# Patient Record
Sex: MALE | Race: BLACK OR AFRICAN AMERICAN | NOT HISPANIC OR LATINO | Employment: UNEMPLOYED | URBAN - METROPOLITAN AREA
[De-identification: names, ages, dates, MRNs, and addresses within clinical notes are randomized per-mention and may not be internally consistent; named-entity substitution may affect disease eponyms.]

---

## 2017-02-21 ENCOUNTER — TRANSFERRED RECORDS (OUTPATIENT)
Dept: HEALTH INFORMATION MANAGEMENT | Facility: CLINIC | Age: 52
End: 2017-02-21

## 2017-03-21 ENCOUNTER — TELEPHONE (OUTPATIENT)
Dept: GASTROENTEROLOGY | Facility: CLINIC | Age: 52
End: 2017-03-21

## 2017-03-21 NOTE — TELEPHONE ENCOUNTER
Sulfasalazine  Last Written Prescription Date:  8/2/16  Last Fill Quantity: 360,   # refills: 0  Last Office Visit : 6/29/15  (procedure 11/6/15)  Future Office visit:  No appts scheduled    Routing refill request to provider for review/approval because:  Routed to RN

## 2017-03-27 ENCOUNTER — TELEPHONE (OUTPATIENT)
Dept: GASTROENTEROLOGY | Facility: CLINIC | Age: 52
End: 2017-03-27

## 2017-03-27 NOTE — TELEPHONE ENCOUNTER
Sulfasalazine  Last Written Prescription Date: 8/2/16  Last Fill Quantity: 360, # refills: 0  Last Office Visit : 6/29/15 (procedure 11/6/15)  Future Office visit: No appts scheduled     Routing refill request to provider for review/approval because:  Routed to RN pt last appt 6/2015 with no further appts.

## 2017-03-31 ENCOUNTER — CARE COORDINATION (OUTPATIENT)
Dept: GASTROENTEROLOGY | Facility: CLINIC | Age: 52
End: 2017-03-31

## 2017-03-31 NOTE — PROGRESS NOTES
"Contacted patient to schedule clinic follow up as medication refill is needed.    Scheduled with Kory next Friday. Patient reports worsening abdominal pain and requests to \"bump up\" his medication dose. Denies blood in stool.    He was also adamant that I follow up with Dr. Grigsby regarding his Colonoscopy results. He has concerns re: location of polyps removed during procedure as a recently published study  indicates a higher likely marrero of developing colon CA.    Will route to MD    "

## 2017-05-02 ENCOUNTER — TRANSFERRED RECORDS (OUTPATIENT)
Dept: HEALTH INFORMATION MANAGEMENT | Facility: CLINIC | Age: 52
End: 2017-05-02

## 2017-06-29 ENCOUNTER — TELEPHONE (OUTPATIENT)
Dept: INTERNAL MEDICINE | Facility: CLINIC | Age: 52
End: 2017-06-29

## 2017-06-29 NOTE — TELEPHONE ENCOUNTER
----- Message from Mary Erickson sent at 6/29/2017  8:48 AM CDT -----  Regarding: Needs Urgent Appointment  Contact: [phone]  Parrish states that he has not had his colitis medication since March; and the Pain clinic he visits in [place] closed.  He has not had his percocet since June 1st.  Parrish is in a lot of pain and states that he is coming down to the clinic tomorrow regardless of having a scheduled appointment time.  Please give Parrish a call to discuss scheduling options for Friday: [phone]    June 29, 2017 11:37 AM  Spoke with patient. His daughter was having surgery, so he did not have much time to talk, but did again mention that he has been without his medication for colitis since March. Has problems with severe abdominal cramping, with abdominal pain being rated 7-9 of 10. He also has chronic pain issues. His pain clinic in [place] closed, and the clinic that they referred him to isn't accepting his insurance at this time. He has now been without his pain medication since the beginning of June. He wants to be seen at [facility] tomorrow anytime after 10:00 AM. He is aware that Dr. Dial is leaving the [facility], and is willing to see any provider. Appointment scheduled for patient to see Maria Isabel Perez NP at 3:00 tomorrow.   Sunshine Paz RN, Care Coordinator

## 2017-06-30 ENCOUNTER — OFFICE VISIT (OUTPATIENT)
Dept: INTERNAL MEDICINE | Facility: CLINIC | Age: 52
End: 2017-06-30

## 2017-06-30 VITALS — SYSTOLIC BLOOD PRESSURE: 151 MMHG | HEART RATE: 70 BPM | DIASTOLIC BLOOD PRESSURE: 101 MMHG | RESPIRATION RATE: 20 BRPM

## 2017-06-30 DIAGNOSIS — M25.562 CHRONIC PAIN OF LEFT KNEE: ICD-10-CM

## 2017-06-30 DIAGNOSIS — M54.5 CHRONIC LOW BACK PAIN, UNSPECIFIED BACK PAIN LATERALITY, WITH SCIATICA PRESENCE UNSPECIFIED: Primary | ICD-10-CM

## 2017-06-30 DIAGNOSIS — G89.29 CHRONIC NECK PAIN: ICD-10-CM

## 2017-06-30 DIAGNOSIS — G89.29 CHRONIC PAIN OF LEFT KNEE: ICD-10-CM

## 2017-06-30 DIAGNOSIS — M54.2 CHRONIC NECK PAIN: ICD-10-CM

## 2017-06-30 DIAGNOSIS — K51.00 ULCERATIVE PANCOLITIS WITHOUT COMPLICATION (H): ICD-10-CM

## 2017-06-30 DIAGNOSIS — F32.1 MAJOR DEPRESSIVE DISORDER, SINGLE EPISODE, MODERATE (H): ICD-10-CM

## 2017-06-30 DIAGNOSIS — G89.29 CHRONIC LOW BACK PAIN, UNSPECIFIED BACK PAIN LATERALITY, WITH SCIATICA PRESENCE UNSPECIFIED: Primary | ICD-10-CM

## 2017-06-30 DIAGNOSIS — I10 ESSENTIAL HYPERTENSION: ICD-10-CM

## 2017-06-30 LAB
ALBUMIN SERPL-MCNC: 4.2 G/DL (ref 3.4–5)
ALP SERPL-CCNC: 67 U/L (ref 40–150)
ALT SERPL W P-5'-P-CCNC: 40 U/L (ref 0–70)
ANION GAP SERPL CALCULATED.3IONS-SCNC: 6 MMOL/L (ref 3–14)
AST SERPL W P-5'-P-CCNC: 24 U/L (ref 0–45)
BILIRUB SERPL-MCNC: 0.7 MG/DL (ref 0.2–1.3)
BUN SERPL-MCNC: 13 MG/DL (ref 7–30)
CALCIUM SERPL-MCNC: 9 MG/DL (ref 8.5–10.1)
CHLORIDE SERPL-SCNC: 103 MMOL/L (ref 94–109)
CO2 SERPL-SCNC: 30 MMOL/L (ref 20–32)
CREAT SERPL-MCNC: 0.96 MG/DL (ref 0.66–1.25)
ERYTHROCYTE [DISTWIDTH] IN BLOOD BY AUTOMATED COUNT: 11.9 % (ref 10–15)
GFR SERPL CREATININE-BSD FRML MDRD: 82 ML/MIN/1.7M2
GLUCOSE SERPL-MCNC: 118 MG/DL (ref 70–99)
HCT VFR BLD AUTO: 42.5 % (ref 40–53)
HGB BLD-MCNC: 14.7 G/DL (ref 13.3–17.7)
MCH RBC QN AUTO: 30.3 PG (ref 26.5–33)
MCHC RBC AUTO-ENTMCNC: 34.6 G/DL (ref 31.5–36.5)
MCV RBC AUTO: 88 FL (ref 78–100)
PLATELET # BLD AUTO: 232 10E9/L (ref 150–450)
POTASSIUM SERPL-SCNC: 3.6 MMOL/L (ref 3.4–5.3)
PROT SERPL-MCNC: 7.4 G/DL (ref 6.8–8.8)
RBC # BLD AUTO: 4.85 10E12/L (ref 4.4–5.9)
SODIUM SERPL-SCNC: 138 MMOL/L (ref 133–144)
WBC # BLD AUTO: 5.3 10E9/L (ref 4–11)

## 2017-06-30 RX ORDER — ESCITALOPRAM OXALATE 20 MG/1
20 TABLET ORAL DAILY
Qty: 90 TABLET | Refills: 1 | Status: ON HOLD | OUTPATIENT
Start: 2017-06-30 | End: 2019-03-11

## 2017-06-30 RX ORDER — BACLOFEN 10 MG/1
10 TABLET ORAL 2 TIMES DAILY
Qty: 90 TABLET | Refills: 1 | Status: SHIPPED | OUTPATIENT
Start: 2017-06-30

## 2017-06-30 RX ORDER — SULFASALAZINE 500 MG/1
1000 TABLET ORAL 2 TIMES DAILY
Qty: 180 TABLET | Refills: 0 | Status: SHIPPED | OUTPATIENT
Start: 2017-06-30 | End: 2021-02-01 | Stop reason: ALTCHOICE

## 2017-06-30 RX ORDER — GABAPENTIN 300 MG/1
300 CAPSULE ORAL AT BEDTIME
Qty: 30 CAPSULE | Refills: 1 | Status: SHIPPED | OUTPATIENT
Start: 2017-06-30

## 2017-06-30 ASSESSMENT — ANXIETY QUESTIONNAIRES
1. FEELING NERVOUS, ANXIOUS, OR ON EDGE: MORE THAN HALF THE DAYS
IF YOU CHECKED OFF ANY PROBLEMS ON THIS QUESTIONNAIRE, HOW DIFFICULT HAVE THESE PROBLEMS MADE IT FOR YOU TO DO YOUR WORK, TAKE CARE OF THINGS AT HOME, OR GET ALONG WITH OTHER PEOPLE: VERY DIFFICULT
5. BEING SO RESTLESS THAT IT IS HARD TO SIT STILL: NOT AT ALL
6. BECOMING EASILY ANNOYED OR IRRITABLE: MORE THAN HALF THE DAYS
3. WORRYING TOO MUCH ABOUT DIFFERENT THINGS: SEVERAL DAYS
7. FEELING AFRAID AS IF SOMETHING AWFUL MIGHT HAPPEN: SEVERAL DAYS
GAD7 TOTAL SCORE: 10
2. NOT BEING ABLE TO STOP OR CONTROL WORRYING: MORE THAN HALF THE DAYS

## 2017-06-30 ASSESSMENT — PAIN SCALES - GENERAL: PAINLEVEL: EXTREME PAIN (8)

## 2017-06-30 ASSESSMENT — PATIENT HEALTH QUESTIONNAIRE - PHQ9: 5. POOR APPETITE OR OVEREATING: MORE THAN HALF THE DAYS
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
## 2017-06-30 NOTE — PATIENT INSTRUCTIONS
[facility] Phone Number [phone]  [facility] Medication Refill Request Information:  * Please contact your pharmacy regarding ANY request for medication refills.  ** [facility] Prescription Fax = [phone]  * Please allow 3 business days for routine medication refills.  * Please allow 5 business days for controlled substance medication refills.     [facility] Test Result notification information:  *You will be notified with in 7-10 days of your appointment day regarding the results of your test.  If you are on MyChart you will be notified as soon as the provider has reviewed the results and signed off on them.

## 2017-07-01 ASSESSMENT — ANXIETY QUESTIONNAIRES: GAD7 TOTAL SCORE: 10

## 2017-07-01 ASSESSMENT — PATIENT HEALTH QUESTIONNAIRE - PHQ9: SUM OF ALL RESPONSES TO PHQ QUESTIONS 1-9: 11

## 2017-07-14 ENCOUNTER — OFFICE VISIT (OUTPATIENT)
Dept: INTERNAL MEDICINE | Facility: CLINIC | Age: 52
End: 2017-07-14

## 2017-07-14 VITALS
SYSTOLIC BLOOD PRESSURE: 130 MMHG | DIASTOLIC BLOOD PRESSURE: 78 MMHG | BODY MASS INDEX: 31.49 KG/M2 | HEART RATE: 70 BPM | WEIGHT: 209 LBS

## 2017-07-14 DIAGNOSIS — F11.90 CHRONIC NARCOTIC USE: ICD-10-CM

## 2017-07-14 DIAGNOSIS — M54.5 CHRONIC LOW BACK PAIN, UNSPECIFIED BACK PAIN LATERALITY, WITH SCIATICA PRESENCE UNSPECIFIED: Primary | ICD-10-CM

## 2017-07-14 DIAGNOSIS — M54.2 CHRONIC NECK PAIN: ICD-10-CM

## 2017-07-14 DIAGNOSIS — G89.29 CHRONIC LOW BACK PAIN, UNSPECIFIED BACK PAIN LATERALITY, WITH SCIATICA PRESENCE UNSPECIFIED: Primary | ICD-10-CM

## 2017-07-14 DIAGNOSIS — G89.29 CHRONIC NECK PAIN: ICD-10-CM

## 2017-07-14 DIAGNOSIS — Z02.89 PAIN MANAGEMENT CONTRACT SIGNED: ICD-10-CM

## 2017-07-14 PROCEDURE — 40000358 ZZHCL STATISTIC DRUG SCREEN MULTIPLE (METRO): Performed by: INTERNAL MEDICINE

## 2017-07-14 PROCEDURE — 80307 DRUG TEST PRSMV CHEM ANLYZR: CPT | Performed by: INTERNAL MEDICINE

## 2017-07-14 RX ORDER — OXYCODONE AND ACETAMINOPHEN 5; 325 MG/1; MG/1
1 TABLET ORAL EVERY 6 HOURS PRN
Qty: 120 TABLET | Refills: 0 | Status: SHIPPED | OUTPATIENT
Start: 2017-07-14 | End: 2017-08-14

## 2017-07-14 ASSESSMENT — PAIN SCALES - GENERAL: PAINLEVEL: EXTREME PAIN (9)
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
## 2017-07-14 NOTE — LETTER
[facility] ([facility]) Contract: Opioid Prescription  I understand that my provider, ____________________________, is prescribing an opioid medication to assist me in managing my chronic pain and assist me in improving my daily function and activity level. If my activity level or general function changes, the medication may be changed or discontinued. I understand that my provider is not required to prescribe this medication. The risks, side effects, and benefits of taking this medication have been explained to me and I agree to the following conditions related to this treatment. Failure to adhere to these conditions will result in discontinuation of this medication and possible termination of care from the [facility].     I will take my medication as prescribed. I will not change the amount or frequency of the medication without provider approval.    I will only receive these medications from _____________________________________________ (or my provider s designee as determined by my provider).    I am being prescribed opioid medications to treat the following condition(s):________________________________________________.    If I am hospitalized or seen in an emergency department or urgent care for a condition that results in a prescription for another controlled substance (such as anti-anxiety, additional pain medication, sleep aid, or stimulants), I will inform the clinic within one business day of the additional medication.    I will notify the clinic within one business day if I seek any care elsewhere related to this medication.    I will participate in all additional treatments that my provider recommends, such as physical therapy or consultations with a pain or mental health specialist.     I will notify my provider of any history of addiction or current chemical dependence.    I will not use illegal drugs (includes marijuana).     I will comply with random drug screening to confirm  proper use of my medication.    I will comply with state law. I understand that I may not be able to operate a motor vehicle while taking these medications. I will not participate in any activities that will put myself or others at physical risk while taking any medications.    I will not give, sell, or trade my medications to anyone else.      I will not take someone else s medications.    I will not forge or change my prescriptions in any way.    I understand I will have one clinic appointment every __________months (or more frequently) as determined by my provider.    I understand it is my responsibility to schedule future appointments with my provider at the end of each visit.    I will not request early refills. I understand that lost and/or stolen prescriptions/medications will not be replaced.    I will request refills of these medications in the following manner:    o I am responsible to keep track of my medications and plan ahead to arrange for refills. It is my responsibility to ensure that I do not run out of medication.  o I will call [phone] and request a refill of my opioid medication(s).  All other medication refill request should be requested through your pharmacy.  o I will give the clinic one full week notice prior to needing a refill.  o I will not walk in or call into the clinic and request this medication to be refilled same day (I understand I must give a full week notice of the need for refill).  o I understand that these prescriptions will be dispensed monthly with a maximum of one-month supply with no refills.  o Prescriptions will only be available to be picked up during regular office hours (7:30am-5:00pm Monday-Friday). Refills will not be provided at night, on weekends, or during holidays.  I will treat my provider and clinic staff with respect. I will not yell, name-call, shout, or use offensive or vulgar language. I will not threaten or act in an intimidating manner on the  telephone or while in the clinic toward my provider or clinic staff.  I agree if I break this agreement, my physician reserves the right to stop prescribing the controlled substance for me - my medications will be discontinued in a medically safe fashion, and I will not be allowed to get pain medications from any other provider in this clinic and/or it may result in a termination of care from this clinic.       I understand this contract is in effect for all current and future opioid prescriptions received through the [facility] Care [facility].          _________________________________________________________________________                _________________  Signature of Patient or Personal Representative (state relationship)                                        Date    _________________________________________________________________________                 ________________                                              Signature of Provider (also please print name)                                                                               Date

## 2017-07-14 NOTE — MR AVS SNAPSHOT
After Visit Summary   7/14/2017    Parrish Muir    MRN: [id number]           Patient Information     Date Of Birth          1965        Visit Information        Provider Department      7/14/2017 2:30 PM Barbara Nye MD [facility]        Today's Diagnoses     Chronic low back pain, unspecified back pain laterality, with sciatica presence unspecified    -  1    Chronic neck pain        Chronic narcotic use        Pain management contract signed          Care Instructions    Primary Care Center Medication Refill Request Information:  * Please contact your pharmacy regarding ANY request for medication refills.  ** [facility] Prescription Fax = [phone]  * Please allow 3 business days for routine medication refills.  * Please allow 5 business days for controlled substance medication refills.     Primary Care Center Test Result notification information:  *You will be notified within 7-10 days of your appointment day regarding the results of your test.  If you are on MyChart you will be notified as soon as the provider has reviewed the results and signed off on them.            Follow-ups after your visit        Follow-up notes from your care team     Return in about 2 months (around 9/14/2017).      Your next 10 appointments already scheduled     Aug 23, 2017  8:00 AM CDT   (Arrive by 7:45 AM)   Return Visit with Kory Reese PA-C   [facility] ([facility])    [address] [place] [id number]   [phone]            Sep 13, 2017 11:25 AM CDT   (Arrive by 11:10 AM)   Return Visit with Barbara Nye MD   [facility] ([facility])    [address] [place] [id number]   [phone]            Oct 31, 2017 12:30 PM CDT   (Arrive by 12:15 PM)   New Patient Visit with Damien Fay MD   [facility] ([facility])    [address]  [address] [facility] [id number] [phone]              Who to contact     Please call your clinic at [phone] to:    Ask questions about your health    Make or cancel appointments    Discuss your medicines    Learn about your test results    Speak to your doctor   If you have compliments or concerns about an experience at your clinic, or if you wish to file a complaint, please contact [facility] Patient Relations at [phone] or email us at [email or web address]         Additional Information About Your Visit        [employer] Information     [employer] gives you secure access to your electronic health record. If you see a primary care provider, you can also send messages to your care team and make appointments. If you have questions, please call your primary care clinic.  If you do not have a primary care provider, please call [phone] and they will assist you.      [employer] is an electronic gateway that provides easy, online access to your medical records. With [employer], you can request a clinic appointment, read your test results, renew a prescription or communicate with your care team.     To access your existing account, please contact your [facility] or call [phone] for assistance.        Care EveryWhere ID     This is your Care EveryWhere ID. This could be used by other organizations to access your [place] medical records  [id number]        Your Vitals Were     Pulse BMI (Body Mass Index)                70 31.49 kg/m2           Blood Pressure from Last 3 Encounters:   07/14/17 130/78   06/30/17 (!) 151/101   01/29/16 (!) 135/94    Weight from Last 3 Encounters:   07/14/17 94.8 kg (209 lb)   01/29/16 96.1 kg (211 lb 12.8 oz)   11/06/15 95 kg (209 lb 7 oz)                 Today's Medication Changes          These changes are accurate as of: 7/14/17  11:59 PM.  If you have any questions, ask your nurse or doctor.               These medicines have changed or have updated prescriptions.        Dose/Directions    * oxyCODONE-acetaminophen 5-325 MG per tablet   Commonly known as:  PERCOCET   This may have changed:  You were already taking a medication with the same name, and this prescription was added. Make sure you understand how and when to take each.   Used for:  Chronic low back pain, unspecified back pain laterality, with sciatica presence unspecified, Chronic neck pain, Chronic narcotic use   Changed by:  Barbara Nye MD        Dose:  1 tablet   Take 1 tablet by mouth every 6 hours as needed for moderate to severe pain   Quantity:  120 tablet   Refills:  0       * oxyCODONE-acetaminophen 5-325 MG per tablet   Commonly known as:  PERCOCET   This may have changed:  Another medication with the same name was added. Make sure you understand how and when to take each.   Changed by:  Ivan Wallace MD        Dose:  1 tablet   Take 1 tablet by mouth every 4 hours as needed.   Refills:  0       * Notice:  This list has 2 medication(s) that are the same as other medications prescribed for you. Read the directions carefully, and ask your doctor or other care provider to review them with you.         Where to get your medicines      Some of these will need a paper prescription and others can be bought over the counter.  Ask your nurse if you have questions.     Bring a paper prescription for each of these medications     oxyCODONE-acetaminophen 5-325 MG per tablet                Primary Care Provider Office Phone # Fax #    Farida Dial [phone]       PRIMARY CARE CENTER [address] [place] [address]        Equal Access to Services     [facility]ET[facility] : Bennett ruggiero Soradha, wawallace luqdarius, qanancita kategan bentley . [facility] [phone].    ATENCIÓN: Mahesh abernathy  "español, tiene a boyd disposición servicios gratuitos de asistencia lingüística. Herbert lay [phone].    We comply with applicable federal civil rights laws and [place] laws. We do not discriminate on the basis of race, color, national origin, age, disability sex, sexual orientation or gender identity.            Thank you!     Thank you for choosing [facility]  for your care. Our goal is always to provide you with excellent care. Hearing back from our patients is one way we can continue to improve our services. Please take a few minutes to complete the written survey that you may receive in the mail after your visit with us. Thank you!             Your Updated Medication List - Protect others around you: Learn how to safely use, store and throw away your medicines at www.disposemymeds.org.          This list is accurate as of: 7/14/17 11:59 PM.  Always use your most recent med list.                   Brand Name Dispense Instructions for use Diagnosis    baclofen 10 MG tablet    LIORESAL    90 tablet    Take 1 tablet (10 mg) by mouth 2 times daily    Chronic low back pain, unspecified back pain laterality, with sciatica presence unspecified       escitalopram 20 MG tablet    LEXAPRO    90 tablet    Take 1 tablet (20 mg) by mouth daily    Major depressive disorder, single episode, moderate (H)       FOLIC ACID PO      Take 400 mcg by mouth daily        gabapentin 300 MG capsule    NEURONTIN    30 capsule    Take 1 capsule (300 mg) by mouth At Bedtime    Chronic low back pain, unspecified back pain laterality, with sciatica presence unspecified       magnesium citrate solution     296 mL    Take 296 mLs by mouth See Admin Instructions Refer to \"Getting Ready for a Colonoscopy\" patient handout.    Inflammatory bowel disease (ulcerative colitis) (H)       * oxyCODONE-acetaminophen 5-325 MG per tablet    PERCOCET    120 tablet    Take 1 tablet by mouth every 6 hours as needed for moderate to severe " "pain    Chronic low back pain, unspecified back pain laterality, with sciatica presence unspecified, Chronic neck pain, Chronic narcotic use       * oxyCODONE-acetaminophen 5-325 MG per tablet    PERCOCET     Take 1 tablet by mouth every 4 hours as needed.        sulfaSALAzine 500 MG tablet    AZULFIDINE    180 tablet    Take 2 tablets (1,000 mg) by mouth 2 times daily    Ulcerative pancolitis without complication (H)       TYLENOL PO      Take 1 tablet by mouth every 4 hours as needed for mild pain or fever '\" I take 2500 to 3500 mg of tylenol daily.\"        * Notice:  This list has 2 medication(s) that are the same as other medications prescribed for you. Read the directions carefully, and ask your doctor or other care provider to review them with you.      "

## 2017-07-14 NOTE — NURSING NOTE
Chief Complaint   Patient presents with     Pain     Pt states he is here for chronic pain.      Kiara Peralta LPN July 14, 2017 2:11 PM

## 2017-07-14 NOTE — PATIENT INSTRUCTIONS
Primary Care Center Medication Refill Request Information:  * Please contact your pharmacy regarding ANY request for medication refills.  ** [facility] Prescription Fax = [phone]  * Please allow 3 business days for routine medication refills.  * Please allow 5 business days for controlled substance medication refills.     Primary Care Center Test Result notification information:  *You will be notified within 7-10 days of your appointment day regarding the results of your test.  If you are on MyChart you will be notified as soon as the provider has reviewed the results and signed off on them.

## 2017-07-17 LAB
ACETAMINOPHEN QUAL: NEGATIVE
AMANTADINE: NEGATIVE
AMITRIPTYLINE QUAL: NEGATIVE
AMOXAPINE: NEGATIVE
AMPHETAMINES QUAL: NEGATIVE
ATROPINE: NEGATIVE
BENZODIAZ UR QL: ABNORMAL
CAFFEINE QUAL: POSITIVE
CANNABINOIDS UR QL SCN: ABNORMAL
CARBAMAZEPINE QUAL: NEGATIVE
CHLORPHENIRAMINE: NEGATIVE
CHLORPROMAZINE: NEGATIVE
CITALOPRAM QUAL: POSITIVE
CLOMIPRAMINE QUAL: NEGATIVE
COCAINE QUAL: NEGATIVE
COCAINE UR QL: ABNORMAL
CODEINE QUAL: NEGATIVE
DESIPRAMINE QUAL: NEGATIVE
DEXTROMETHORPHAN: NEGATIVE
DIPHENHYDRAMINE: NEGATIVE
DOXEPIN/METABOLITE: NEGATIVE
DOXYLAMINE: NEGATIVE
EPHEDRINE OR PSEUDO: NEGATIVE
FENTANYL QUAL: NEGATIVE
FLUOXETINE AND METAB: NEGATIVE
HYDROCODONE QUAL: NEGATIVE
HYDROMORPHONE QUAL: NEGATIVE
IBUPROFEN QUAL: NEGATIVE
IMIPRAMINE QUAL: NEGATIVE
LAMOTRIGINE QUAL: NEGATIVE
LOXAPINE: NEGATIVE
MAPROTYLINE: NEGATIVE
MDMA QUAL: NEGATIVE
MEPERIDINE QUAL: NEGATIVE
METHAMPHETAMINE: NEGATIVE
METHODONE QUAL: NEGATIVE
MORPHINE QUAL: NEGATIVE
NICOTINE: NEGATIVE
NORTRIPTYLINE QUAL: NEGATIVE
OLANZAPINE QUAL: NEGATIVE
OPIATES UR QL SCN: ABNORMAL
OXYCODONE QUAL: NEGATIVE
PENTAZOCINE: NEGATIVE
PHENCYCLIDINE QUAL: NEGATIVE
PHENMETRAZINE: NEGATIVE
PHENTERMINE: NEGATIVE
PHENYLBUTAZONE: NEGATIVE
PHENYLPROPANOLAMINE: NEGATIVE
PROPOXPHENE QUAL: NEGATIVE
PROPRANOLOL QUAL: NEGATIVE
PYRILAMINE: NEGATIVE
SALICYLATE QUAL: NEGATIVE
THEOBROMINE: POSITIVE
TOPIRAMATE QUAL: NEGATIVE
TRIMIPRAMINE QUAL: NEGATIVE
VENLAFAXINE QUAL: ABNORMAL

## 2017-08-14 DIAGNOSIS — M54.2 CHRONIC NECK PAIN: ICD-10-CM

## 2017-08-14 DIAGNOSIS — G89.29 CHRONIC LOW BACK PAIN, UNSPECIFIED BACK PAIN LATERALITY, WITH SCIATICA PRESENCE UNSPECIFIED: ICD-10-CM

## 2017-08-14 DIAGNOSIS — G89.29 CHRONIC NECK PAIN: ICD-10-CM

## 2017-08-14 DIAGNOSIS — F11.90 CHRONIC NARCOTIC USE: ICD-10-CM

## 2017-08-14 DIAGNOSIS — M54.5 CHRONIC LOW BACK PAIN, UNSPECIFIED BACK PAIN LATERALITY, WITH SCIATICA PRESENCE UNSPECIFIED: ICD-10-CM

## 2017-08-14 RX ORDER — OXYCODONE AND ACETAMINOPHEN 5; 325 MG/1; MG/1
1 TABLET ORAL EVERY 6 HOURS PRN
Qty: 120 TABLET | Refills: 0 | Status: SHIPPED | OUTPATIENT
Start: 2017-08-14 | End: 2017-09-13

## 2017-08-14 NOTE — TELEPHONE ENCOUNTER
Date last refill was ordered:7/14/17  Quantity ordered:120  Number of refills:0  Date of last clinic visit:7.14/17  Date of next clinic visit:9.13.17    Processing: Contact patient to confirm     8/14/17 RX mailed to patients home address per patient request.  Raquel Palacios RN

## 2017-08-23 ENCOUNTER — OFFICE VISIT (OUTPATIENT)
Dept: GASTROENTEROLOGY | Facility: CLINIC | Age: 52
End: 2017-08-23

## 2017-08-23 VITALS
OXYGEN SATURATION: 98 % | SYSTOLIC BLOOD PRESSURE: 162 MMHG | WEIGHT: 205 LBS | HEART RATE: 67 BPM | BODY MASS INDEX: 30.36 KG/M2 | DIASTOLIC BLOOD PRESSURE: 108 MMHG | HEIGHT: 69 IN

## 2017-08-23 DIAGNOSIS — Z79.52 LONG TERM CURRENT USE OF SYSTEMIC STEROIDS: Primary | ICD-10-CM

## 2017-08-23 DIAGNOSIS — K51.00 ULCERATIVE PANCOLITIS WITHOUT COMPLICATION (H): ICD-10-CM

## 2017-08-23 RX ORDER — MESALAMINE 1.2 G/1
2400 TABLET, DELAYED RELEASE ORAL EVERY MORNING
Qty: 180 TABLET | Refills: 1 | Status: SHIPPED | OUTPATIENT
Start: 2017-08-23 | End: 2018-02-26

## 2017-08-23 ASSESSMENT — PAIN SCALES - GENERAL: PAINLEVEL: NO PAIN (0)

## 2017-08-23 NOTE — PROGRESS NOTES
IBD CLINIC VISIT     CC/REFERRING MD:  Miguel Morales  REASON FOR FOLLOW UP: Ulcerative Colitis  COLLABORATING PHYSICIAN: Francisco Grigsby MD    IBD HISTORY  Age at diagnosis: 2004  Extent of disease: pan-colitis  Current UC medications: SSA 1000mg PO BID  Prior UC surgeries: None  Prior IBD Medications:   Asacol (when first diagnosed and did not work)  Prednisone  Lialda (worked well)    DRUG MONITORING  TPMT enzyme activity: n/a    6-TGN/6-MMPN levels: n/a    Biologic concentration:n/a    DISEASE ASSESSMENT  Labs:  Lab Results   Component Value Date    ALBUMIN 4.2 06/30/2017     Recent Labs   Lab Test  03/27/15   1059   SED  5     Endoscopic assessment: Normal mucosa throughout colon.  2mm sessile sigmoid polyp resected.   Enterography: --  Fecal calprotectin: --   C diff: --    ASSESSMENT/PLAN  Parrish is a 52-year-old male with ulcerative colitis, in clinical and symptomatic remission on sulfasalazine monotherapy.      1.  Ulcerative colitis, pancolitis.  The patient is interested in changing to Lialda from sulfasalazine.  He has tried Lialda in the past with good success. I have agreed to switch patient to Lialda.  Of note, the patient had been on Asacol in the past without resolution of symptoms, but feels that Lialda has worked well.  The patient will be due for colon cancer surveillance in fall of 2018.  Laboratory studies while on sulfasalazine include a normal creatinine obtained in 06/2017.     2. 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  Dysplasia screening is recommended 2018.    Cervical cancer screening: N/A    Skin cancer screening: Since patient is not immunocompromised, this is per patient's dermatologist recommendations.    Depression Screening:  -- Over the last month, have you felt down, depressed, or hopeless? Yes  -- Over the last month, have you felt little interest or pleasure doing things? Yes    Misc:  -- Avoid tobacco use  -- Avoid NSAIDs as there is potentially a 25% chance of causing an IBD flare    RTC 6 months    Thank you for this consultation.  It was a pleasure to participate in the care of this patient; please contact us with any further questions.      Kory Reese PA-C  Division of Gastroenterology, Hepatology and Nutrition  [facility]    HPI:   A 52-year-old male with history of ulcerative colitis since 2003, currently on sulfasalazine monotherapy with other past medical history to include hypertension, depression.  The patient's current bowel pattern is 1-2 bowel movements per day; however, he does have occasions of extreme abdominal cramping which he describes as a banana shaped pattern on his lower abdomen.  Denies any hematochezia, melena, or nighttime stools.  He does have occasional nausea when the abdominal cramping comes on.  He did have a period of time in the spring where he was off sulfasalazine as he was out of refills; however, he has restarted his sulfasalazine over the last month.  He has been on Asacol in the past which  did not control symptoms.  He was then started on sulfasalazine, which has controlled his disease for the most part, however, he does have intermittent abdominal cramping.  He has tried Lialda in the past and feels that this worked better in controlling symptoms.  Last endoscopic evaluation was in 2015 that showed complete mucosal healing. Patient denies BRBPR, melena, urgency, hesitancy passing flatus, skin changes, vision changes and nightime stools    ROS:    No fevers or chills  No weight loss  No blurry vision, double vision or change in vision  No sore throat  No lymphadenopathy  + headache  No paraesthesias, or weakness in a limb  No shortness of breath or wheezing  No chest pain or pressure  + arthralgias or myalgias  No rashes or skin changes  No odynophagia or dysphagia  No BRBPR, hematochezia, melena  No dysuria, frequency or urgency  No hot/cold intolerance or polyria  + anxiety or depression    Extra intestinal manifestations of IBD:  No uveitis/episcleritis  No aphthous ulcers   No arthritis   No erythema nodosum/pyoderma gangrenosum.     PERTINENT PAST MEDICAL HISTORY:  As noted above    PREVIOUS SURGERIES:  Past Surgical History:   Procedure Laterality Date     COLONOSCOPY WITH CO2 INSUFFLATION N/A 11/6/2015    Procedure: COLONOSCOPY WITH CO2 INSUFFLATION;  Surgeon: Francisco Grigsby MD;  Location:  OR     ALLERGIES:     Allergies   Allergen Reactions     Morphine Sulfate        PERTINENT MEDICATIONS:    Current Outpatient Prescriptions:      oxyCODONE-acetaminophen (PERCOCET) 5-325 MG per tablet, Take 1 tablet by mouth every 6 hours as needed for moderate to severe pain, Disp: 120 tablet, Rfl: 0     escitalopram (LEXAPRO) 20 MG tablet, Take 1 tablet (20 mg) by mouth daily, Disp: 90 tablet, Rfl: 1     baclofen (LIORESAL) 10 MG tablet, Take 1 tablet (10 mg) by mouth 2 times daily, Disp: 90 tablet, Rfl: 1     gabapentin (NEURONTIN) 300 MG capsule, Take 1 capsule (300 mg) by mouth At Bedtime, Disp: 30  "capsule, Rfl: 1     sulfaSALAzine (AZULFIDINE) 500 MG tablet, Take 2 tablets (1,000 mg) by mouth 2 times daily, Disp: 180 tablet, Rfl: 0     FOLIC ACID PO, Take 400 mcg by mouth daily, Disp: , Rfl:      Acetaminophen (TYLENOL PO), Take 1 tablet by mouth every 4 hours as needed for mild pain or fever '\" I take 2500 to 3500 mg of tylenol daily.\", Disp: , Rfl:      magnesium citrate solution, Take 296 mLs by mouth See Admin Instructions Refer to \"Getting Ready for a Colonoscopy\" patient handout., Disp: 296 mL, Rfl: 0     oxycodone-acetaminophen (PERCOCET) 5-325 MG per tablet, Take 1 tablet by mouth every 4 hours as needed., Disp: , Rfl:     SOCIAL HISTORY:  Social History     Social History     Marital status:      Spouse name: N/A     Number of children: N/A     Years of education: N/A     Occupational History     Not on file.     Social History Main Topics     Smoking status: Never Smoker     Smokeless tobacco: Never Used     Alcohol use No     Drug use: No     Sexual activity: Not on file     Other Topics Concern     Not on file     Social History Narrative       FAMILY HISTORY:  Family History   Problem Relation Age of Onset     Alzheimer Disease Mother        Past/family/social history reviewed and no changes    PHYSICAL EXAMINATION:  Constitutional: aaox3, cooperative, pleasant, not dyspneic/diaphoretic, no acute distress  Vitals reviewed: BP (!) 162/108  Pulse 67  Ht 1.753 m (5' 9\")  Wt 93 kg (205 lb)  SpO2 98%  BMI 30.27 kg/m2  Wt:   Wt Readings from Last 2 Encounters:   07/14/17 94.8 kg (209 lb)   01/29/16 96.1 kg (211 lb 12.8 oz)      Eyes: Sclera anicteric/injected  Ears/nose/mouth/throat: Normal oropharynx without ulcers or exudate, mucus membranes moist, hearing intact  Neck: supple, thyroid normal size  CV: No edema  Respiratory: Unlabored breathing  Lymph: No axillary, submandibular, supraclavicular or inguinal lymphadenopathy  Abd: Nondistended, +bs, no hepatosplenomegaly, nontender, no " peritoneal signs  Skin: warm, perfused, no jaundice  Psych: Normal affect  MSK: Normal gait      PERTINENT STUDIES:  Most recent CBC:  Recent Labs   Lab Test  06/30/17   1612  03/27/15   1059   WBC  5.3  6.0   HGB  14.7  14.5   HCT  42.5  42.1   PLT  232  226     Most recent hepatic panel:  Recent Labs   Lab Test  06/30/17   1612  03/27/15   1059   ALT  40  48   AST  24  28     Most recent creatinine:  Recent Labs   Lab Test  06/30/17   1612  03/27/15   1059   CR  0.96  1.01

## 2017-08-23 NOTE — LETTER
8/23/2017       RE: Parrish Muir  [address] [address]     Dear Colleague,    Thank you for referring your patient, Parrish Muir, to the [facility]. Please see a copy of my visit note below.    IBD CLINIC VISIT     CC/REFERRING MD:  Miguel Morales  REASON FOR FOLLOW UP: Ulcerative Colitis  COLLABORATING PHYSICIAN: Francisco Grigsby MD    IBD HISTORY  Age at diagnosis: 2004  Extent of disease: pan-colitis  Current UC medications: SSA 1000mg PO BID  Prior UC surgeries: None  Prior IBD Medications:   Asacol (when first diagnosed and did not work)  Prednisone  Lialda (worked well)    DRUG MONITORING  TPMT enzyme activity: n/a    6-TGN/6-MMPN levels: n/a    Biologic concentration:n/a    DISEASE ASSESSMENT  Labs:  Lab Results   Component Value Date    ALBUMIN 4.2 06/30/2017     Recent Labs   Lab Test  03/27/15   1059   SED  5     Endoscopic assessment: Normal mucosa throughout colon.  2mm sessile sigmoid polyp resected.   Enterography: --  Fecal calprotectin: --   C diff: --    ASSESSMENT/PLAN  Parrish is a 52-year-old male with ulcerative colitis, in clinical and symptomatic remission on sulfasalazine monotherapy.      1.  Ulcerative colitis, pancolitis.  The patient is interested in changing to Lialda from sulfasalazine.  He has tried Lialda in the past with good success. I have agreed to switch patient to Lialda.  Of note, the patient had been on Asacol in the past without resolution of symptoms, but feels that Lialda has worked well.  The patient will be due for colon cancer surveillance in fall of 2018.  Laboratory studies while on sulfasalazine include a normal creatinine obtained in 06/2017.     2. 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  Dysplasia screening is recommended 2018.    Cervical cancer screening: N/A    Skin cancer screening: Since patient is not immunocompromised, this is per patient's dermatologist recommendations.    Depression Screening:  -- Over the last month, have you felt down, depressed, or hopeless? Yes  -- Over the last month, have you felt little interest or pleasure doing things? Yes    Misc:  -- Avoid tobacco use  -- Avoid NSAIDs as there is potentially a 25% chance of causing an IBD flare    RTC 6 months    Thank you for this consultation.  It was a pleasure to participate in the care of this patient; please contact us with any further questions.      Kory Reese PA-C  Division of Gastroenterology, Hepatology and Nutrition  [facility]    HPI:   A 52-year-old male with history of ulcerative colitis since 2003, currently on sulfasalazine monotherapy with other past medical history to include hypertension, depression.  The patient's current bowel pattern is 1-2 bowel movements per day; however, he does have occasions of extreme abdominal cramping which he describes as a banana shaped pattern on his lower abdomen.  Denies any  hematochezia, melena, or nighttime stools.  He does have occasional nausea when the abdominal cramping comes on.  He did have a period of time in the spring where he was off sulfasalazine as he was out of refills; however, he has restarted his sulfasalazine over the last month.  He has been on Asacol in the past which did not control symptoms.  He was then started on sulfasalazine, which has controlled his disease for the most part, however, he does have intermittent abdominal cramping.  He has tried Lialda in the past and feels that this worked better in controlling symptoms.  Last endoscopic evaluation was in 2015 that showed complete mucosal healing. Patient denies BRBPR, melena, urgency, hesitancy passing flatus, skin changes, vision changes and nightime stools    ROS:    No fevers or chills  No weight loss  No blurry vision, double vision or change in vision  No sore throat  No lymphadenopathy  + headache  No paraesthesias, or weakness in a limb  No shortness of breath or wheezing  No chest pain or pressure  + arthralgias or myalgias  No rashes or skin changes  No odynophagia or dysphagia  No BRBPR, hematochezia, melena  No dysuria, frequency or urgency  No hot/cold intolerance or polyria  + anxiety or depression    Extra intestinal manifestations of IBD:  No uveitis/episcleritis  No aphthous ulcers   No arthritis   No erythema nodosum/pyoderma gangrenosum.     PERTINENT PAST MEDICAL HISTORY:  As noted above    PREVIOUS SURGERIES:  Past Surgical History:   Procedure Laterality Date     COLONOSCOPY WITH CO2 INSUFFLATION N/A 11/6/2015    Procedure: COLONOSCOPY WITH CO2 INSUFFLATION;  Surgeon: Francisco Grigsby MD;  Location:  OR     ALLERGIES:  Allergies   Allergen Reactions     Morphine Sulfate        PERTINENT MEDICATIONS:  Current Outpatient Prescriptions:      oxyCODONE-acetaminophen (PERCOCET) 5-325 MG per tablet, Take 1 tablet by mouth every 6 hours as needed for moderate to severe pain, Disp: 120 tablet,  "Rfl: 0     escitalopram (LEXAPRO) 20 MG tablet, Take 1 tablet (20 mg) by mouth daily, Disp: 90 tablet, Rfl: 1     baclofen (LIORESAL) 10 MG tablet, Take 1 tablet (10 mg) by mouth 2 times daily, Disp: 90 tablet, Rfl: 1     gabapentin (NEURONTIN) 300 MG capsule, Take 1 capsule (300 mg) by mouth At Bedtime, Disp: 30 capsule, Rfl: 1     sulfaSALAzine (AZULFIDINE) 500 MG tablet, Take 2 tablets (1,000 mg) by mouth 2 times daily, Disp: 180 tablet, Rfl: 0     FOLIC ACID PO, Take 400 mcg by mouth daily, Disp: , Rfl:      Acetaminophen (TYLENOL PO), Take 1 tablet by mouth every 4 hours as needed for mild pain or fever '\" I take 2500 to 3500 mg of tylenol daily.\", Disp: , Rfl:      magnesium citrate solution, Take 296 mLs by mouth See Admin Instructions Refer to \"Getting Ready for a Colonoscopy\" patient handout., Disp: 296 mL, Rfl: 0     oxycodone-acetaminophen (PERCOCET) 5-325 MG per tablet, Take 1 tablet by mouth every 4 hours as needed., Disp: , Rfl:     SOCIAL HISTORY:  Social History     Social History     Marital status:      Spouse name: N/A     Number of children: N/A     Years of education: N/A     Occupational History     Not on file.     Social History Main Topics     Smoking status: Never Smoker     Smokeless tobacco: Never Used     Alcohol use No     Drug use: No     Sexual activity: Not on file     Other Topics Concern     Not on file     Social History Narrative       FAMILY HISTORY:  Family History   Problem Relation Age of Onset     Alzheimer Disease Mother        Past/family/social history reviewed and no changes    PHYSICAL EXAMINATION:  Constitutional: aaox3, cooperative, pleasant, not dyspneic/diaphoretic, no acute distress  Vitals reviewed: BP (!) 162/108  Pulse 67  Ht 1.753 m (5' 9\")  Wt 93 kg (205 lb)  SpO2 98%  BMI 30.27 kg/m2  Wt:   Wt Readings from Last 2 Encounters:   07/14/17 94.8 kg (209 lb)   01/29/16 96.1 kg (211 lb 12.8 oz)      Eyes: Sclera " anicteric/injected  Ears/nose/mouth/throat: Normal oropharynx without ulcers or exudate, mucus membranes moist, hearing intact  Neck: supple, thyroid normal size  CV: No edema  Respiratory: Unlabored breathing  Lymph: No axillary, submandibular, supraclavicular or inguinal lymphadenopathy  Abd: Nondistended, +bs, no hepatosplenomegaly, nontender, no peritoneal signs  Skin: warm, perfused, no jaundice  Psych: Normal affect  MSK: Normal gait      PERTINENT STUDIES:  Most recent CBC:  Recent Labs   Lab Test  06/30/17   1612  03/27/15   1059   WBC  5.3  6.0   HGB  14.7  14.5   HCT  42.5  42.1   PLT  232  226     Most recent hepatic panel:  Recent Labs   Lab Test  06/30/17   1612  03/27/15   1059   ALT  40  48   AST  24  28     Most recent creatinine:  Recent Labs   Lab Test  06/30/17   1612  03/27/15   1059   CR  0.96  1.01       Again, thank you for allowing me to participate in the care of your patient.      Sincerely,    Kory Reese PA-C

## 2017-08-23 NOTE — MR AVS SNAPSHOT
After Visit Summary   8/23/2017    Parrish Muir    MRN: [id number]           Patient Information     Date Of Birth          1965        Visit Information        Provider Department      8/23/2017 8:00 AM Kory Reese PA-C [facility] Gastroenterology and IBD        Today's Diagnoses     Long term current use of systemic steroids    -  1    Ulcerative pancolitis without complication (H)          Care Instructions    It was a pleasure taking care of you today.  I've included a brief summary of our discussion and care plan from today's visit below.  Please review this information with your primary care provider.  ______________________________________________________________________    My recommendations are summarized as follows:    -- Start Lialda 2.4 g per day (2 tablets daily).  Continue sulfasalazine until you start Lialda.  -- Plan for colonoscopy next fall 2018  -- Due for Tentanus shot and recommend pneumonia vaccine  -- Recommend DEXA scan given prior steroid use  You can schedule your DEXA scan by calling [phone].  The scan is done on the first floor at the [facility].  The appointment is 30 minutes.  It is recommended that you wear light clothing, no zippers and no metal (including underwire of bra)  You are to withhold your calcium supplement for 48 hours prior to the scan.      Return to GI Clinic in 6 months to review your progress.    ______________________________________________________________________    Who do I call with any questions after my visit?  Please be in touch if there are any further questions that arise following today's visit.  There are multiple ways to contact your gastroenterology care team.        During business hours, you may reach a Gastroenterology nurse at [phone].       To schedule or reschedule an appointment, please call [phone].       You can always send a secure message through [employer] technology.  [employer] messages are  answered by your nurse or doctor typically within 24 hours.  Please allow extra time on weekends and holidays.        For urgent/emergent questions after business hours, you may reach the on-call GI Fellow by contacting the [facility]  at [phone].     How will I get the results of any tests ordered?    You will receive all of your results.  If you have signed up for [employer]oxxhart, any tests ordered at your visit will be available to you after your physician reviews them.  Typically this takes 1-2 weeks.  If there are urgent results that require a change in your care plan, your physician or nurse will call you to discuss the next steps.      What is Riboxxhart?  [employer] is a secure way for you to access all of your healthcare records from the [facility].  It is a web based computer program, so you can sign on to it from any location.  It also allows you to send secure messages to your care team.  I recommend signing up for Cloud[employer] access if you have not already done so and are comfortable with using a computer.      How to I schedule a follow-up visit?  If you did not schedule a follow-up visit today, please call [phone] to schedule a follow-up office visit.        Sincerely,    Kory Reese PA-C  [facility]  Division of Gastroenterology                Follow-ups after your visit        Follow-up notes from your care team     Return in about 6 months (around 2/23/2018).      Your next 10 appointments already scheduled     Sep 13, 2017 11:25 AM CDT   (Arrive by 11:10 AM)   Return Visit with Barbara Nye MD   [facility] ([facility])    [address] [place] [id number] [phone]            Sep 28, 2017 12:10 PM CDT   (Arrive by 11:55 AM)   New Patient Visit with Derrick Simpson MD   [facility] ([facility])    [facility]  "[address] [address] [phone]              Future tests that were ordered for you today     Open Future Orders        Priority Expected Expires Ordered    DX Hip/Pelvis/Spine Routine  8/23/2018 8/23/2017            Who to contact     Please call your clinic at [phone] to:    Ask questions about your health    Make or cancel appointments    Discuss your medicines    Learn about your test results    Speak to your doctor   If you have compliments or concerns about an experience at your clinic, or if you wish to file a complaint, please contact [facility] Patient Relations at [phone] or email us at [email or web address]         Additional Information About Your Visit        [employer] Information     [employer] gives you secure access to your electronic health record. If you see a primary care provider, you can also send messages to your care team and make appointments. If you have questions, please call your primary care clinic.  If you do not have a primary care provider, please call [phone] and they will assist you.      [employer] is an electronic gateway that provides easy, online access to your medical records. With A[employer]s, you can request a clinic appointment, read your test results, renew a prescription or communicate with your care team.     To access your existing account, please contact your [facility] or call [phone] for assistance.        Care EveryWhere ID     This is your Care EveryWhere ID. This could be used by other organizations to access your [place] medical records  [id number]        Your Vitals Were     Pulse Height Pulse Oximetry BMI (Body Mass Index)          67 1.753 m (5' 9\") 98% 30.27 kg/m2         Blood Pressure from Last 3 Encounters:   08/23/17 (!) 162/108   07/14/17 130/78   06/30/17 (!) 151/101    Weight from Last 3 Encounters:   08/23/17 93 kg (205 lb)   07/14/17 " 94.8 kg (209 lb)   01/29/16 96.1 kg (211 lb 12.8 oz)                 Today's Medication Changes          These changes are accurate as of: 8/23/17  8:58 AM.  If you have any questions, ask your nurse or doctor.               Start taking these medicines.        Dose/Directions    mesalamine 1.2 G EC tablet   Commonly known as:  LIALDA   Used for:  Ulcerative pancolitis without complication (H)   Started by:  Kory Reese PA-C        Dose:  2400 mg   Take 2 tablets (2,400 mg) by mouth every morning   Quantity:  180 tablet   Refills:  1            Where to get your medicines      These medications were sent to [email or web address] - [place], [place] - [address] [place] [address]  [address] [place] [address]     Phone:  [phone]     mesalamine 1.2 G EC tablet                Primary Care Provider    None Specified       No primary provider on file.        Equal Access to Services     MARY JANE [facility] CenterBETSY : Hadnereida Garcia, ricardo anguiano, shante kaalmajose maria coleman, tegan pérez . So [facility] [phone].    ATENCIÓN: Si habla español, tiene a boyd disposición servicios gratuitos de asistencia lingüística. Llame al [phone].    We comply with applicable federal civil rights laws and [place] laws. We do not discriminate on the basis of race, color, national origin, age, disability sex, sexual orientation or gender identity.            Thank you!     Thank you for choosing [facility] GASTROENTEROLOGY AND IBD  for your care. Our goal is always to provide you with excellent care. Hearing back from our patients is one way we can continue to improve our services. Please take a few minutes to complete the written survey that you may receive in the mail after your visit with us. Thank you!             Your Updated Medication List - Protect others around you: Learn how to safely use, store and throw away your medicines at www.disposemymeds.org.          This list is accurate as of: 8/23/17   "8:58 AM.  Always use your most recent med list.                   Brand Name Dispense Instructions for use Diagnosis    baclofen 10 MG tablet    LIORESAL    90 tablet    Take 1 tablet (10 mg) by mouth 2 times daily    Chronic low back pain, unspecified back pain laterality, with sciatica presence unspecified       escitalopram 20 MG tablet    LEXAPRO    90 tablet    Take 1 tablet (20 mg) by mouth daily    Major depressive disorder, single episode, moderate (H)       FOLIC ACID PO      Take 400 mcg by mouth daily        gabapentin 300 MG capsule    NEURONTIN    30 capsule    Take 1 capsule (300 mg) by mouth At Bedtime    Chronic low back pain, unspecified back pain laterality, with sciatica presence unspecified       mesalamine 1.2 G EC tablet    LIALDA    180 tablet    Take 2 tablets (2,400 mg) by mouth every morning    Ulcerative pancolitis without complication (H)       oxyCODONE-acetaminophen 5-325 MG per tablet    PERCOCET    120 tablet    Take 1 tablet by mouth every 6 hours as needed for moderate to severe pain    Chronic low back pain, unspecified back pain laterality, with sciatica presence unspecified, Chronic neck pain, Chronic narcotic use       sulfaSALAzine 500 MG tablet    AZULFIDINE    180 tablet    Take 2 tablets (1,000 mg) by mouth 2 times daily    Ulcerative pancolitis without complication (H)       TYLENOL PO      Take 1 tablet by mouth every 4 hours as needed for mild pain or fever '\" I take 2500 to 3500 mg of tylenol daily.\"          "

## 2017-08-23 NOTE — PATIENT INSTRUCTIONS
It was a pleasure taking care of you today.  I've included a brief summary of our discussion and care plan from today's visit below.  Please review this information with your primary care provider.  ______________________________________________________________________    My recommendations are summarized as follows:    -- Start Lialda 2.4 g per day (2 tablets daily).  Continue sulfasalazine until you start Lialda.  -- Plan for colonoscopy next fall 2018  -- Due for Tentanus shot and recommend pneumonia vaccine  -- Recommend DEXA scan given prior steroid use  You can schedule your DEXA scan by calling [phone].  The scan is done on the first floor at the [facility].  The appointment is 30 minutes.  It is recommended that you wear light clothing, no zippers and no metal (including underwire of bra)  You are to withhold your calcium supplement for 48 hours prior to the scan.      Return to GI Clinic in 6 months to review your progress.    ______________________________________________________________________    Who do I call with any questions after my visit?  Please be in touch if there are any further questions that arise following today's visit.  There are multiple ways to contact your gastroenterology care team.        During business hours, you may reach a Gastroenterology nurse at [phone].       To schedule or reschedule an appointment, please call [phone].       You can always send a secure message through [employer].  [employer] messages are answered by your nurse or doctor typically within 24 hours.  Please allow extra time on weekends and holidays.        For urgent/emergent questions after business hours, you may reach the on-call GI Fellow by contacting the [facility] at [phone].     How will I get the results of any tests ordered?    You will receive all of your results.  If you have signed up for MyChart, any tests ordered at your visit will be  available to you after your physician reviews them.  Typically this takes 1-2 weeks.  If there are urgent results that require a change in your care plan, your physician or nurse will call you to discuss the next steps.      What is Presentigohart?  [employer] is a secure way for you to access all of your healthcare records from the [facility].  It is a web based computer program, so you can sign on to it from any location.  It also allows you to send secure messages to your care team.  I recommend signing up for [employer] access if you have not already done so and are comfortable with using a computer.      How to I schedule a follow-up visit?  If you did not schedule a follow-up visit today, please call [phone] to schedule a follow-up office visit.        Sincerely,    Kory Reese PA-C  [facility]  Division of Gastroenterology

## 2017-08-23 NOTE — NURSING NOTE
"Chief Complaint   Patient presents with     RECHECK     pathology from colonoscopy . abdominal pain        Vitals:    08/23/17 0813   BP: (!) 162/108   Pulse: 67   SpO2: 98%   Weight: 93 kg (205 lb)   Height: 1.753 m (5' 9\")       Body mass index is 30.27 kg/(m^2).                            "

## 2017-09-11 ENCOUNTER — PRE VISIT (OUTPATIENT)
Dept: ANESTHESIOLOGY | Facility: CLINIC | Age: 52
End: 2017-09-11

## 2017-09-11 NOTE — TELEPHONE ENCOUNTER
1.  Date/reason for appt: 9/28/17 12:10PM Chronic low back pain, unspecified back pain laterality, with sciatica presence unspecified Chronic neck pain Chronic pain of left knee  2.  Referring provider:  JACE WOLFF CNP   3.  Call to patient (Yes / No - short description): No, pt was referred.   4.  Previous care at / records requested from:   [facility] Yojana [facility]ROY[facility] & Chris CLANCY CNP  -- Recs are in [facility] Andrew ARROYO - Faxed cover sheet to [facility]. rec

## 2017-09-13 ENCOUNTER — TELEPHONE (OUTPATIENT)
Dept: INTERNAL MEDICINE | Facility: CLINIC | Age: 52
End: 2017-09-13

## 2017-09-13 DIAGNOSIS — M54.5 CHRONIC LOW BACK PAIN, UNSPECIFIED BACK PAIN LATERALITY, WITH SCIATICA PRESENCE UNSPECIFIED: ICD-10-CM

## 2017-09-13 DIAGNOSIS — M54.2 CHRONIC NECK PAIN: ICD-10-CM

## 2017-09-13 DIAGNOSIS — G89.29 CHRONIC LOW BACK PAIN, UNSPECIFIED BACK PAIN LATERALITY, WITH SCIATICA PRESENCE UNSPECIFIED: ICD-10-CM

## 2017-09-13 DIAGNOSIS — F11.90 CHRONIC NARCOTIC USE: ICD-10-CM

## 2017-09-13 DIAGNOSIS — G89.29 CHRONIC NECK PAIN: ICD-10-CM

## 2017-09-13 RX ORDER — OXYCODONE AND ACETAMINOPHEN 5; 325 MG/1; MG/1
1 TABLET ORAL EVERY 6 HOURS PRN
Qty: 120 TABLET | Refills: 0 | Status: SHIPPED | OUTPATIENT
Start: 2017-09-13

## 2017-09-13 NOTE — TELEPHONE ENCOUNTER
Will provide with one refill. He has appt with our [facility] n[facility] 9/28. REceived outside records from Pain clinic in [facility]. MRI of lumbar spine in Feb 2017 with multiple level degenerative changes:  Scheuermann-like verbetral body enplate changes with multilevel Schorl's nodes. Edema assocaited with them, extending to adjacent endplates and vertebral bodies L1-L2, L3-4, and L5-S1. Also degenerative disch changes, and moderate left facet arthropatgy with L4-5. No spinal canal stenosii or transiting nerve root impingement.  He has been on chronic percocets dating back as far back as 2011 from [facility] clinic. No evidence of early refills or escalating doses. That clinic has closed and thus he has been requesting we take over narcotic prescribing.  I will be leaving the [place] in October and he will need to establish care with a new provider.  --------------------  Rx mailed to pt's home address per pt request.  Ninoska Quigley RN

## 2017-09-13 NOTE — TELEPHONE ENCOUNTER
Pt called, stated that he is not able to keep the appt with  today due to transportation issue. Pt said he lives in [place] and and missed the train this morning.  Pt is requesting to get a  Refill on Percocet today.  Scheduled to see pain clinic on 9/28/17.  Please advise.  Ninoska Quigley RN

## 2017-09-21 NOTE — TELEPHONE ENCOUNTER
Faxed cover sheet to [facility] to obtain outside recs - 2nd fax sent

## 2017-10-02 NOTE — TELEPHONE ENCOUNTER
Encounter closed in error, called and spoke with Viktoria from [facility] who states they need an TAE to fax pt's recs to us. Found TAE form in [employer] will fax cover sheet along with signed TAE to have recs faxed to us ASAP.

## 2018-01-11 ENCOUNTER — TRANSFERRED RECORDS (OUTPATIENT)
Dept: HEALTH INFORMATION MANAGEMENT | Facility: CLINIC | Age: 53
End: 2018-01-11

## 2018-02-22 ENCOUNTER — TELEPHONE (OUTPATIENT)
Dept: GASTROENTEROLOGY | Facility: CLINIC | Age: 53
End: 2018-02-22

## 2018-02-26 ENCOUNTER — OFFICE VISIT (OUTPATIENT)
Dept: GASTROENTEROLOGY | Facility: CLINIC | Age: 53
End: 2018-02-26
Payer: COMMERCIAL

## 2018-02-26 ENCOUNTER — TELEPHONE (OUTPATIENT)
Dept: GASTROENTEROLOGY | Facility: CLINIC | Age: 53
End: 2018-02-26

## 2018-02-26 VITALS
SYSTOLIC BLOOD PRESSURE: 153 MMHG | DIASTOLIC BLOOD PRESSURE: 96 MMHG | HEIGHT: 69 IN | OXYGEN SATURATION: 98 % | BODY MASS INDEX: 32.15 KG/M2 | HEART RATE: 75 BPM | WEIGHT: 217.1 LBS | TEMPERATURE: 99.3 F

## 2018-02-26 DIAGNOSIS — K51.00 ULCERATIVE PANCOLITIS WITHOUT COMPLICATION (H): ICD-10-CM

## 2018-02-26 DIAGNOSIS — K51.00 ULCERATIVE PANCOLITIS WITHOUT COMPLICATION (H): Primary | ICD-10-CM

## 2018-02-26 LAB
ALBUMIN SERPL-MCNC: 4.1 G/DL (ref 3.4–5)
ALP SERPL-CCNC: 78 U/L (ref 40–150)
ALT SERPL W P-5'-P-CCNC: 60 U/L (ref 0–70)
ANION GAP SERPL CALCULATED.3IONS-SCNC: 6 MMOL/L (ref 3–14)
AST SERPL W P-5'-P-CCNC: 48 U/L (ref 0–45)
BASOPHILS # BLD AUTO: 0.1 10E9/L (ref 0–0.2)
BASOPHILS NFR BLD AUTO: 1.4 %
BILIRUB DIRECT SERPL-MCNC: 0.1 MG/DL (ref 0–0.2)
BILIRUB SERPL-MCNC: 0.4 MG/DL (ref 0.2–1.3)
BUN SERPL-MCNC: 14 MG/DL (ref 7–30)
CALCIUM SERPL-MCNC: 9.3 MG/DL (ref 8.5–10.1)
CHLORIDE SERPL-SCNC: 104 MMOL/L (ref 94–109)
CO2 SERPL-SCNC: 29 MMOL/L (ref 20–32)
CREAT SERPL-MCNC: 1.01 MG/DL (ref 0.66–1.25)
CRP SERPL-MCNC: 3 MG/L (ref 0–8)
DEPRECATED CALCIDIOL+CALCIFEROL SERPL-MC: 35 UG/L (ref 20–75)
DIFFERENTIAL METHOD BLD: NORMAL
EOSINOPHIL # BLD AUTO: 0.3 10E9/L (ref 0–0.7)
EOSINOPHIL NFR BLD AUTO: 5.5 %
ERYTHROCYTE [DISTWIDTH] IN BLOOD BY AUTOMATED COUNT: 12 % (ref 10–15)
ERYTHROCYTE [SEDIMENTATION RATE] IN BLOOD BY WESTERGREN METHOD: 7 MM/H (ref 0–20)
FOLATE SERPL-MCNC: 20.8 NG/ML
GFR SERPL CREATININE-BSD FRML MDRD: 77 ML/MIN/1.7M2
GLUCOSE SERPL-MCNC: 118 MG/DL (ref 70–99)
HCT VFR BLD AUTO: 43.4 % (ref 40–53)
HGB BLD-MCNC: 14.7 G/DL (ref 13.3–17.7)
IMM GRANULOCYTES # BLD: 0 10E9/L (ref 0–0.4)
IMM GRANULOCYTES NFR BLD: 0.2 %
LYMPHOCYTES # BLD AUTO: 2.3 10E9/L (ref 0.8–5.3)
LYMPHOCYTES NFR BLD AUTO: 40.3 %
MCH RBC QN AUTO: 30.1 PG (ref 26.5–33)
MCHC RBC AUTO-ENTMCNC: 33.9 G/DL (ref 31.5–36.5)
MCV RBC AUTO: 89 FL (ref 78–100)
MONOCYTES # BLD AUTO: 0.5 10E9/L (ref 0–1.3)
MONOCYTES NFR BLD AUTO: 7.9 %
NEUTROPHILS # BLD AUTO: 2.6 10E9/L (ref 1.6–8.3)
NEUTROPHILS NFR BLD AUTO: 44.7 %
NRBC # BLD AUTO: 0 10*3/UL
NRBC BLD AUTO-RTO: 0 /100
PLATELET # BLD AUTO: 234 10E9/L (ref 150–450)
POTASSIUM SERPL-SCNC: 4.2 MMOL/L (ref 3.4–5.3)
PROT SERPL-MCNC: 7.6 G/DL (ref 6.8–8.8)
RBC # BLD AUTO: 4.89 10E12/L (ref 4.4–5.9)
SODIUM SERPL-SCNC: 138 MMOL/L (ref 133–144)
VIT B12 SERPL-MCNC: 642 PG/ML (ref 193–986)
WBC # BLD AUTO: 5.8 10E9/L (ref 4–11)

## 2018-02-26 RX ORDER — MESALAMINE 1.2 G/1
2400 TABLET, DELAYED RELEASE ORAL EVERY MORNING
Qty: 180 TABLET | Refills: 3 | Status: SHIPPED | OUTPATIENT
Start: 2018-02-26 | End: 2018-10-17

## 2018-02-26 RX ORDER — OXYCODONE AND ACETAMINOPHEN 10; 325 MG/1; MG/1
1 TABLET ORAL 2 TIMES DAILY
COMMUNITY
End: 2021-02-01 | Stop reason: DRUGHIGH

## 2018-02-26 NOTE — LETTER
Date:February 27, 2018      Patient was self referred, no letter generated. Do not send.        [facility] Information

## 2018-02-26 NOTE — LETTER
2/26/2018       RE: Parrish Muir  [address] [place] [address]     Dear Colleague,    Thank you for referring your patient, Parrish Muir, to the [facility] GASTRO[facility] AND IBD CLINIC at [facility]. Please see a copy of my visit note below.    GI CLINIC VISIT    CC/REFERRING PROVIDER: Referred Self  REASON FOR CONSULTATION: UC    HPI: 52 year old male w/ h/o UC pan-colitis since 2003 currently on SSA monotherapy, chronic pain syndrome on chronic narcotic pain medications HTN, depression - presenting for f/u UC. Doing well overall, reports having 1 soft formed BM/day w/o blood (baseline). Of note, pt has since d/c'd all THC use since starting care w/ new [facility]; interval stabilization of chronic narcotic pain medication regimen w/ longer-acting agents (pt reports improved overall pain control w/ current regimen, encouraged by his progress). Denies any tenesmus or insecurity passing flatus, no fecal incontinence or new perianal/nocturnal sxs noted. Denies any N/V/F/C/HA/NS or other const/syst/cardiopulmonary sxs, no BRBPR/melena/urinary changes, no unintentional wt loss or appetite/satiety changes. No other bowel/bladder habit changes, no dysphagia/odynophagia. No jaundice/icterus/pruritus, no acholic stools/steatorrhea, no new lumps/bumps, no jt pain/oral ulcer/rash/eye sxs noted.    ROS: 10pt ROS performed and otherwise negative.    PERTINENT PAST MEDICAL/SURGICAL HISTORY:  As noted above.    PERTINENT MEDICATIONS:  - Lialda 2.4g PO QAM  Medications reviewed with patient today, see Medication List/Assessment for details.  No other NSAID/anticoagulation reported by patient.  No other OTC/herbal/supplements reported by patient.    SOCIAL HISTORY: Tobacco: none.    PHYSICAL EXAMINATION:  Vitals reviewed, AFVSS  Wt 217# today (incr)  Gen: aaox3, cooperative, pleasant, not diaphoretic, nad  HEENT: ncat, neck supple, no clad/sclad, normal op  w/o ulcer/exudate, anicteric, mmm  Resp/CV without acute findings, not dyspneic/tachycardic  Abd: +nabs, soft, nt, nd, no peritoneal s/s noted  Ext: no c/c/e  Skin: warm, perfused, no jaundice  Neuro: grossly intact, no asterixis noted    PERTINENT STUDIES:  Labs ordered today, currently pending    ASSESSMENT/PLAN:    1. UC Pan-colitis with excellent ongoing clinical remission on PO 5-ASA monotherapy - continue supportive care as ordered for now  1. It was wonderful getting a chance to meet with you to discuss your UC Pan-colitis, particularly given your ongoing excellent clinical remission - this is great to see, keep up the good work!  - Continue Lialda 2.4g daily as ordered for now.  - Recommend moving forward with a dysplasia surveillance colonoscopy this Summer. Given the recent stabilization of your chronic pain regimen (particularly with the cessation of THC use), will schedule this at [facility] with MAC sedation. If MAC sedation again proves challenging, may need to move forward with General Anesthesia for your future procedures.  - Discussed Preventive Care in IBD today.    2. Recommend routine studies including nutritional and inflammatory markers as we discussed today.   - Recommend having the following studies checked at least 1-2 times/year for disease and medication safety monitoring: BMP, LFT, CBC with differential, ESR/CRP.    3. Continue to monitor for the danger signs/symptoms we reviewed together today:  worsening abdominal pain, worsening diarrhea, obstructive symptoms (nausea/vomiting, abdominal distention, inability to pass stool/flatus), blood mixed into stools, persistent fevers/chills, progressive anemia (particulary with iron deficiency), unexpected weight loss, etc.  - Contact us via [employer] should these symptoms occur, particularly as we may advise further evaluation accordingly.    2. Colorectal Cancer Screening  No PSC or known FH CRC, will plan for routine dysplasia  surveillance colonoscopy this Summer.    RTC 1 year with GI PA or me, sooner if symptomatic.      Thank you for this consultation. It was a pleasure to participate in the care of this patient; please contact us with any further questions.     Francisco Grigsby MD   of Medicine  [facility] - Department of Medicine  Division of Gastroenterology          Again, thank you for allowing me to participate in the care of your patient.      Sincerely,    Francisco Grigsby MD

## 2018-02-26 NOTE — PROGRESS NOTES
GI CLINIC VISIT    CC/REFERRING PROVIDER: Referred Self  REASON FOR CONSULTATION: UC    HPI: 52 year old male w/ h/o UC pan-colitis since 2003 currently on SSA monotherapy, chronic pain syndrome on chronic narcotic pain medications HTN, depression - presenting for f/u UC. Doing well overall, reports having 1 soft formed BM/day w/o blood (baseline). Of note, pt has since d/c'd all THC use since starting care w/ new [facility]; interval stabilization of chronic narcotic pain medication regimen w/ longer-acting agents (pt reports improved overall pain control w/ current regimen, encouraged by his progress). Denies any tenesmus or insecurity passing flatus, no fecal incontinence or new perianal/nocturnal sxs noted. Denies any N/V/F/C/HA/NS or other const/syst/cardiopulmonary sxs, no BRBPR/melena/urinary changes, no unintentional wt loss or appetite/satiety changes. No other bowel/bladder habit changes, no dysphagia/odynophagia. No jaundice/icterus/pruritus, no acholic stools/steatorrhea, no new lumps/bumps, no jt pain/oral ulcer/rash/eye sxs noted.    ROS: 10pt ROS performed and otherwise negative.    PERTINENT PAST MEDICAL/SURGICAL HISTORY:  As noted above.    PERTINENT MEDICATIONS:  - Lialda 2.4g PO QAM  Medications reviewed with patient today, see Medication List/Assessment for details.  No other NSAID/anticoagulation reported by patient.  No other OTC/herbal/supplements reported by patient.    SOCIAL HISTORY: Tobacco: none.    PHYSICAL EXAMINATION:  Vitals reviewed, AFVSS  Wt 217# today (incr)  Gen: aaox3, cooperative, pleasant, not diaphoretic, nad  HEENT: ncat, neck supple, no clad/sclad, normal op w/o ulcer/exudate, anicteric, mmm  Resp/CV without acute findings, not dyspneic/tachycardic  Abd: +nabs, soft, nt, nd, no peritoneal s/s noted  Ext: no c/c/e  Skin: warm, perfused, no jaundice  Neuro: grossly intact, no asterixis noted    PERTINENT STUDIES:  Labs ordered today, currently  pending    ASSESSMENT/PLAN:    1. UC Pan-colitis with excellent ongoing clinical remission on PO 5-ASA monotherapy - continue supportive care as ordered for now  1. It was wonderful getting a chance to meet with you to discuss your UC Pan-colitis, particularly given your ongoing excellent clinical remission - this is great to see, keep up the good work!  - Continue Lialda 2.4g daily as ordered for now.  - Recommend moving forward with a dysplasia surveillance colonoscopy this Summer. Given the recent stabilization of your chronic pain regimen (particularly with the cessation of THC use), will schedule this at [facility] with MAC sedation. If MAC sedation again proves challenging, may need to move forward with General Anesthesia for your future procedures.  - Discussed Preventive Care in IBD today.    2. Recommend routine studies including nutritional and inflammatory markers as we discussed today.   - Recommend having the following studies checked at least 1-2 times/year for disease and medication safety monitoring: BMP, LFT, CBC with differential, ESR/CRP.    3. Continue to monitor for the danger signs/symptoms we reviewed together today:  worsening abdominal pain, worsening diarrhea, obstructive symptoms (nausea/vomiting, abdominal distention, inability to pass stool/flatus), blood mixed into stools, persistent fevers/chills, progressive anemia (particulary with iron deficiency), unexpected weight loss, etc.  - Contact us via [employer] should these symptoms occur, particularly as we may advise further evaluation accordingly.    2. Colorectal Cancer Screening  No PSC or known FH CRC, will plan for routine dysplasia surveillance colonoscopy this Summer.    RTC 1 year with GI PA or me, sooner if symptomatic.      Thank you for this consultation. It was a pleasure to participate in the care of this patient; please contact us with any further questions.     Francisco Grigsby MD   of  Medicine  [facility] - Department of Medicine  Division of Gastroenterology

## 2018-02-26 NOTE — NURSING NOTE
"No chief complaint on file.      Vitals:    02/26/18 0804   BP: (!) 153/96   BP Location: Left arm   Patient Position: Chair   Cuff Size: Adult Regular   Pulse: 75   Temp: 99.3  F (37.4  C)   SpO2: 98%   Weight: 217 lb 1.6 oz   Height: 5' 9\"       Body mass index is 32.06 kg/(m^2).      Nicole Boykin                          "

## 2018-02-26 NOTE — PATIENT INSTRUCTIONS
I've included a brief summary of our discussion and care plan from today's visit below.  Please review this information with your primary care provider.  _______________________________________________________________________      1. It was wonderful getting a chance to meet with you to discuss your UC Pan-colitis, particularly given your ongoing excellent clinical remission - this is great to see, keep up the good work!  - Continue Lialda 2.4g daily as ordered for now.  - Recommend moving forward with a dysplasia surveillance colonoscopy this Summer. Given the recent stabilization of your chronic pain regimen (particularly with the cessation of THC use), will schedule this at [facility] with MAC sedation. If MAC sedation again proves challenging, may need to move forward with General Anesthesia for your future procedures.  - Discussed Preventive Care in IBD today.    2. Recommend routine studies including nutritional and inflammatory markers as we discussed today.   - Recommend having the following studies checked at least 1-2 times/year for disease and medication safety monitoring: BMP, LFT, CBC with differential, ESR/CRP.    3. Continue to monitor for the danger signs/symptoms we reviewed together today:  worsening abdominal pain, worsening diarrhea, obstructive symptoms (nausea/vomiting, abdominal distention, inability to pass stool/flatus), blood mixed into stools, persistent fevers/chills, progressive anemia (particulary with iron deficiency), unexpected weight loss, etc.  - Contact us via [employer] should these symptoms occur, particularly as we may advise further evaluation accordingly.    4. Return to GI Clinic with my GI Physician Assistant (Kory Reese) or me in 1 year to review your progress, sooner if symptomatic.   - If you are unable to schedule this follow-up appointment today, please contact Keyla Rodas at [phone] within the next week to help set up this necessary  appointment.    PREVENTIVE CARE IN INFLAMMATORY BOWEL DISEASE    - Strongly recommend tobacco abstinence.    - Recommend considering daily calcium + Vitamin D supplementation.    - Recommend age-appropriate cancer surveillance:  - Yearly Dermatology visit for visual skin inspection if immunosuppressed, monthly skin self-check after bathing.  - Dysplasia surveillance colonoscopy every 1-2 years in patients with Crohn's colitis or ulcerative colitis >8-10 years since diagnosis.  - (For men and women ages 9-26) Consideration for HPV vaccination, should be discussed with your PCP further if you feel you'd like to pursue this.  - (For women) Annual Pap smears if immunosuppressed, mammogram when deemed appropriate by your PCP.    - Recommend follow-up with your PCP to ensure the following vaccinations have been updated: Hepatitis A/B, Tdap, Pneumococcal pneumonia, yearly flu SHOT, Meningococcal meningitis (if college-age), HPV (all aged 18-26), etc.  - Would also recommend VZV and MMR titers be checked to confirm immunity.  - Live/attenuated vaccines (such as the INTRANASAL flu vaccine, MMR, Yellow Fever, or Varicella/VZV vaccine) should not be administered to immunosuppressed patients, except in very specific instances which you should discuss with a GI and Infectious Disease provider first.    - Family planning:  If you or your partner are planning to become pregnant, please schedule time with us to discuss issues surrounding IBD and Fertility/Pregnancy.      _______________________________________________________________________    It was a pleasure seeing you in clinic today - please be in touch if there are any further questions that arise following today's visit.  During business hours, you may reach Clinic Nurse Triage Line at [phone].  For urgent/emergent questions after business hours, you may reach the on-call GI Fellow by contacting the [facility]  at [phone].    Any  benign/non-urgent test results are usually communicated via letter or MyChart message within 1-2 weeks after completion.  Urgent results (those that require a change in the previously-discussed care plan) are usually communicated via a phone call once available from our clinic staff to discuss the results and the next steps in your evaluation.    I recommend signing up for [employer] access if you have not already done so and are comfortable with using a computer.  This allows for online access to your lab results and also helps you communicate efficiently with my clinic should any questions arise in your care.    We have Financial Counseling services available through our clinic.  If you have questions about your insurance coverage or payment responsibilities, particularly before you undergo any tests or procedures, please let us know and we can arrange a consultation accordingly to help you make informed decisions about your healthcare.    Sincerely,    Francisco Grigsby MD    [facility] - Department of Medicine  Division of Gastroenterology

## 2018-02-26 NOTE — MR AVS SNAPSHOT
After Visit Summary   2/26/2018    Parrish Muir    MRN: [id number]           Patient Information     Date Of Birth          1965        Visit Information        Provider Department      2/26/2018 8:40 AM Francisco Grigsby MD [facility]        Today's Diagnoses     Ulcerative pancolitis without complication (H)    -  1      Care Instructions    I've included a brief summary of our discussion and care plan from today's visit below.  Please review this information with your primary care provider.  _______________________________________________________________________      1. It was wonderful getting a chance to meet with you to discuss your UC Pan-colitis, particularly given your ongoing excellent clinical remission - this is great to see, keep up the good work!  - Continue Lialda 2.4g daily as ordered for now.  - Recommend moving forward with a dysplasia surveillance colonoscopy this Summer. Given the recent stabilization of your chronic pain regimen (particularly with the cessation of THC use), will schedule this at [facility] with MAC sedation. If MAC sedation again proves challenging, may need to move forward with General Anesthesia for your future procedures.  - Discussed Preventive Care in IBD today.    2. Recommend routine studies including nutritional and inflammatory markers as we discussed today.   - Recommend having the following studies checked at least 1-2 times/year for disease and medication safety monitoring: BMP, LFT, CBC with differential, ESR/CRP.    3. Continue to monitor for the danger signs/symptoms we reviewed together today:  worsening abdominal pain, worsening diarrhea, obstructive symptoms (nausea/vomiting, abdominal distention, inability to pass stool/flatus), blood mixed into stools, persistent fevers/chills, progressive anemia (particulary with iron deficiency), unexpected weight loss, etc.  - Contact us via [employer] should these  symptoms occur, particularly as we may advise further evaluation accordingly.    4. Return to GI Clinic with my GI Physician Assistant (Kory Reese) or me in 1 year to review your progress, sooner if symptomatic.   - If you are unable to schedule this follow-up appointment today, please contact Keyla Rodas at [phone] within the next week to help set up this necessary appointment.    PREVENTIVE CARE IN INFLAMMATORY BOWEL DISEASE    - Strongly recommend tobacco abstinence.    - Recommend considering daily calcium + Vitamin D supplementation.    - Recommend age-appropriate cancer surveillance:  - Yearly Dermatology visit for visual skin inspection if immunosuppressed, monthly skin self-check after bathing.  - Dysplasia surveillance colonoscopy every 1-2 years in patients with Crohn's colitis or ulcerative colitis >8-10 years since diagnosis.  - (For men and women ages 9-26) Consideration for HPV vaccination, should be discussed with your PCP further if you feel you'd like to pursue this.  - (For women) Annual Pap smears if immunosuppressed, mammogram when deemed appropriate by your PCP.    - Recommend follow-up with your PCP to ensure the following vaccinations have been updated: Hepatitis A/B, Tdap, Pneumococcal pneumonia, yearly flu SHOT, Meningococcal meningitis (if college-age), HPV (all aged 18-26), etc.  - Would also recommend VZV and MMR titers be checked to confirm immunity.  - Live/attenuated vaccines (such as the INTRANASAL flu vaccine, MMR, Yellow Fever, or Varicella/VZV vaccine) should not be administered to immunosuppressed patients, except in very specific instances which you should discuss with a GI and Infectious Disease provider first.    - Family planning:  If you or your partner are planning to become pregnant, please schedule time with us to discuss issues surrounding IBD and Fertility/Pregnancy.      _______________________________________________________________________    It was a pleasure  seeing you in clinic today - please be in touch if there are any further questions that arise following today's visit.  During business hours, you may reach Clinic Nurse Triage Line at [phone].  For urgent/emergent questions after business hours, you may reach the on-call GI Fellow by contacting the [facility]  at [phone].    Any benign/non-urgent test results are usually communicated via letter or LearnBophart message within 1-2 weeks after completion.  Urgent results (those that require a change in the previously-discussed care plan) are usually communicated via a phone call once available from our clinic staff to discuss the results and the next steps in your evaluation.    I recommend signing up for [email or web address] access if you have not already done so and are comfortable with using a computer.  This allows for online access to your lab results and also helps you communicate efficiently with my clinic should any questions arise in your care.    We have Financial Counseling services available through our clinic.  If you have questions about your insurance coverage or payment responsibilities, particularly before you undergo any tests or procedures, please let us know and we can arrange a consultation accordingly to help you make informed decisions about your healthcare.    Sincerely,    Francisco Grigsby MD    [facility] - Department of Medicine  Division of Gastroenterology            Follow-ups after your visit        Additional Services     GASTROENTEROLOGY ADULT REF PROCEDURE ONLY       Last Lab Result: Creatinine (mg/dL)       Date                     Value                 06/30/2017               0.96             ----------  Body mass index is 32.06 kg/(m^2).     Needed:  No  Language:  English    Patient will be contacted to schedule procedure.     Please be aware that coverage of these services is subject to the terms and limitations of your  health insurance plan.  Call member services at your health plan with any benefit or coverage questions.  Any procedures must be performed at a [facility] facility OR coordinated by your clinic's referral office.    Please bring the following with you to your appointment:    (1) Any X-Rays, CTs or MRIs which have been performed.  Contact the facility where they were done to arrange for  prior to your scheduled appointment.    (2) List of current medications   (3) This referral request   (4) Any documents/labs given to you for this referral                  Follow-up notes from your care team     Return in about 1 year (around 2/26/2019).      Your next 10 appointments already scheduled     Feb 18, 2019  8:40 AM CST   (Arrive by 8:25 AM)   RETURN INFLAMMATORY BOWEL DISEASE with Francisco Grigsby MD   [facility] ([facility])    [address] [place] [id number] [phone]              Future tests that were ordered for you today     Open Future Orders        Priority Expected Expires Ordered    CRP inflammation Routine  4/27/2018 2/26/2018    Vitamin B12 Routine  4/27/2018 2/26/2018    Folate Routine  4/27/2018 2/26/2018    Basic metabolic panel Routine  4/27/2018 2/26/2018    CBC with platelets differential Routine  4/27/2018 2/26/2018    Erythrocyte sedimentation rate auto Routine  4/27/2018 2/26/2018    Hepatic panel Routine  4/27/2018 2/26/2018    Vitamin D Deficiency Routine  4/27/2018 2/26/2018            Who to contact     Please call your clinic at [phone] to:    Ask questions about your health    Make or cancel appointments    Discuss your medicines    Learn about your test results    Speak to your doctor            Additional Information About Your Visit        [employer] Information     [employer] gives you secure access to your electronic health record. If you see a primary care provider, you can also send messages to your  "care team and make appointments. If you have questions, please call your primary care clinic.  If you do not have a primary care provider, please call [phone] and they will assist you.      [employer] is an electronic gateway that provides easy, online access to your medical records. With [employer], you can request a clinic appointment, read your test results, renew a prescription or communicate with your care team.     To access your existing account, please contact your [facility] or call [phone] for assistance.        Care EveryWhere ID     This is your Care EveryWhere ID. This could be used by other organizations to access your [facility] medical records  [id number]        Your Vitals Were     Pulse Temperature Height Pulse Oximetry BMI (Body Mass Index)       75 99.3  F (37.4  C) 1.753 m (5' 9\") 98% 32.06 kg/m2        Blood Pressure from Last 3 Encounters:   02/26/18 (!) 153/96   08/23/17 (!) 162/108   07/14/17 130/78    Weight from Last 3 Encounters:   02/26/18 98.5 kg (217 lb 1.6 oz)   08/23/17 93 kg (205 lb)   07/14/17 94.8 kg (209 lb)              We Performed the Following     GASTROENTEROLOGY ADULT REF PROCEDURE ONLY          Where to get your medicines      [id number] [address] [address]     Phone:  [phone]     mesalamine 1.2 G EC tablet          Primary Care Provider    None Specified       No primary provider on file.        Equal Access to Services     MARTHA LUIS AH: Hadii aad ku hadasho Soomaali, waaxda luqadaha, qaybta kaalmada adeegyada, tegan london. So [facility] [phone].    ATENCIÓN: Si habla español, tiene a boyd disposición servicios gratuitos de asistencia lingüística. Llame al [phone].    We comply with applicable federal civil rights laws and [place] laws. We do not discriminate on the basis of race, color, national origin, " age, disability, sex, sexual orientation, or gender identity.            Thank you!     Thank you for choosing [facility]  for your care. Our goal is always to provide you with excellent care. Hearing back from our patients is one way we can continue to improve our services. Please take a few minutes to complete the written survey that you may receive in the mail after your visit with us. Thank you!             Your Updated Medication List - Protect others around you: Learn how to safely use, store and throw away your medicines at www.disposemymeds.org.          This list is accurate as of 2/26/18  8:49 AM.  Always use your most recent med list.                   Brand Name Dispense Instructions for use Diagnosis    baclofen 10 MG tablet    LIORESAL    90 tablet    Take 1 tablet (10 mg) by mouth 2 times daily    Chronic low back pain, unspecified back pain laterality, with sciatica presence unspecified       escitalopram 20 MG tablet    LEXAPRO    90 tablet    Take 1 tablet (20 mg) by mouth daily    Major depressive disorder, single episode, moderate (H)       FOLIC ACID PO      Take 400 mcg by mouth daily        gabapentin 300 MG capsule    NEURONTIN    30 capsule    Take 1 capsule (300 mg) by mouth At Bedtime    Chronic low back pain, unspecified back pain laterality, with sciatica presence unspecified       mesalamine 1.2 G EC tablet    LIALDA    180 tablet    Take 2 tablets (2,400 mg) by mouth every morning    Ulcerative pancolitis without complication (H)       * oxyCODONE-acetaminophen  MG per tablet    PERCOCET     Take 1 tablet by mouth 2 times daily    Ulcerative pancolitis without complication (H)       * oxyCODONE-acetaminophen 5-325 MG per tablet    PERCOCET    120 tablet    Take 1 tablet by mouth every 6 hours as needed for moderate to severe pain    Chronic low back pain, unspecified back pain laterality, with sciatica presence unspecified, Chronic neck pain, Chronic  "narcotic use       sulfaSALAzine 500 MG tablet    AZULFIDINE    180 tablet    Take 2 tablets (1,000 mg) by mouth 2 times daily    Ulcerative pancolitis without complication (H)       TYLENOL PO      Take 1 tablet by mouth every 4 hours as needed for mild pain or fever '\" I take 2500 to 3500 mg of tylenol daily.\"        * Notice:  This list has 2 medication(s) that are the same as other medications prescribed for you. Read the directions carefully, and ask your doctor or other care provider to review them with you.      "

## 2018-04-16 ENCOUNTER — OFFICE VISIT (OUTPATIENT)
Dept: INTERNAL MEDICINE | Facility: CLINIC | Age: 53
End: 2018-04-16
Payer: COMMERCIAL

## 2018-04-16 VITALS
SYSTOLIC BLOOD PRESSURE: 158 MMHG | BODY MASS INDEX: 32.25 KG/M2 | HEART RATE: 92 BPM | OXYGEN SATURATION: 96 % | WEIGHT: 218.4 LBS | DIASTOLIC BLOOD PRESSURE: 99 MMHG

## 2018-04-16 DIAGNOSIS — S20.212S CONTUSION OF RIB ON LEFT SIDE, SEQUELA: ICD-10-CM

## 2018-04-16 DIAGNOSIS — I10 BENIGN ESSENTIAL HYPERTENSION: Primary | ICD-10-CM

## 2018-04-16 LAB
ALBUMIN UR-MCNC: NEGATIVE MG/DL
APPEARANCE UR: CLEAR
BILIRUB UR QL STRIP: NEGATIVE
COLOR UR AUTO: YELLOW
GLUCOSE UR STRIP-MCNC: NEGATIVE MG/DL
HGB UR QL STRIP: NEGATIVE
KETONES UR STRIP-MCNC: NEGATIVE MG/DL
LEUKOCYTE ESTERASE UR QL STRIP: NEGATIVE
MUCOUS THREADS #/AREA URNS LPF: PRESENT /LPF
NITRATE UR QL: NEGATIVE
PH UR STRIP: 6 PH (ref 5–7)
RBC #/AREA URNS AUTO: 1 /HPF (ref 0–2)
SOURCE: ABNORMAL
SP GR UR STRIP: 1.02 (ref 1–1.03)
SQUAMOUS #/AREA URNS AUTO: <1 /HPF (ref 0–1)
UROBILINOGEN UR STRIP-MCNC: 0 MG/DL (ref 0–2)
WBC #/AREA URNS AUTO: <1 /HPF (ref 0–5)

## 2018-04-16 RX ORDER — LOSARTAN POTASSIUM 50 MG/1
50 TABLET ORAL DAILY
Qty: 30 TABLET | Refills: 3 | Status: SHIPPED | OUTPATIENT
Start: 2018-04-16 | End: 2018-06-06

## 2018-04-16 RX ORDER — LIDOCAINE 40 MG/G
CREAM TOPICAL
Qty: 1 TUBE | Refills: 1 | Status: SHIPPED | OUTPATIENT
Start: 2018-04-16 | End: 2019-08-15

## 2018-04-16 RX ORDER — LIDOCAINE 40 MG/G
CREAM TOPICAL
Qty: 1 TUBE | Refills: 1 | Status: SHIPPED | OUTPATIENT
Start: 2018-04-16 | End: 2018-04-16

## 2018-04-16 ASSESSMENT — PAIN SCALES - GENERAL: PAINLEVEL: SEVERE PAIN (6)

## 2018-04-16 NOTE — PROGRESS NOTES
"[facility]  Primary Care Center   Azeb Lewis MD  04/16/2018      Chief Complaint:   No chief complaint on file.     3/30:  Fell on icie  4/2 when to [place]:  negative chest xray and ribs.  Great pain in the ribsusing ace bandages and ice. .  Left ant and back.  Fel on ice.  curtis wakes h im up.  NO SOB    History of Present Illness:   Parrish Muir is a 53 year old male with a history of ulcerative colitis and chronic back pain who presents for evaluation of left chest wall pain and hypertension.    Rib pain:   On March 30, 2018 he slipped and fell on the ice landing on his left arm in rib cage.  He went to the [facility] on April 2 where he was evaluated.  Chest x-ray and rib details were negative at that time.  Since then he has had tremendous pain but no shortness of breath.  He has been using Ace bandages and ice.  He is on chronic opiates which were not changed.  He wonders if he needs to have some ultrasound therapy by his chiropractor, as his daughter recommended.    Essential hypertension:  He states that he has had elevated blood pressure for \"sometime\" but now he has become worried that he may have a stroke or heart attack.  He has had no chest pain, claudication or TIAs, but has been reading about hypertension.  We reviewed his blood pressures in the chart and they are typically in the 150s systolic.  He had a BMP recently in February which was normal.  He has not had a urinalysis since 2005.    He has seen Dr. Dial in the past but lives in [place].  He prefers to get his primary care at the [facility].    Health Maintenance: Not addressed today  Immunization History   Administered Date(s) Administered     Zoster vaccine, live 04/17/2015         Review of Systems:   Pertinent items are noted in HPI, remainder of complete ROS is negative.      Active Medications:     Current Outpatient Prescriptions:      oxyCODONE-acetaminophen (PERCOCET)  MG per tablet, Take 1 " "tablet by mouth 2 times daily, Disp: , Rfl:      mesalamine (LIALDA) 1.2 G EC tablet, Take 2 tablets (2,400 mg) by mouth every morning, Disp: 180 tablet, Rfl: 3     oxyCODONE-acetaminophen (PERCOCET) 5-325 MG per tablet, Take 1 tablet by mouth every 6 hours as needed for moderate to severe pain, Disp: 120 tablet, Rfl: 0     escitalopram (LEXAPRO) 20 MG tablet, Take 1 tablet (20 mg) by mouth daily, Disp: 90 tablet, Rfl: 1     baclofen (LIORESAL) 10 MG tablet, Take 1 tablet (10 mg) by mouth 2 times daily, Disp: 90 tablet, Rfl: 1     gabapentin (NEURONTIN) 300 MG capsule, Take 1 capsule (300 mg) by mouth At Bedtime, Disp: 30 capsule, Rfl: 1     sulfaSALAzine (AZULFIDINE) 500 MG tablet, Take 2 tablets (1,000 mg) by mouth 2 times daily, Disp: 180 tablet, Rfl: 0     FOLIC ACID PO, Take 400 mcg by mouth daily, Disp: , Rfl:      Acetaminophen (TYLENOL PO), Take 1 tablet by mouth every 4 hours as needed for mild pain or fever '\" I take 2500 to 3500 mg of tylenol daily.\", Disp: , Rfl:       Allergies:   Morphine sulfate      Past Medical History:  Inflammatory bowel disease, ulcerative colitis  Hypertension  Moderate major depression  History of colonic polyps  Benign neoplasm of colon  Anxiety     Past Surgical History:  No past medical surgeries to note.    Family History:   Alzheimer disease - mother      Social History:   Social History   Substance Use Topics     Smoking status: Never Smoker     Smokeless tobacco: Never Used     Alcohol use No   Tobacco Use: none  Alcohol Use: none  PCP: No primary care provider on file.      Physical Exam:   BP (!) 158/99  Pulse 92  Wt 99.1 kg (218 lb 6.4 oz)  SpO2 96%  BMI 32.25 kg/m2  General: Anxious, worried, occasionally tearful 53-year-old man  Chest: Reproducible tenderness along ribs #8 through 10 on the left beginning anteriorly and extending posteriorly.  There is no bruising, discoloration, or swelling.  Ventilation is normal and there are no crackles or wheezes.  There is " no pleural rub.  Cardiac: Regular rate and rhythm with a pulse that was slightly rapid.  S1-S2 without murmur or gallop.  JVP is 7.      Chest x-ray from [facility] on April 2 showed no rib fractures, pneumothorax or pleural effusion     Last Basic Metabolic Panel:  Lab Results   Component Value Date     02/26/2018      Lab Results   Component Value Date    POTASSIUM 4.2 02/26/2018     Lab Results   Component Value Date    CHLORIDE 104 02/26/2018     Lab Results   Component Value Date    TABATHA 9.3 02/26/2018     Lab Results   Component Value Date    CO2 29 02/26/2018     Lab Results   Component Value Date    BUN 14 02/26/2018     Lab Results   Component Value Date    CR 1.01 02/26/2018     Lab Results   Component Value Date     02/26/2018       Assessment and Plan:  1.  Essential hypertension.  Plan to start losartan 50 mg daily.  Side effects reviewed.  RTC in 4-6 weeks for follow-up.  I plotted his interest in prevention of heart disease and PAD.  Will check urinalysis today.  GFR normal.    2.  Left chest wall pain.  This is due to rib contusion without fracture or pneumothorax.  He is in tremendous pain.  The pain is causing his blood pressure to be even more elevated.  Plan trial of 4% lidocaine cream 3 times a day as needed         Scribe Disclosure:   I, Arcelia Villa, am serving as a scribe to document services personally performed by Azeb Lewis MD at this visit, based upon the provider's statements to me. All documentation has been reviewed by the aforementioned provider prior to being entered into the official medical record.     Portions of this medical record were completed by a scribe. UPON MY REVIEW AND AUTHENTICATION BY ELECTRONIC SIGNATURE, this confirms (a) I performed the applicable clinical services, and (b) the record is accurate.

## 2018-04-16 NOTE — NURSING NOTE
Chief Complaint   Patient presents with     Hypertension     Patient is here to discuss elevated blood pressure.      Fall     Patient had a fall and left side of ribs are painful. Fell on 3/30/18.     Shruti Chavez LPN at 11:53 AM on 4/16/2018.

## 2018-04-16 NOTE — PATIENT INSTRUCTIONS
[facility]: [phone]     [facility] Medication Refill Request Information:  * Please contact your pharmacy regarding ANY request for medication refills.  ** [facility] Prescription Fax = [phone]  * Please allow 3 business days for routine medication refills.  * Please allow 5 business days for controlled substance medication refills.     [facility] Test Result notification information:  *You will be notified with in 7-10 days of your appointment day regarding the results of your test.  If you are on MyChart you will be notified as soon as the provider has reviewed the results and signed off on them.    Parrish was seen today for hypertension and fall.    Diagnoses and all orders for this visit:    Benign essential hypertension  -     losartan (COZAAR) 50 MG tablet; Take 1 tablet (50 mg) by mouth daily  -     UA with Micro (No Culture); Future    Contusion of rib on left side, sequela  -     lidocaine (LMX4) 4 % CREA cream; Apply topically once as needed for mild pain

## 2018-04-16 NOTE — MR AVS SNAPSHOT
After Visit Summary   4/16/2018    Parrish Muir    MRN: [id number]           Patient Information     Date Of Birth          1965        Visit Information        Provider Department      4/16/2018 11:10 AM Azeb Lewis MD [facility]        Today's Diagnoses     Benign essential hypertension    -  1    Contusion of rib on left side, sequela          Care Instructions    [facility]: [phone]     Primary Care Center Medication Refill Request Information:  * Please contact your pharmacy regarding ANY request for medication refills.  ** [facility] Prescription Fax = [phone]  * Please allow 3 business days for routine medication refills.  * Please allow 5 business days for controlled substance medication refills.     Primary Care Center Test Result notification information:  *You will be notified with in 7-10 days of your appointment day regarding the results of your test.  If you are on MyChart you will be notified as soon as the provider has reviewed the results and signed off on them.    Parrish was seen today for hypertension and fall.    Diagnoses and all orders for this visit:    Benign essential hypertension  -     losartan (COZAAR) 50 MG tablet; Take 1 tablet (50 mg) by mouth daily  -     UA with Micro (No Culture); Future    Contusion of rib on left side, sequela  -     lidocaine (LMX4) 4 % CREA cream; Apply topically once as needed for mild pain                Follow-ups after your visit        Follow-up notes from your care team     Return in about 6 weeks (around 5/28/2018) for BP Recheck.      Your next 10 appointments already scheduled     May 29, 2018  1:30 PM CDT   (Arrive by 1:15 PM)   Return Visit with Azeb Lewis MD   [facility] ([facility])    [address] [place] [id number] [phone]            Jul 11, 2018 10:30 AM CDT   Colonoscopy with Francisco Grigsby MD   [facility]  [place] [facility] ([facility])    [address]  [address]  [facility] [address]   [phone]            Feb 18, 2019  8:40 AM CST   (Arrive by 8:25 AM)   RETURN INFLAMMATORY BOWEL DISEASE with Francisco Grigsby MD   [facility] ([facility])    [address]  4th Floor  [facility] [id number] [phone]              Future tests that were ordered for you today     Open Future Orders        Priority Expected Expires Ordered    UA with Micro (No Culture) Routine 4/16/2018 4/16/2019 4/16/2018            Who to contact     Please call your clinic at [phone] to:    Ask questions about your health    Make or cancel appointments    Discuss your medicines    Learn about your test results    Speak to your doctor            Additional Information About Your Visit        [employer] ([employer] Information     [employer] gives you secure access to your electronic health record. If you see a primary care provider, you can also send messages to your care team and make appointments. If you have questions, please call your primary care clinic.  If you do not have a primary care provider, please call [phone] and they will assist you.      Bex is an electronic gateway that provides easy, online access to your medical records. With [employer], you can request a clinic appointment, read your test results, renew a prescription or communicate with your care team.     To access your existing account, please contact your [facility] or call [phone] for assistance.        Care EveryWhere ID     This is your Care EveryWhere ID. This could be used by other organizations to access your [place] medical records  [id number]        Your Vitals Were     Pulse Pulse Oximetry BMI (Body Mass Index)             92 96% 32.25 kg/m2          Blood Pressure from Last 3 Encounters:   04/16/18 (!) 158/99   02/26/18 (!) 153/96    08/23/17 (!) 162/108    Weight from Last 3 Encounters:   04/16/18 99.1 kg (218 lb 6.4 oz)   02/26/18 98.5 kg (217 lb 1.6 oz)   08/23/17 93 kg (205 lb)                 Today's Medication Changes          These changes are accurate as of 4/16/18 12:38 PM.  If you have any questions, ask your nurse or doctor.               Start taking these medicines.        Dose/Directions    lidocaine 4 % Crea cream   Commonly known as:  LMX4   Used for:  Contusion of rib on left side, sequela   Started by:  Azeb Lewis MD        Apply 3 times daily as needed for pain   Quantity:  1 Tube   Refills:  1       losartan 50 MG tablet   Commonly known as:  COZAAR   Used for:  Benign essential hypertension   Started by:  Azeb Lewis MD        Dose:  50 mg   Take 1 tablet (50 mg) by mouth daily   Quantity:  30 tablet   Refills:  3            Where to get your medicines      These medications were sent to [facility] [address] [address]     Phone:  [phone]     lidocaine 4 % Crea cream    losartan 50 MG tablet                Primary Care Provider    None Specified       No primary provider on file.        Equal Access to Services     MARY JANE LUIS : Bennett Garcia, walizabethda silvio, qanancita kaalmada [place], tegan london. So [facility] [phone].    ATENCIÓN: Si habla español, tiene a boyd disposición servicios gratuitos de asistencia lingüística. [facility] [phone].    We comply with applicable federal civil rights laws and [place] laws. We do not discriminate on the basis of race, color, national origin, age, disability, sex, sexual orientation, or gender identity.            Thank you!     Thank you for choosing [facility]  for your care. Our goal is always to provide you with excellent care. Hearing back from our patients is one way we can continue to improve our services. Please take a few  minutes to complete the written survey that you may receive in the mail after your visit with us. Thank you!             Your Updated Medication List - Protect others around you: Learn how to safely use, store and throw away your medicines at www.disposemymeds.org.          This list is accurate as of 4/16/18 12:38 PM.  Always use your most recent med list.                   Brand Name Dispense Instructions for use Diagnosis    baclofen 10 MG tablet    LIORESAL    90 tablet    Take 1 tablet (10 mg) by mouth 2 times daily    Chronic low back pain, unspecified back pain laterality, with sciatica presence unspecified       escitalopram 20 MG tablet    LEXAPRO    90 tablet    Take 1 tablet (20 mg) by mouth daily    Major depressive disorder, single episode, moderate (H)       FOLIC ACID PO      Take 400 mcg by mouth daily        gabapentin 300 MG capsule    NEURONTIN    30 capsule    Take 1 capsule (300 mg) by mouth At Bedtime    Chronic low back pain, unspecified back pain laterality, with sciatica presence unspecified       lidocaine 4 % Crea cream    LMX4    1 Tube    Apply 3 times daily as needed for pain    Contusion of rib on left side, sequela       losartan 50 MG tablet    COZAAR    30 tablet    Take 1 tablet (50 mg) by mouth daily    Benign essential hypertension       mesalamine 1.2 g EC tablet    LIALDA    180 tablet    Take 2 tablets (2,400 mg) by mouth every morning    Ulcerative pancolitis without complication (H)       * oxyCODONE-acetaminophen  MG per tablet    PERCOCET     Take 1 tablet by mouth 2 times daily    Ulcerative pancolitis without complication (H)       * oxyCODONE-acetaminophen 5-325 MG per tablet    PERCOCET    120 tablet    Take 1 tablet by mouth every 6 hours as needed for moderate to severe pain    Chronic low back pain, unspecified back pain laterality, with sciatica presence unspecified, Chronic neck pain, Chronic narcotic use       sulfaSALAzine 500 MG tablet    AZULFIDINE     "180 tablet    Take 2 tablets (1,000 mg) by mouth 2 times daily    Ulcerative pancolitis without complication (H)       TYLENOL PO      Take 1 tablet by mouth every 4 hours as needed for mild pain or fever '\" I take 2500 to 3500 mg of tylenol daily.\"        * Notice:  This list has 2 medication(s) that are the same as other medications prescribed for you. Read the directions carefully, and ask your doctor or other care provider to review them with you.      "

## 2018-04-17 ENCOUNTER — TELEPHONE (OUTPATIENT)
Dept: INTERNAL MEDICINE | Facility: CLINIC | Age: 53
End: 2018-04-17

## 2018-04-23 NOTE — TELEPHONE ENCOUNTER
Please let him know that there is 4% lidocaine available over-the-counter as salon Pas.   I believe it is a gel, but it should be effective.  Please ask him to try.  Thank you.     Pt called and information let.  Alicia Paniagua RN 1:09 PM on 4/23/2018.

## 2018-06-06 ENCOUNTER — OFFICE VISIT (OUTPATIENT)
Dept: INTERNAL MEDICINE | Facility: CLINIC | Age: 53
End: 2018-06-06
Payer: COMMERCIAL

## 2018-06-06 VITALS
HEART RATE: 72 BPM | BODY MASS INDEX: 32.27 KG/M2 | WEIGHT: 218.5 LBS | SYSTOLIC BLOOD PRESSURE: 174 MMHG | DIASTOLIC BLOOD PRESSURE: 100 MMHG

## 2018-06-06 DIAGNOSIS — I10 BENIGN ESSENTIAL HYPERTENSION: ICD-10-CM

## 2018-06-06 DIAGNOSIS — Z11.59 NEED FOR HEPATITIS C SCREENING TEST: ICD-10-CM

## 2018-06-06 DIAGNOSIS — Z23 NEED FOR PROPHYLACTIC VACCINATION WITH TETANUS-DIPHTHERIA (TD): ICD-10-CM

## 2018-06-06 DIAGNOSIS — G43.009 MIGRAINE WITHOUT AURA AND WITHOUT STATUS MIGRAINOSUS, NOT INTRACTABLE: Primary | ICD-10-CM

## 2018-06-06 DIAGNOSIS — S06.9X0S TRAUMATIC BRAIN INJURY, WITHOUT LOSS OF CONSCIOUSNESS, SEQUELA (H): ICD-10-CM

## 2018-06-06 DIAGNOSIS — D22.9 MELANOCYTIC NEVUS, UNSPECIFIED LOCATION: ICD-10-CM

## 2018-06-06 DIAGNOSIS — I10 ESSENTIAL HYPERTENSION: ICD-10-CM

## 2018-06-06 LAB
ANION GAP SERPL CALCULATED.3IONS-SCNC: 6 MMOL/L (ref 3–14)
BUN SERPL-MCNC: 12 MG/DL (ref 7–30)
CALCIUM SERPL-MCNC: 9.3 MG/DL (ref 8.5–10.1)
CHLORIDE SERPL-SCNC: 103 MMOL/L (ref 94–109)
CHOLEST SERPL-MCNC: 175 MG/DL
CO2 SERPL-SCNC: 30 MMOL/L (ref 20–32)
CREAT SERPL-MCNC: 1.07 MG/DL (ref 0.66–1.25)
GFR SERPL CREATININE-BSD FRML MDRD: 72 ML/MIN/1.7M2
GLUCOSE SERPL-MCNC: 111 MG/DL (ref 70–99)
HDLC SERPL-MCNC: 49 MG/DL
LDLC SERPL CALC-MCNC: 107 MG/DL
NONHDLC SERPL-MCNC: 126 MG/DL
POTASSIUM SERPL-SCNC: 4.1 MMOL/L (ref 3.4–5.3)
SODIUM SERPL-SCNC: 140 MMOL/L (ref 133–144)
TRIGL SERPL-MCNC: 95 MG/DL

## 2018-06-06 RX ORDER — LOSARTAN POTASSIUM 100 MG/1
100 TABLET ORAL DAILY
Qty: 90 TABLET | Refills: 1 | Status: ON HOLD | OUTPATIENT
Start: 2018-06-06 | End: 2019-03-12

## 2018-06-06 RX ORDER — ATENOLOL 25 MG/1
25 TABLET ORAL DAILY
Qty: 90 TABLET | Refills: 1 | Status: SHIPPED | OUTPATIENT
Start: 2018-06-06 | End: 2019-06-25

## 2018-06-06 RX ORDER — ACETAMINOPHEN 500 MG
500-1000 TABLET ORAL EVERY 6 HOURS PRN
Qty: 1 BOTTLE | Refills: 3 | Status: SHIPPED | OUTPATIENT
Start: 2018-06-06 | End: 2018-11-27

## 2018-06-06 RX ORDER — NALOXONE HYDROCHLORIDE 4 MG/.1ML
SPRAY NASAL
COMMUNITY
Start: 2018-06-05 | End: 2023-08-21

## 2018-06-06 NOTE — PROGRESS NOTES
Rooming Note  Health Maintenance   Health Maintenance Due   Topic Date Due     TETANUS IMMUNIZATION (SYSTEM ASSIGNED)  04/06/1982     HIV SCREEN (SYSTEM ASSIGNED)  04/06/1982     HEPATITIS C SCREENING  04/06/1982     LIPID SCREEN Q5 YR MALE (SYSTEM ASSIGNED)  04/06/1999    All health maintenance items discussed and pended.  Blood Pressure   BP Readings from Last 1 Encounters:   06/06/18 (!) 164/99    Single BP recheck started, 1:53 PM (4 minutes)

## 2018-06-06 NOTE — MR AVS SNAPSHOT
After Visit Summary   6/6/2018    Parrish Muir    MRN: [id number]           Patient Information     Date Of Birth          1965        Visit Information        Provider Department      6/6/2018 1:40 PM Seb Lafleur MD [facility]        Today's Diagnoses     Migraine without aura and without status migrainosus, not intractable    -  1    Need for hepatitis C screening test        Need for prophylactic vaccination with tetanus-diphtheria (TD)        Benign essential hypertension        Essential hypertension        Traumatic brain injury, without loss of consciousness, sequela (H)        Melanocytic nevus, unspecified location          Care Instructions    Primary Care Center: [phone]     [facility] Medication Refill Request Information:  * Please contact your pharmacy regarding ANY request for medication refills.  ** [facility] Prescription Fax = [phone]  * Please allow 3 business days for routine medication refills.  * Please allow 5 business days for controlled substance medication refills.     Primary Care Center Test Result notification information:  *You will be notified with in 7-10 days of your appointment day regarding the results of your test.  If you are on MyChart you will be notified as soon as the provider has reviewed the results and signed off on them.            Follow-ups after your visit        Additional Services     DERMATOLOGY REFERRAL       Your provider has referred you to: [facility]: Dermatology Clinic [facility] [phone]   [email or web address]    Please be aware that coverage of these services is subject to the terms and limitations of your health insurance plan.  Call member services at your health plan with any benefit or coverage questions.      Please bring the following to your appointment:  Any x-rays, CTs or MRIs which have been performed.  Contact the facility where they were done to  arrange for  prior to your scheduled appointment.  Any new CT, MRI or other procedures ordered by your specialist must be performed at a [facility] facility or coordinated by your clinic's referral office.    List of current medications   This referral request   Any documents/labs given to you for this referral            MENTAL HEALTH REFERRAL  - Adult; Assessments and Testing; Neuropsychological Testing; Other: Not Listed - Enter Referral Details in Scheduling Comments Below       Your provider has referred for consult to: Dr. Dillon, [facility]    All scheduling is subject to the client's specific insurance plan & benefits, provider/location availability, and provider clinical specialities.  Please arrive 15 minutes early for your appointment and bring your completed paperwork.    Please be aware that coverage of these services is subject to the terms and limitations of your health insurance plan.  Call member services at your health plan with any benefit or coverage questions.                  Follow-up notes from your care team     Return in about 4 weeks (around 7/4/2018) for BP Recheck.      Your next 10 appointments already scheduled     Jun 27, 2018 11:00 AM CDT   (Arrive by 10:45 AM)   ACUTE/CHRONIC SINGLE CONDITION with Seb Lafleur MD   [facility] ([facility])    [address] [place] [address]  [address] [facility] [address]   [phone]            Jul 11, 2018 10:30 AM CDT   Colonoscopy with Francisco Grigsby MD   [facility] ([facility])    [address] [place] [address] [address]  [facility] [address]   [phone]            Aug 03, 2018 10:00 AM CDT   (Arrive by 9:45 AM)   New Patient Visit with Fady Hester MD   [facility] ([facility] [place])    [address] [place] [address]  [address] [facility] [address]   [phone]            Feb 18, 2019  8:40 AM CST   (Arrive by 8:25 AM)    RETURN INFLAMMATORY BOWEL DISEASE with Francisco Grigsby MD   [facility] ([facility] [place])    [address]  4th Floor  [facility] [address] [phone]              Future tests that were ordered for you today     Open Future Orders        Priority Expected Expires Ordered    Hepatitis C Screen Reflex to HCV RNA Quant and Genotype Routine 6/6/2018 6/6/2019 6/6/2018    Lipid panel reflex to direct LDL Fasting Routine 6/6/2018 6/6/2019 6/6/2018            Who to contact     Please call your clinic at [phone] to:    Ask questions about your health    Make or cancel appointments    Discuss your medicines    Learn about your test results    Speak to your doctor            Additional Information About Your Visit        Enevate Information     [employer] gives you secure access to your electronic health record. If you see a primary care provider, you can also send messages to your care team and make appointments. If you have questions, please call your primary care clinic.  If you do not have a primary care provider, please call [phone] and they will assist you.      Enevate is an electronic gateway that provides easy, online access to your medical records. With Enevate, you can request a clinic appointment, read your test results, renew a prescription or communicate with your care team.     To access your existing account, please contact your [facility] or call [phone] for assistance.        Care EveryWhere ID     This is your Care EveryWhere ID. This could be used by other organizations to access your [place] medical records  [id number]        Your Vitals Were     Pulse BMI (Body Mass Index)                72 32.27 kg/m2           Blood Pressure from Last 3 Encounters:   06/06/18 (!) 174/100   04/16/18 (!) 158/99   02/26/18 (!) 153/96    Weight from Last 3 Encounters:   06/06/18 99.1 kg (218 lb 8 oz)   04/16/18  "99.1 kg (218 lb 6.4 oz)   02/26/18 98.5 kg (217 lb 1.6 oz)              We Performed the Following     Basic metabolic panel     DERMATOLOGY REFERRAL     MENTAL HEALTH REFERRAL  - Adult; Assessments and Testing; Neuropsychological Testing; Other: Not Listed - Enter Referral Details in Scheduling Comments Below     TDAP ( BOOST[employer] AGES 10-64)          Today's Medication Changes          These changes are accurate as of 6/6/18  3:07 PM.  If you have any questions, ask your nurse or doctor.               Start taking these medicines.        Dose/Directions    atenolol 25 MG tablet   Commonly known as:  TENORMIN   Used for:  Benign essential hypertension   Started by:  Seb Lafleur MD        Dose:  25 mg   Take 1 tablet (25 mg) by mouth daily   Quantity:  90 tablet   Refills:  1         These medicines have changed or have updated prescriptions.        Dose/Directions    losartan 100 MG tablet   Commonly known as:  COZAAR   This may have changed:    - medication strength  - how much to take   Used for:  Benign essential hypertension   Changed by:  Seb Lafleur MD        Dose:  100 mg   Take 1 tablet (100 mg) by mouth daily   Quantity:  90 tablet   Refills:  1       * TYLENOL PO   This may have changed:  Another medication with the same name was added. Make sure you understand how and when to take each.   Changed by:  Seb Lafleur MD        Dose:  1 tablet   Take 1 tablet by mouth every 4 hours as needed for mild pain or fever '\" I take 2500 to 3500 mg of tylenol daily.\"   Refills:  0       * acetaminophen 500 MG tablet   Commonly known as:  TYLENOL   This may have changed:  You were already taking a medication with the same name, and this prescription was added. Make sure you understand how and when to take each.   Used for:  Migraine without aura and without status migrainosus, not intractable   Changed by:  Seb Lafleur MD        Dose:  500-1000 mg   Take 1-2 tablets (500-1,000 mg) by mouth every 6 " hours as needed for mild pain   Quantity:  1 Bottle   Refills:  3       * Notice:  This list has 2 medication(s) that are the same as other medications prescribed for you. Read the directions carefully, and ask your doctor or other care provider to review them with you.         Where to get your medicines      These medications were sent to [id number] - [place], [place] - [address] [place] [address]  [address] [place] [address]     Phone:  [phone]     acetaminophen 500 MG tablet    atenolol 25 MG tablet    losartan 100 MG tablet                Primary Care Provider Office Phone # Fax #    Seb Lafleur [phone]       [address] [facility] [address]        Equal Access to Services     MARY JANE LUIS : Bennett Garcia, walizabethda silvio, qaybta kaalmada [place], tegan pérez . So [facility] [phone].    ATENCIÓN: Si habla español, tiene a boyd disposición servicios gratuitos de asistencia lingüística. Llame al [phone].    We comply with applicable federal civil rights laws and [place] laws. We do not discriminate on the basis of race, color, national origin, age, disability, sex, sexual orientation, or gender identity.            Thank you!     Thank you for choosing [facility]  for your care. Our goal is always to provide you with excellent care. Hearing back from our patients is one way we can continue to improve our services. Please take a few minutes to complete the written survey that you may receive in the mail after your visit with us. Thank you!             Your Updated Medication List - Protect others around you: Learn how to safely use, store and throw away your medicines at www.disposemymeds.org.          This list is accurate as of 6/6/18  3:07 PM.  Always use your most recent med list.                   Brand Name Dispense Instructions for use Diagnosis    atenolol 25 MG tablet    TENORMIN    90 tablet     Take 1 tablet (25 mg) by mouth daily    Benign essential hypertension       baclofen 10 MG tablet    LIORESAL    90 tablet    Take 1 tablet (10 mg) by mouth 2 times daily    Chronic low back pain, unspecified back pain laterality, with sciatica presence unspecified       cholecalciferol 5000 units Caps capsule    vitamin D3     TAKE ONE CAPSULE BY MOUTH ONCE DAILY        escitalopram 20 MG tablet    LEXAPRO    90 tablet    Take 1 tablet (20 mg) by mouth daily    Major depressive disorder, single episode, moderate (H)       FOLIC ACID PO      Take 400 mcg by mouth daily        gabapentin 300 MG capsule    NEURONTIN    30 capsule    Take 1 capsule (300 mg) by mouth At Bedtime    Chronic low back pain, unspecified back pain laterality, with sciatica presence unspecified       lidocaine 4 % Crea cream    LMX4    1 Tube    Apply 3 times daily as needed for pain    Contusion of rib on left side, sequela       losartan 100 MG tablet    COZAAR    90 tablet    Take 1 tablet (100 mg) by mouth daily    Benign essential hypertension       mesalamine 1.2 g EC tablet    LIALDA    180 tablet    Take 2 tablets (2,400 mg) by mouth every morning    Ulcerative pancolitis without complication (H)       NARCAN nasal spray   Generic drug:  naloxone           * oxyCODONE-acetaminophen  MG per tablet    PERCOCET     Take 1 tablet by mouth 2 times daily    Ulcerative pancolitis without complication (H)       * oxyCODONE-acetaminophen 5-325 MG per tablet    PERCOCET    120 tablet    Take 1 tablet by mouth every 6 hours as needed for moderate to severe pain    Chronic low back pain, unspecified back pain laterality, with sciatica presence unspecified, Chronic neck pain, Chronic narcotic use       sulfaSALAzine 500 MG tablet    AZULFIDINE    180 tablet    Take 2 tablets (1,000 mg) by mouth 2 times daily    Ulcerative pancolitis without complication (H)       * TYLENOL PO      Take 1 tablet by mouth every 4 hours as needed for mild pain  "or fever '\" I take 2500 to 3500 mg of tylenol daily.\"        * acetaminophen 500 MG tablet    TYLENOL    1 Bottle    Take 1-2 tablets (500-1,000 mg) by mouth every 6 hours as needed for mild pain    Migraine without aura and without status migrainosus, not intractable       * Notice:  This list has 4 medication(s) that are the same as other medications prescribed for you. Read the directions carefully, and ask your doctor or other care provider to review them with you.      "

## 2018-06-06 NOTE — LETTER
Parrish Muir  [address] [place] [address]  : 1965  MRN:  [id number]      2018          To Whom it May Concern,    Parrish Muir was seen by me today for his medical condition.  Please call for any questions.     Sincerely,        Seb Lafleur MD, A
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
## 2018-06-06 NOTE — NURSING NOTE
Chief Complaint   Patient presents with     Hypertension     pt here to discuss high blood pressure     Recheck Medication     pt would like to discuss medications     Fall     pt states he fell on March 30 and thinks he broke some ribs       Tessa Beaver CMA at 1:47 PM on 6/6/2018.

## 2018-06-07 LAB — HCV AB SERPL QL IA: NONREACTIVE

## 2018-06-28 ENCOUNTER — TELEPHONE (OUTPATIENT)
Dept: GASTROENTEROLOGY | Facility: OUTPATIENT CENTER | Age: 53
End: 2018-06-28

## 2018-07-09 ENCOUNTER — TELEPHONE (OUTPATIENT)
Dept: GASTROENTEROLOGY | Facility: CLINIC | Age: 53
End: 2018-07-09

## 2018-07-24 ENCOUNTER — TELEPHONE (OUTPATIENT)
Dept: INTERNAL MEDICINE | Facility: CLINIC | Age: 53
End: 2018-07-24

## 2018-07-24 NOTE — TELEPHONE ENCOUNTER
Patient called back regarding recent blood pressure readings have been consistent around about 130/69, however he misplaced his actual list of readings.  Patient says he is taking his atenolol and Cozaar as directed and he feels much better.  He says his heart feels better and he would like to cancel tomorrow's appointment with Dr. Lafleur.  He says he lives in [place] and does not feel that this it  is necessary. If Dr. Culver has any concerns he may be contacted to schedule another visit.  Ney Rodas LPN at 3:48 PM on 7/24/2018

## 2018-07-24 NOTE — TELEPHONE ENCOUNTER
[facility] Call Center    Phone Message    May a detailed message be left on voicemail: yes    Reason for Call: Other: Pt wanted to let Dr. Lafleur know his BP has been pretty consistent at 130/69 and doesn't feel the need to keep his appointment tomorow. If you have and quesitons/concerns please give the pt a call back.      Action Taken: Message routed to:  [facility] ([facility] YASIR

## 2018-07-24 NOTE — TELEPHONE ENCOUNTER
Message left to patient's home voicemail to call clinic back.  Note: patient has already cancel his appointment tomorrow Dr. Lafleur. Ney Rodas LPN at 3:25 PM on 7/24/2018

## 2018-07-25 ENCOUNTER — TELEPHONE (OUTPATIENT)
Dept: INTERNAL MEDICINE | Facility: CLINIC | Age: 53
End: 2018-07-25

## 2018-07-25 NOTE — TELEPHONE ENCOUNTER
That is fine, please have patient continue to check blood pressures at home once weekly, follow up with me in 3 months.  Zay    Pt called and message left with pt continuing his blood pressures daily.  Alicia Paniagua RN 3:12 PM on 7/25/2018.

## 2018-08-03 ENCOUNTER — OFFICE VISIT (OUTPATIENT)
Dept: DERMATOLOGY | Facility: CLINIC | Age: 53
End: 2018-08-03
Payer: COMMERCIAL

## 2018-08-03 DIAGNOSIS — L70.0 OPEN COMEDONE: ICD-10-CM

## 2018-08-03 DIAGNOSIS — L98.9 SKIN LESION: Primary | ICD-10-CM

## 2018-08-03 RX ORDER — BENZOYL PEROXIDE 10 G/100G
SUSPENSION TOPICAL
Qty: 227 G | Refills: 3 | Status: SHIPPED | OUTPATIENT
Start: 2018-08-03

## 2018-08-03 ASSESSMENT — PAIN SCALES - GENERAL
PAINLEVEL: MILD PAIN (2)
PAINLEVEL: NO PAIN (0)

## 2018-08-03 NOTE — NURSING NOTE
Dermatology Rooming Note    Parrish Muir's goals for this visit include:   Chief Complaint   Patient presents with     Derm Problem     Parrish is here regarding a spot on his back. He states that they are possibly changing moles. He states htat he also has a concern in his armpit and two spots on his chest.     Keiry Crow LPN

## 2018-08-03 NOTE — PATIENT INSTRUCTIONS

## 2018-08-03 NOTE — LETTER
8/3/2018       RE: Parrish Muir  [address] [place] [address]     Dear Colleague,    Thank you for referring your patient, Parrish Muir, to the [facility] DERMATOLOGY at [facility]. Please see a copy of my visit note below.    [facility] Dermatology Note      Dermatology Problem List:  1. Two open comedones of the upper back that become inflamed  2. PIH of the chest related to manipulation of comedones  3. Right axilla nodule that drains white matter intermittently    Encounter Date: Aug 3, 2018    CC:   Chief Complaint   Patient presents with     Derm Problem     Parrish is here regarding a spot on his back. He states that they are possibly changing moles. He states htat he also has a concern in his armpit and two spots on his chest.         History of Present Illness:  Mr. Parrish Muir is a 53 year old male with IBD without a history of skin cancer who presents for evaluation of multiple skin complaints. Of the upper back, he has black heads that are more than ten years old. The primary symptom is pruritus. He would occasionally attempt to drain them with the help of his girlfriend. He requests removal today as he says that they are itchy. On the chest, he reports a dark spot that is about 1-2 years old that he thinks is a prior comedone that he popped. On the right axilla, he reports a small nodule that is 3-4 years old. It has stayed the same size. He thinks it may be a lymph node. He says he will occassionally pinch it and drain white matter.The last time it drained anything was about one month ago.    The patient denies any constitutional symptoms, lymphadenopathy, unintentional weight loss or decreased appetite.    He has had prior skin biopsies at [facility] but none have ever returned as skin cancer. He thinks these were all acrochordons.    Past Medical History:   Patient Active Problem List   Diagnosis      "Inflammatory bowel disease (ulcerative colitis) (H)     HTN (hypertension)     Moderate major depression (H)     History of colonic polyps     Benign neoplasm of colon     Anxiety     History reviewed. No pertinent past medical history.  Past Surgical History:   Procedure Laterality Date     COLONOSCOPY WITH CO2 INSUFFLATION N/A 11/6/2015    Procedure: COLONOSCOPY WITH CO2 INSUFFLATION;  Surgeon: Francisco Grigsby MD;  Location:  OR       Social History:  The patient is disabled. The patient denies use of tanning beds.    Family History:  Non-contributory    Medications:  Current Outpatient Prescriptions   Medication Sig Dispense Refill     Acetaminophen (TYLENOL PO) Take 1 tablet by mouth every 4 hours as needed for mild pain or fever '\" I take 2500 to 3500 mg of tylenol daily.\"       acetaminophen (TYLENOL) 500 MG tablet Take 1-2 tablets (500-1,000 mg) by mouth every 6 hours as needed for mild pain 1 Bottle 3     atenolol (TENORMIN) 25 MG tablet Take 1 tablet (25 mg) by mouth daily 90 tablet 1     baclofen (LIORESAL) 10 MG tablet Take 1 tablet (10 mg) by mouth 2 times daily 90 tablet 1     cholecalciferol (VITAMIN D3) 5000 units CAPS capsule TAKE ONE CAPSULE BY MOUTH ONCE DAILY  3     escitalopram (LEXAPRO) 20 MG tablet Take 1 tablet (20 mg) by mouth daily 90 tablet 1     FOLIC ACID PO Take 400 mcg by mouth daily       gabapentin (NEURONTIN) 300 MG capsule Take 1 capsule (300 mg) by mouth At Bedtime 30 capsule 1     lidocaine (LMX4) 4 % CREA cream Apply 3 times daily as needed for pain 1 Tube 1     losartan (COZAAR) 100 MG tablet Take 1 tablet (100 mg) by mouth daily 90 tablet 1     mesalamine (LIALDA) 1.2 G EC tablet Take 2 tablets (2,400 mg) by mouth every morning 180 tablet 3     NARCAN nasal spray        oxyCODONE-acetaminophen (PERCOCET)  MG per tablet Take 1 tablet by mouth 2 times daily       oxyCODONE-acetaminophen (PERCOCET) 5-325 MG per tablet Take 1 tablet by mouth every 6 hours as needed for " moderate to severe pain 120 tablet 0     sulfaSALAzine (AZULFIDINE) 500 MG tablet Take 2 tablets (1,000 mg) by mouth 2 times daily 180 tablet 0        Allergies   Allergen Reactions     Morphine Sulfate          Review of Systems:  -Skin Establ Pt: The patient denies any new rash, pruritus, or lesions that are symptomatic, changing or bleeding, except as per HPI.  -Constitutional: The patient denies fatigue, fevers, chills, unintended weight loss, and night sweats.  -HEENT: Patient denies nonhealing oral sores.  -Skin: As above in HPI. No additional skin concerns.    Physical exam:  Vitals: There were no vitals taken for this visit.  GEN: This is a well developed, well-nourished male in no acute distress, in a pleasant mood.    SKIN: Total skin excluding the undergarment areas was performed. The exam included the head/face, neck, both arms, chest, back, abdomen, both legs, digits and/or nails.   -Of the upper back, there are several open comedones, with two larger open comedones that appear inflamed and measuring 1 x 1 mm that on dermatoscopy has a central punctum with black matter  -On the mid-chest, there is post-inflammatory hyperpigmentation and mild scarring associated at sites of prior comedones  -Of the right axilla, there is a 2 x 2 mm mobile nodule without a punctum that is not tender to palpation or have overlying skin changes  -No other lesions of concern on areas examined.     Impression/Plan:  1. Open comedone, upper back, left    Because these lesions are causing the patient significant irritation and pruritus over the last several years, biopsy is reasonable.  Clinically, they are consistent with open comedone with oxidized matter of the pores.    Punch biopsy:  After discussion of benefits and risks including but not limited to bleeding/bruising, pain/swelling, infection, scar, incomplete removal, nerve damage/numbness, recurrence, and non-diagnostic biopsy, written consent, verbal consent and  photographs were obtained. Time-out was performed. The area was cleaned with isopropyl alcohol. 0.5mL of 1% lidocaine was injected to obtain adequate anesthesia of the lesion on the lesion of the left upper back. A 2 mm punch biopsy was performed.  4-0 ethilon sutures were utilized to approximate the epidermal edges.  White petroleum jelly/VaselineTM and a bandage was applied to the wound.  Explicit verbal and written wound care instructions were provided.  The patient left the Dermatology Clinic in good condition. The patient was counseled to follow up for suture removal in approximately 10-14 days.    Ordered BPO 10% solution applied with showers qdaily or every other day and warned about bleaching of towels    2. Open comedone, upper back, right    Because these lesions are causing the patient significant irritation and pruritus over the last several years, biopsy is reasonable.  Clinically, they are consistent with open comedone with oxidized matter of the pores.    Punch biopsy:  After discussion of benefits and risks including but not limited to bleeding/bruising, pain/swelling, infection, scar, incomplete removal, nerve damage/numbness, recurrence, and non-diagnostic biopsy, written consent, verbal consent and photographs were obtained. Time-out was performed. The area was cleaned with isopropyl alcohol. 0.5mL of 1% lidocaine was injected to obtain adequate anesthesia of the lesion on the lesion of the left upper back. A 3 mm punch biopsy was performed.  4-0 ethilon sutures were utilized to approximate the epidermal edges.  White petroleum jelly/VaselineTM and a bandage was applied to the wound.  Explicit verbal and written wound care instructions were provided.  The patient left the Dermatology Clinic in good condition. The patient was counseled to follow up for suture removal in approximately 10-14 days.    CC Dr. Lafleur on close of this encounter.  Follow-up PRN for new or changing lesions. Return in 10-14  days for suture removal.      Dr. Hester staffed the patient.    Staff Involved:  Resident(Eric Guevara)/Staff(as above)    Staff Physician Comments:  I saw and evaluated the patient with the resident and I agree with the assessment and plan as documented in the resident's note. I was present for the key portions of the procedure.    Fady Hester MD  Professor  Head of Dermato-Allergy Division  Department of Dermatology  [facility]

## 2018-08-03 NOTE — PROGRESS NOTES
[facility] Dermatology Note      Dermatology Problem List:  1. Two open comedones of the upper back that become inflamed  2. PIH of the chest related to manipulation of comedones  3. Right axilla nodule that drains white matter intermittently    Encounter Date: Aug 3, 2018    CC:   Chief Complaint   Patient presents with     Derm Problem     Parrish is here regarding a spot on his back. He states that they are possibly changing moles. He states htat he also has a concern in his armpit and two spots on his chest.         History of Present Illness:  Mr. Parrish Muir is a 53 year old male with IBD without a history of skin cancer who presents for evaluation of multiple skin complaints. Of the upper back, he has black heads that are more than ten years old. The primary symptom is pruritus. He would occasionally attempt to drain them with the help of his girlfriend. He requests removal today as he says that they are itchy. On the chest, he reports a dark spot that is about 1-2 years old that he thinks is a prior comedone that he popped. On the right axilla, he reports a small nodule that is 3-4 years old. It has stayed the same size. He thinks it may be a lymph node. He says he will occassionally pinch it and drain white matter.The last time it drained anything was about one month ago.    The patient denies any constitutional symptoms, lymphadenopathy, unintentional weight loss or decreased appetite.    He has had prior skin biopsies at [facility] but none have ever returned as skin cancer. He thinks these were all acrochordons.    Past Medical History:   Patient Active Problem List   Diagnosis     Inflammatory bowel disease (ulcerative colitis) (H)     HTN (hypertension)     Moderate major depression (H)     History of colonic polyps     Benign neoplasm of colon     Anxiety     History reviewed. No pertinent past medical history.  Past Surgical History:   Procedure Laterality Date      "COLONOSCOPY WITH CO2 INSUFFLATION N/A 11/6/2015    Procedure: COLONOSCOPY WITH CO2 INSUFFLATION;  Surgeon: Francisco Grigsby MD;  Location:  OR       Social History:  The patient is disabled. The patient denies use of tanning beds.    Family History:  Non-contributory    Medications:  Current Outpatient Prescriptions   Medication Sig Dispense Refill     Acetaminophen (TYLENOL PO) Take 1 tablet by mouth every 4 hours as needed for mild pain or fever '\" I take 2500 to 3500 mg of tylenol daily.\"       acetaminophen (TYLENOL) 500 MG tablet Take 1-2 tablets (500-1,000 mg) by mouth every 6 hours as needed for mild pain 1 Bottle 3     atenolol (TENORMIN) 25 MG tablet Take 1 tablet (25 mg) by mouth daily 90 tablet 1     baclofen (LIORESAL) 10 MG tablet Take 1 tablet (10 mg) by mouth 2 times daily 90 tablet 1     cholecalciferol (VITAMIN D3) 5000 units CAPS capsule TAKE ONE CAPSULE BY MOUTH ONCE DAILY  3     escitalopram (LEXAPRO) 20 MG tablet Take 1 tablet (20 mg) by mouth daily 90 tablet 1     FOLIC ACID PO Take 400 mcg by mouth daily       gabapentin (NEURONTIN) 300 MG capsule Take 1 capsule (300 mg) by mouth At Bedtime 30 capsule 1     lidocaine (LMX4) 4 % CREA cream Apply 3 times daily as needed for pain 1 Tube 1     losartan (COZAAR) 100 MG tablet Take 1 tablet (100 mg) by mouth daily 90 tablet 1     mesalamine (LIALDA) 1.2 G EC tablet Take 2 tablets (2,400 mg) by mouth every morning 180 tablet 3     NARCAN nasal spray        oxyCODONE-acetaminophen (PERCOCET)  MG per tablet Take 1 tablet by mouth 2 times daily       oxyCODONE-acetaminophen (PERCOCET) 5-325 MG per tablet Take 1 tablet by mouth every 6 hours as needed for moderate to severe pain 120 tablet 0     sulfaSALAzine (AZULFIDINE) 500 MG tablet Take 2 tablets (1,000 mg) by mouth 2 times daily 180 tablet 0        Allergies   Allergen Reactions     Morphine Sulfate          Review of Systems:  -Skin Establ Pt: The patient denies any new rash, pruritus, or " lesions that are symptomatic, changing or bleeding, except as per HPI.  -Constitutional: The patient denies fatigue, fevers, chills, unintended weight loss, and night sweats.  -HEENT: Patient denies nonhealing oral sores.  -Skin: As above in HPI. No additional skin concerns.    Physical exam:  Vitals: There were no vitals taken for this visit.  GEN: This is a well developed, well-nourished male in no acute distress, in a pleasant mood.    SKIN: Total skin excluding the undergarment areas was performed. The exam included the head/face, neck, both arms, chest, back, abdomen, both legs, digits and/or nails.   -Of the upper back, there are several open comedones, with two larger open comedones that appear inflamed and measuring 1 x 1 mm that on dermatoscopy has a central punctum with black matter  -On the mid-chest, there is post-inflammatory hyperpigmentation and mild scarring associated at sites of prior comedones  -Of the right axilla, there is a 2 x 2 mm mobile nodule without a punctum that is not tender to palpation or have overlying skin changes  -No other lesions of concern on areas examined.     Impression/Plan:  1. Open comedone, upper back, left    Because these lesions are causing the patient significant irritation and pruritus over the last several years, biopsy is reasonable.  Clinically, they are consistent with open comedone with oxidized matter of the pores.    Punch biopsy:  After discussion of benefits and risks including but not limited to bleeding/bruising, pain/swelling, infection, scar, incomplete removal, nerve damage/numbness, recurrence, and non-diagnostic biopsy, written consent, verbal consent and photographs were obtained. Time-out was performed. The area was cleaned with isopropyl alcohol. 0.5mL of 1% lidocaine was injected to obtain adequate anesthesia of the lesion on the lesion of the left upper back. A 2 mm punch biopsy was performed.  4-0 ethilon sutures were utilized to approximate the  epidermal edges.  White petroleum jelly/VaselineTM and a bandage was applied to the wound.  Explicit verbal and written wound care instructions were provided.  The patient left the Dermatology Clinic in good condition. The patient was counseled to follow up for suture removal in approximately 10-14 days.    Ordered BPO 10% solution applied with showers qdaily or every other day and warned about bleaching of towels    2. Open comedone, upper back, right    Because these lesions are causing the patient significant irritation and pruritus over the last several years, biopsy is reasonable.  Clinically, they are consistent with open comedone with oxidized matter of the pores.    Punch biopsy:  After discussion of benefits and risks including but not limited to bleeding/bruising, pain/swelling, infection, scar, incomplete removal, nerve damage/numbness, recurrence, and non-diagnostic biopsy, written consent, verbal consent and photographs were obtained. Time-out was performed. The area was cleaned with isopropyl alcohol. 0.5mL of 1% lidocaine was injected to obtain adequate anesthesia of the lesion on the lesion of the left upper back. A 3 mm punch biopsy was performed.  4-0 ethilon sutures were utilized to approximate the epidermal edges.  White petroleum jelly/VaselineTM and a bandage was applied to the wound.  Explicit verbal and written wound care instructions were provided.  The patient left the Dermatology Clinic in good condition. The patient was counseled to follow up for suture removal in approximately 10-14 days.    CC Dr. Lafleur on close of this encounter.  Follow-up PRN for new or changing lesions. Return in 10-14 days for suture removal.      Dr. Hester staffed the patient.    Staff Involved:  Resident(Eric Guevara)/Staff(as above)    Staff Physician Comments:  I saw and evaluated the patient with the resident and I agree with the assessment and plan as documented in the resident's note. I was present for  the key portions of the procedure.    Fady Hester MD  Professor  Head of Dermato-Allergy Division  Department of Dermatology  [facility], [place], [facility]

## 2018-08-03 NOTE — MR AVS SNAPSHOT
After Visit Summary   8/3/2018    Parrish Muir    MRN: [id number]           Patient Information     Date Of Birth          1965        Visit Information        Provider Department      8/3/2018 10:00 AM Fady Hester MD [facility] Dermatology        Today's Diagnoses     Skin lesion    -  1      Care Instructions    Wound Care After a Biopsy    What is a skin biopsy?  A skin biopsy allows the doctor to examine a very small piece of tissue under the microscope to determine the diagnosis and the best treatment for the skin condition. A local anesthetic (numbing medicine)  is injected with a very small needle into the skin area to be tested. A small piece of skin is taken from the area. Sometimes a suture (stitch) is used.     What are the risks of a skin biopsy?  I will experience scar, bleeding, swelling, pain, crusting and redness. I may experience incomplete removal or recurrence. Risks of this procedure are excessive bleeding, bruising, infection, nerve damage, numbness, thick (hypertrophic or keloidal) scar and non-diagnostic biopsy.    How should I care for my wound for the first 24 hours?    Keep the wound dry and covered for 24 hours    If it bleeds, hold direct pressure on the area for 15 minutes. If bleeding does not stop then go to the emergency room    Avoid strenuous exercise the first 1-2 days or as your doctor instructs you    How should I care for the wound after 24 hours?    After 24 hours, remove the bandage    You may bathe or shower as normal    If you had a scalp biopsy, you can shampoo as usual and can use shower water to clean the biopsy site daily    Clean the wound twice a day with gentle soap and water    Do not scrub, be gentle    Apply white petroleum/Vaseline after cleaning the wound with a cotton swab or a clean finger, and keep the site covered with a Bandaid /bandage. Bandages are not necessary with a scalp biopsy    If you are unable to cover the site with a  Bandaid /bandage, re-apply ointment 2-3 times a day to keep the site moist. Moisture will help with healing    Avoid strenuous activity for first 1-2 days    Avoid lakes, rivers, pools, and oceans until the stitches are removed or the site is healed    How do I clean my wound?    Wash hands thoroughly with soap or use hand  before all wound care    Clean the wound with gentle soap and water    Apply white petroleum/Vaseline  to wound after it is clean    Replace the Bandaid /bandage to keep the wound covered for the first few days or as instructed by your doctor    If you had a scalp biopsy, warm shower water to the area on a daily basis should suffice    What should I use to clean my wound?     Cotton-tipped applicators (Qtips )    White petroleum jelly (Vaseline ). Use a clean new container and use Q-tips to apply.    Bandaids   as needed    Gentle soap     How should I care for my wound long term?    Do not get your wound dirty    Keep up with wound care for one week or until the area is healed.    A small scab will form and fall off by itself when the area is completely healed. The area will be red and will become pink in color as it heals. Sun protection is very important for how your scar will turn out. Sunscreen with an SPF 30 or greater is recommended once the area is healed.    If you have stitches, stitches need to be removed in 10-14 days. You may return to our clinic for this or you may have it done locally at your doctor s office.    You should have some soreness but it should be mild and slowly go away over several days. Talk to your doctor about using tylenol for pain,    When should I call my doctor?  If you have increased:     Pain or swelling    Pus or drainage (clear or slightly yellow drainage is ok)    Temperature over 100F    Spreading redness or warmth around wound    When will I hear about my results?  The biopsy results can take 2-3 weeks to come back. The clinic will call you with  the results, send you a Easel message, or have you schedule a follow-up clinic or phone time to discuss the results. Contact our clinics if you do not hear from us in 3 weeks.     Who should I call with questions?    [facility]: [phone]     [facility]: [phone]    For urgent needs outside of business hours call the [facility] at [phone] and ask for the dermatology resident on call              Follow-ups after your visit        Your next 10 appointments already scheduled     Aug 23, 2018 10:30 AM CDT   Colonoscopy with Francisco Grigsby MD   [facility] ([facility])    [address]  [place] [address] [phone]            Feb 18, 2019  8:40 AM CST   (Arrive by 8:25 AM)   RETURN INFLAMMATORY BOWEL DISEASE with Francisco Grigsby MD   [facility] ([facility])    [address]  [address] [facility] [address] [phone]              Who to contact     Please call your clinic at [phone] to:    Ask questions about your health    Make or cancel appointments    Discuss your medicines    Learn about your test results    Speak to your doctor            Additional Information About Your Visit        [employer] Information     [employer] gives you secure access to your electronic health record. If you see a primary care provider, you can also send messages to your care team and make appointments. If you have questions, please call your primary care clinic.  If you do not have a primary care provider, please call [phone] and they will assist you.      [employer] is an electronic gateway that provides easy, online access to your medical records. With [employer], you can request a clinic appointment, read your test results, renew a prescription or communicate with your care team.     To access your existing account, please  contact your [facility] or call [phone] for assistance.        Care EveryWhere ID     This is your Care EveryWhere ID. This could be used by other organizations to access your [facility] medical records  [id number]         Blood Pressure from Last 3 Encounters:   06/06/18 (!) 174/100   04/16/18 (!) 158/99   02/26/18 (!) 153/96    Weight from Last 3 Encounters:   06/06/18 99.1 kg (218 lb 8 oz)   04/16/18 99.1 kg (218 lb 6.4 oz)   02/26/18 98.5 kg (217 lb 1.6 oz)              We Performed the Following     BIOPSY SKIN/SUBQ/MUC MEM, EACH ADDTL LESION     BIOPSY SKIN/SUBQ/MUC MEM, SINGLE LESION     Dermatological path order and indications        Primary Care Provider Office Phone # Fax #    Seb Lafleur [phone] [address] [address]        Equal Access to Services     [facility] : Hadii aad ku hadasho Soomaali, waaxda luqadaha, qaybta kaalmada adeegyada, waxay idiin haybenitezn nicolas pérez . So [facility] [phone].    ATENCIÓN: Si habla español, tiene a boyd disposición servicios gratuitos de asistencia lingüística. Llame al [phone].    We comply with applicable federal civil rights laws and [place] laws. We do not discriminate on the basis of race, color, national origin, age, disability, sex, sexual orientation, or gender identity.            Thank you!     Thank you for choosing [facility] DERMATOLOGY  for your care. Our goal is always to provide you with excellent care. Hearing back from our patients is one way we can continue to improve our services. Please take a few minutes to complete the written survey that you may receive in the mail after your visit with us. Thank you!             Your Updated Medication List - Protect others around you: Learn how to safely use, store and throw away your medicines at www.disposemymeds.org.          This list is accurate as of 8/3/18 10:56 AM.  Always use your most recent med list.                    Brand Name Dispense Instructions for use Diagnosis    atenolol 25 MG tablet    TENORMIN    90 tablet    Take 1 tablet (25 mg) by mouth daily    Benign essential hypertension       baclofen 10 MG tablet    LIORESAL    90 tablet    Take 1 tablet (10 mg) by mouth 2 times daily    Chronic low back pain, unspecified back pain laterality, with sciatica presence unspecified       cholecalciferol 5000 units Caps capsule    vitamin D3     TAKE ONE CAPSULE BY MOUTH ONCE DAILY        escitalopram 20 MG tablet    LEXAPRO    90 tablet    Take 1 tablet (20 mg) by mouth daily    Major depressive disorder, single episode, moderate (H)       FOLIC ACID PO      Take 400 mcg by mouth daily        gabapentin 300 MG capsule    NEURONTIN    30 capsule    Take 1 capsule (300 mg) by mouth At Bedtime    Chronic low back pain, unspecified back pain laterality, with sciatica presence unspecified       lidocaine 4 % Crea cream    LMX4    1 Tube    Apply 3 times daily as needed for pain    Contusion of rib on left side, sequela       losartan 100 MG tablet    COZAAR    90 tablet    Take 1 tablet (100 mg) by mouth daily    Benign essential hypertension       mesalamine 1.2 g EC tablet    LIALDA    180 tablet    Take 2 tablets (2,400 mg) by mouth every morning    Ulcerative pancolitis without complication (H)       NARCAN nasal spray   Generic drug:  naloxone           * oxyCODONE-acetaminophen  MG per tablet    PERCOCET     Take 1 tablet by mouth 2 times daily    Ulcerative pancolitis without complication (H)       * oxyCODONE-acetaminophen 5-325 MG per tablet    PERCOCET    120 tablet    Take 1 tablet by mouth every 6 hours as needed for moderate to severe pain    Chronic low back pain, unspecified back pain laterality, with sciatica presence unspecified, Chronic neck pain, Chronic narcotic use       sulfaSALAzine 500 MG tablet    AZULFIDINE    180 tablet    Take 2 tablets (1,000 mg) by mouth 2 times daily    Ulcerative  "pancolitis without complication (H)       * TYLENOL PO      Take 1 tablet by mouth every 4 hours as needed for mild pain or fever '\" I take 2500 to 3500 mg of tylenol daily.\"        * acetaminophen 500 MG tablet    TYLENOL    1 Bottle    Take 1-2 tablets (500-1,000 mg) by mouth every 6 hours as needed for mild pain    Migraine without aura and without status migrainosus, not intractable       * Notice:  This list has 4 medication(s) that are the same as other medications prescribed for you. Read the directions carefully, and ask your doctor or other care provider to review them with you.      "

## 2018-08-03 NOTE — NURSING NOTE
Lidocaine-epinephrine 1-1:753467 % injection   1mL once for one use, starting 8/3/2018 ending 8/3/2018,  2mL disp, R-0, injection  Injected by Dr. Guevara

## 2018-08-08 LAB — COPATH REPORT: NORMAL

## 2018-08-17 ENCOUNTER — ALLIED HEALTH/NURSE VISIT (OUTPATIENT)
Dept: DERMATOLOGY | Facility: CLINIC | Age: 53
End: 2018-08-17
Payer: COMMERCIAL

## 2018-08-17 DIAGNOSIS — L70.0 OPEN COMEDONE: Primary | ICD-10-CM

## 2018-08-17 ASSESSMENT — PAIN SCALES - GENERAL: PAINLEVEL: NO PAIN (0)

## 2018-08-17 NOTE — MR AVS SNAPSHOT
After Visit Summary   8/17/2018    Parrish Muir    MRN: [id number]           Patient Information     Date Of Birth          1965        Visit Information        Provider Department      8/17/2018 10:15 AM Nurse, Ar[facility] D[facility]at[facility] Dermatology        Today's Diagnoses     Open comedone    -  1       Follow-ups after your visit        Your next 10 appointments already scheduled     Aug 17, 2018 10:15 AM CDT   Nurse Visit with Uc Dermatology Nurse   [facility] Dermatology ([facility] [place])    [address]  3rd Floor  [place] [id number] [phone]            Aug 23, 2018 10:30 AM CDT   Colonoscopy with Francisco Grigsby MD   [facility] ([facility])    [address]  [address]  [place] [id number] [phone]            Feb 18, 2019  8:40 AM CST   (Arrive by 8:25 AM)   RETURN INFLAMMATORY BOWEL DISEASE with Francisco Grigsby MD   [facility] Gastroenterology and IBD Clinic ([facility])    [address]  4th Floor  [place] [id number] [phone]              Who to contact     Please call your clinic at [phone] to:    Ask questions about your health    Make or cancel appointments    Discuss your medicines    Learn about your test results    Speak to your doctor            Additional Information About Your Visit        The History Press Information     [employer] gives you secure access to your electronic health record. If you see a primary care provider, you can also send messages to your care team and make appointments. If you have questions, please call your primary care clinic.  If you do not have a primary care provider, please call [phone] and they will assist you.      The History [employer] is an electronic gateway that provides easy, online access to your medical records. With [employer], you can request a clinic appointment, read your test results, renew a prescription or communicate with  your care team.     To access your existing account, please contact your [facility] or call [phone] for assistance.        Care EveryWhere ID     This is your Care EveryWhere ID. This could be used by other organizations to access your [place] medical records  [id number]         Blood Pressure from Last 3 Encounters:   06/06/18 (!) 174/100   04/16/18 (!) 158/99   02/26/18 (!) 153/96    Weight from Last 3 Encounters:   06/06/18 99.1 kg (218 lb 8 oz)   04/16/18 99.1 kg (218 lb 6.4 oz)   02/26/18 98.5 kg (217 lb 1.6 oz)              Today, you had the following     No orders found for display       Primary Care Provider Office Phone # Fax #    Seb Lafleur [phone] [address] [facility] [place] [facility] [address]        Equal Access to Services     [facility]A [facility] : Hadii aad ku hadasho Soradha, waaxda luqadaha, qaybta kaalmada adeegyada, tegan pérez . So [facility] [phone].    ATENCIÓN: Si habla español, tiene a boyd disposición servicios gratuitos de asistencia lingüística. Llame al [phone].    We comply with applicable federal civil rights laws and [place] laws. We do not discriminate on the basis of race, color, national origin, age, disability, sex, sexual orientation, or gender identity.            Thank you!     Thank you for choosing [facility] DERMATOLOGY  for your care. Our goal is always to provide you with excellent care. Hearing back from our patients is one way we can continue to improve our services. Please take a few minutes to complete the written survey that you may receive in the mail after your visit with us. Thank you!             Your Updated Medication List - Protect others around you: Learn how to safely use, store and throw away your medicines at www.disposemymeds.org.          This list is accurate as of 8/17/18 10:00 AM.  Always use your most recent med list.                   Brand Name Dispense  Instructions for use Diagnosis    atenolol 25 MG tablet    TENORMIN    90 tablet    Take 1 tablet (25 mg) by mouth daily    Benign essential hypertension       baclofen 10 MG tablet    LIORESAL    90 tablet    Take 1 tablet (10 mg) by mouth 2 times daily    Chronic low back pain, unspecified back pain laterality, with sciatica presence unspecified       benzoyl peroxide 10 % topical liquid     227 g    Use daily as directed when showering and be careful about bleaching of towels    Open comedone       cholecalciferol 5000 units Caps capsule    vitamin D3     TAKE ONE CAPSULE BY MOUTH ONCE DAILY        escitalopram 20 MG tablet    LEXAPRO    90 tablet    Take 1 tablet (20 mg) by mouth daily    Major depressive disorder, single episode, moderate (H)       FOLIC ACID PO      Take 400 mcg by mouth daily        gabapentin 300 MG capsule    NEURONTIN    30 capsule    Take 1 capsule (300 mg) by mouth At Bedtime    Chronic low back pain, unspecified back pain laterality, with sciatica presence unspecified       lidocaine 4 % Crea cream    LMX4    1 Tube    Apply 3 times daily as needed for pain    Contusion of rib on left side, sequela       losartan 100 MG tablet    COZAAR    90 tablet    Take 1 tablet (100 mg) by mouth daily    Benign essential hypertension       mesalamine 1.2 g EC tablet    LIALDA    180 tablet    Take 2 tablets (2,400 mg) by mouth every morning    Ulcerative pancolitis without complication (H)       NARCAN nasal spray   Generic drug:  naloxone           * oxyCODONE-acetaminophen  MG per tablet    PERCOCET     Take 1 tablet by mouth 2 times daily    Ulcerative pancolitis without complication (H)       * oxyCODONE-acetaminophen 5-325 MG per tablet    PERCOCET    120 tablet    Take 1 tablet by mouth every 6 hours as needed for moderate to severe pain    Chronic low back pain, unspecified back pain laterality, with sciatica presence unspecified, Chronic neck pain, Chronic narcotic use        "sulfaSALAzine 500 MG tablet    AZULFIDINE    180 tablet    Take 2 tablets (1,000 mg) by mouth 2 times daily    Ulcerative pancolitis without complication (H)       * TYLENOL PO      Take 1 tablet by mouth every 4 hours as needed for mild pain or fever '\" I take 2500 to 3500 mg of tylenol daily.\"        * acetaminophen 500 MG tablet    TYLENOL    1 Bottle    Take 1-2 tablets (500-1,000 mg) by mouth every 6 hours as needed for mild pain    Migraine without aura and without status migrainosus, not intractable       * Notice:  This list has 4 medication(s) that are the same as other medications prescribed for you. Read the directions carefully, and ask your doctor or other care provider to review them with you.      "

## 2018-08-17 NOTE — NURSING NOTE
Chief Complaint   Patient presents with     Derm Problem     Parrish is here today for suture removal on his upper back     Soha Shannon LPN

## 2018-08-17 NOTE — NURSING NOTE
Patient here today for suture removal on his upper back. Patient presented with no concerns of sutures. No tenderness, redness, or swelling noted. Patient denies pain. 1 suture removed from one site and 2 sutures removed from the other site. Patient tolerated well. Vaseline and bandaid applied to both sites. Wound care instructions reviewed. Patient had no further questions or concerns.  Soha Shannon LPN

## 2018-08-20 ENCOUNTER — TELEPHONE (OUTPATIENT)
Dept: GASTROENTEROLOGY | Facility: OUTPATIENT CENTER | Age: 53
End: 2018-08-20

## 2018-08-20 DIAGNOSIS — K51.00 ULCERATIVE PANCOLITIS (H): Primary | ICD-10-CM

## 2018-08-20 NOTE — TELEPHONE ENCOUNTER
Patient taking any blood thinners ? No    Heart disease ? denies    Lung disease ? denies      Sleep apnea ? denies    Diabetic ? denies    Kidney disease ? denies    Dialysis ? n/a    Electronic implanted medical devices ? denies    Are you taking any narcotic pain medication ?   Percocet What is your daily dosage ?    PTSD ? n/a    Prep instructions reviewed with patient ? Instructions, policy,MAC sedation plan reviewed. Advised patient to have someone stay with them post exam.    Pharmacy : [facility]    Indication for procedure :Ulcerative pancolitis without complication (H) [K51.00    Referring provider : Self     Arrival Time : 9:30 AM

## 2018-08-23 ENCOUNTER — DOCUMENTATION ONLY (OUTPATIENT)
Dept: GASTROENTEROLOGY | Facility: OUTPATIENT CENTER | Age: 53
End: 2018-08-23
Payer: COMMERCIAL

## 2018-08-23 ENCOUNTER — TRANSFERRED RECORDS (OUTPATIENT)
Dept: HEALTH INFORMATION MANAGEMENT | Facility: CLINIC | Age: 53
End: 2018-08-23

## 2018-08-29 LAB — COPATH REPORT: NORMAL

## 2018-09-11 ENCOUNTER — TELEPHONE (OUTPATIENT)
Dept: GASTROENTEROLOGY | Facility: CLINIC | Age: 53
End: 2018-09-11

## 2018-09-11 NOTE — TELEPHONE ENCOUNTER
LVM for patient in regards to scheduling f/u appointment with Kory Reese within 1 month per Dr. Grigsby. Left call back number for patient to call and schedule appointment.

## 2018-09-12 ENCOUNTER — TELEPHONE (OUTPATIENT)
Dept: GASTROENTEROLOGY | Facility: CLINIC | Age: 53
End: 2018-09-12

## 2018-09-12 NOTE — TELEPHONE ENCOUNTER
LVM #2 for patient in regards to scheduling f/u appointment with Kory Reese within 1 month per Dr. Grigsby. Left call back number for patient to call and schedule appointment.

## 2018-10-16 ENCOUNTER — TELEPHONE (OUTPATIENT)
Dept: GASTROENTEROLOGY | Facility: CLINIC | Age: 53
End: 2018-10-16

## 2018-10-17 ENCOUNTER — OFFICE VISIT (OUTPATIENT)
Dept: GASTROENTEROLOGY | Facility: CLINIC | Age: 53
End: 2018-10-17
Payer: COMMERCIAL

## 2018-10-17 VITALS
SYSTOLIC BLOOD PRESSURE: 144 MMHG | HEIGHT: 69 IN | HEART RATE: 57 BPM | OXYGEN SATURATION: 97 % | BODY MASS INDEX: 33.87 KG/M2 | DIASTOLIC BLOOD PRESSURE: 88 MMHG | WEIGHT: 228.7 LBS | TEMPERATURE: 98.2 F

## 2018-10-17 DIAGNOSIS — K51.00 ULCERATIVE PANCOLITIS WITHOUT COMPLICATION (H): ICD-10-CM

## 2018-10-17 LAB
ALBUMIN SERPL-MCNC: 3.6 G/DL (ref 3.4–5)
ALP SERPL-CCNC: 66 U/L (ref 40–150)
ALT SERPL W P-5'-P-CCNC: 77 U/L (ref 0–70)
AST SERPL W P-5'-P-CCNC: 35 U/L (ref 0–45)
BASOPHILS # BLD AUTO: 0.1 10E9/L (ref 0–0.2)
BASOPHILS NFR BLD AUTO: 0.8 %
BILIRUB DIRECT SERPL-MCNC: 0.2 MG/DL (ref 0–0.2)
BILIRUB SERPL-MCNC: 0.5 MG/DL (ref 0.2–1.3)
CRP SERPL-MCNC: <2.9 MG/L (ref 0–8)
DIFFERENTIAL METHOD BLD: NORMAL
EOSINOPHIL # BLD AUTO: 0.2 10E9/L (ref 0–0.7)
EOSINOPHIL NFR BLD AUTO: 3.2 %
ERYTHROCYTE [DISTWIDTH] IN BLOOD BY AUTOMATED COUNT: 12.4 % (ref 10–15)
ERYTHROCYTE [SEDIMENTATION RATE] IN BLOOD BY WESTERGREN METHOD: 6 MM/H (ref 0–20)
HCT VFR BLD AUTO: 42.8 % (ref 40–53)
HGB BLD-MCNC: 14.2 G/DL (ref 13.3–17.7)
IMM GRANULOCYTES # BLD: 0 10E9/L (ref 0–0.4)
IMM GRANULOCYTES NFR BLD: 0.2 %
LYMPHOCYTES # BLD AUTO: 2.4 10E9/L (ref 0.8–5.3)
LYMPHOCYTES NFR BLD AUTO: 37.9 %
MCH RBC QN AUTO: 29.9 PG (ref 26.5–33)
MCHC RBC AUTO-ENTMCNC: 33.2 G/DL (ref 31.5–36.5)
MCV RBC AUTO: 90 FL (ref 78–100)
MONOCYTES # BLD AUTO: 0.5 10E9/L (ref 0–1.3)
MONOCYTES NFR BLD AUTO: 7.9 %
NEUTROPHILS # BLD AUTO: 3.1 10E9/L (ref 1.6–8.3)
NEUTROPHILS NFR BLD AUTO: 50 %
NRBC # BLD AUTO: 0 10*3/UL
NRBC BLD AUTO-RTO: 0 /100
PLATELET # BLD AUTO: 225 10E9/L (ref 150–450)
PROT SERPL-MCNC: 7.4 G/DL (ref 6.8–8.8)
RBC # BLD AUTO: 4.75 10E12/L (ref 4.4–5.9)
WBC # BLD AUTO: 6.2 10E9/L (ref 4–11)

## 2018-10-17 RX ORDER — MESALAMINE 1.2 G/1
4800 TABLET, DELAYED RELEASE ORAL EVERY MORNING
Qty: 120 TABLET | Refills: 3 | Status: SHIPPED | OUTPATIENT
Start: 2018-10-17 | End: 2019-05-07

## 2018-10-17 RX ORDER — MESALAMINE 4 G/60ML
4 SUSPENSION RECTAL AT BEDTIME
Qty: 30 BOTTLE | Refills: 3 | Status: SHIPPED | OUTPATIENT
Start: 2018-10-17 | End: 2019-08-15

## 2018-10-17 ASSESSMENT — ENCOUNTER SYMPTOMS
HEADACHES: 1
MUSCLE WEAKNESS: 0
DISTURBANCES IN COORDINATION: 0
ARTHRALGIAS: 1
DECREASED CONCENTRATION: 0
LOSS OF CONSCIOUSNESS: 0
STIFFNESS: 1
MEMORY LOSS: 1
NERVOUS/ANXIOUS: 1
DIZZINESS: 0
SPEECH CHANGE: 0
PANIC: 0
NUMBNESS: 1
WEAKNESS: 0
TREMORS: 0
INSOMNIA: 0
JOINT SWELLING: 0
BACK PAIN: 1
DEPRESSION: 1
SEIZURES: 0
TINGLING: 0
NECK PAIN: 1
MUSCLE CRAMPS: 0
MYALGIAS: 1
PARALYSIS: 0

## 2018-10-17 ASSESSMENT — PAIN SCALES - GENERAL: PAINLEVEL: NO PAIN (0)

## 2018-10-17 NOTE — MR AVS SNAPSHOT
After Visit Summary   10/17/2018    Parrish Muir    MRN: [id number]           Patient Information     Date Of Birth          1965        Visit Information        Provider Department      10/17/2018 9:00 AM Kory Reese PA-C [facility] Gastroenterology [facility] IBD [facility]        Today's Diagnoses     Ulcerative pancolitis without complication (H)          Care Instructions    It was a pleasure taking care of you today.  I've included a brief summary of our discussion and care plan from today's visit below.  Please review this information with your primary care provider.  ______________________________________________________________________    My recommendations are summarized as follows:    -- Increase Lialda to 4 pills per day  -- Start Rowasa enemas nightly   -- Labs today  -- Next endoscopic assessment: Fall 2019    -- Patient with IBD we recommend supplementation vitamin D 1000 units daily and calcium 500 mg twice daily.  -- Vaccines/immunizations to be updated: flu vaccine, pneumonia vaccine  -- No NSAIDs (ibuprofen, or anything containing ibuprofen)       For additional resources about inflammatory bowel disease visit [email or web address]      Return to GI Clinic in 3 months to review your progress.    ______________________________________________________________________    Who do I call with any questions after my visit?  Please be in touch if there are any further questions that arise following today's visit.  There are multiple ways to contact your gastroenterology care team.        During business hours, you may reach a Gastroenterology nurse at [phone], option 3.       To schedule or reschedule an appointment, please call [phone].       You can always send a secure message through Vrvana.  Vrvana messages are answered by your nurse or doctor typically within 24 hours.  Please allow extra time on weekends and holidays.        For urgent/emergent  questions after business hours, you may reach the on-call GI Fellow by contacting the [facility]  at [phone].      In order for your refill to be processed in a timely fashion, it is your responsibility to ensure you follow the recommendations from your provider regarding your laboratory studies and follow up appointments.       How will I get the results of any tests ordered?    You will receive all of your results.  If you have signed up for [employer], any tests ordered at your visit will be available to you after your physician reviews them.  Typically this takes 1-2 weeks.  If there are urgent results that require a change in your care plan, your physician or nurse will call you to discuss the next steps.      What is Blurtthart?  [employer] is a secure way for you to access all of your healthcare records from the [facility].  It is a web based computer program, so you can sign on to it from any location.  It also allows you to send secure messages to your care team.  I recommend signing up for [employer] access if you have not already done so and are comfortable with using a computer.      How to I schedule a follow-up visit?  If you did not schedule a follow-up visit today, please call [phone] to schedule a follow-up office visit.        Sincerely,    Kory Reese PA-C  [facility]  Division of Gastroenterology                Follow-ups after your visit        Your next 10 appointments already scheduled     Feb 18, 2019  8:40 AM CST   (Arrive by 8:25 AM)   RETURN INFLAMMATORY BOWEL DISEASE with Francisco Grigsby MD   [facility] ([facility] [place])    [address] [place] [id number] [phone]              Future tests that were ordered for you today     Open Future Orders        Priority Expected Expires Ordered    CBC with platelets differential [WME756] Routine 10/17/2018 12/16/2018  "10/17/2018    Hepatic panel [LAB20] Routine 10/17/2018 12/16/2018 10/17/2018    CRP inflammation [LNP1655] Routine 10/17/2018 12/16/2018 10/17/2018    Erythrocyte sedimentation rate auto [RRW425] Routine 10/17/2018 12/16/2018 10/17/2018            Who to contact     Please call your clinic at [phone] to:    Ask questions about your health    Make or cancel appointments    Discuss your medicines    Learn about your test results    Speak to your doctor            Additional Information About Your Visit        Hy[employer] Information     [employer] gives you secure access to your electronic health record. If you see a primary care provider, you can also send messages to your care team and make appointments. If you have questions, please call your primary care clinic.  If you do not have a primary care provider, please call [phone] and they will assist you.      VOYAA is an electronic gateway that provides easy, online access to your medical records. With VOYAA, you can request a clinic appointment, read your test results, renew a prescription or communicate with your care team.     To access your existing account, please contact your [facility] or call [phone] for assistance.        Care EveryWhere ID     This is your Care EveryWhere ID. This could be used by other organizations to access your [place] medical records  [id number]        Your Vitals Were     Pulse Temperature Height Pulse Oximetry BMI (Body Mass Index)       57 98.2  F (36.8  C) (Oral) 1.753 m (5' 9\") 97% 33.77 kg/m2        Blood Pressure from Last 3 Encounters:   10/17/18 144/88   06/06/18 (!) 174/100   04/16/18 (!) 158/99    Weight from Last 3 Encounters:   10/17/18 103.7 kg (228 lb 11.2 oz)   06/06/18 99.1 kg (218 lb 8 oz)   04/16/18 99.1 kg (218 lb 6.4 oz)                 Today's Medication Changes          These changes are accurate as of 10/17/18  9:48 AM.  If you have any questions, ask your nurse or " doctor.               These medicines have changed or have updated prescriptions.        Dose/Directions    * mesalamine 1.2 g EC tablet   Commonly known as:  LIALDA   This may have changed:  how much to take   Used for:  Ulcerative pancolitis without complication (H)   Changed by:  Kory Reese PA-C        Dose:  4800 mg   Take 4 tablets (4,800 mg) by mouth every morning   Quantity:  120 tablet   Refills:  3       * mesalamine 4 g enema   Commonly known as:  ROWASA   This may have changed:  You were already taking a medication with the same name, and this prescription was added. Make sure you understand how and when to take each.   Used for:  Ulcerative pancolitis without complication (H)   Changed by:  Kory Reese PA-C        Dose:  4 g   Place 1 enema (4 g) rectally At Bedtime   Quantity:  30 Bottle   Refills:  3       * Notice:  This list has 2 medication(s) that are the same as other medications prescribed for you. Read the directions carefully, and ask your doctor or other care provider to review them with you.         Where to get your medicines      These medications were sent to [id number] [place] [id number] [place], [place] [address] [address] [place] [address]     Phone:  [phone]     mesalamine 1.2 g EC tablet    mesalamine 4 g enema                Primary Care Provider Office Phone # Fax #    Seb Lafleur [phone]       [address] [facility] [address]        Equal Access to Services     [facility]A [facility] AH: Hadii aad ku hadasho Soradha, waaxda luqadaha, qaybta kaalmada adeegyada, tegan london. So [facility] [phone].    ATENCIÓN: Si habla español, tiene a boyd disposición servicios gratuitos de asistencia lingüística. Llame al [phone].    We comply with applicable federal civil rights laws and [place] laws. We do not discriminate on the basis of race, color, national origin, age, disability, sex, sexual orientation, or  gender identity.            Thank you!     Thank you for choosing [facility] GASTROENTEROLOGY AND IBD CLINIC  for your care. Our goal is always to provide you with excellent care. Hearing back from our patients is one way we can continue to improve our services. Please take a few minutes to complete the written survey that you may receive in the mail after your visit with us. Thank you!             Your Updated Medication List - Protect others around you: Learn how to safely use, store and throw away your medicines at www.disposemymeds.org.          This list is accurate as of 10/17/18  9:48 AM.  Always use your most recent med list.                   Brand Name Dispense Instructions for use Diagnosis    atenolol 25 MG tablet    TENORMIN    90 tablet    Take 1 tablet (25 mg) by mouth daily    Benign essential hypertension       baclofen 10 MG tablet    LIORESAL    90 tablet    Take 1 tablet (10 mg) by mouth 2 times daily    Chronic low back pain, unspecified back pain laterality, with sciatica presence unspecified       benzoyl peroxide 10 % topical liquid     227 g    Use daily as directed when showering and be careful about bleaching of towels    Open comedone       cholecalciferol 5000 units Caps capsule    vitamin D3     TAKE ONE CAPSULE BY MOUTH ONCE DAILY        escitalopram 20 MG tablet    LEXAPRO    90 tablet    Take 1 tablet (20 mg) by mouth daily    Major depressive disorder, single episode, moderate (H)       FOLIC ACID PO      Take 400 mcg by mouth daily        gabapentin 300 MG capsule    NEURONTIN    30 capsule    Take 1 capsule (300 mg) by mouth At Bedtime    Chronic low back pain, unspecified back pain laterality, with sciatica presence unspecified       lidocaine 4 % Crea cream    LMX4    1 Tube    Apply 3 times daily as needed for pain    Contusion of rib on left side, sequela       losartan 100 MG tablet    COZAAR    90 tablet    Take 1 tablet (100 mg) by mouth daily    Benign essential hypertension  "      * mesalamine 1.2 g EC tablet    LIALDA    120 tablet    Take 4 tablets (4,800 mg) by mouth every morning    Ulcerative pancolitis without complication (H)       * mesalamine 4 g enema    ROWASA    30 Bottle    Place 1 enema (4 g) rectally At Bedtime    Ulcerative pancolitis without complication (H)       NARCAN nasal spray   Generic drug:  naloxone           * oxyCODONE-acetaminophen  MG per tablet    PERCOCET     Take 1 tablet by mouth 2 times daily    Ulcerative pancolitis without complication (H)       * oxyCODONE-acetaminophen 5-325 MG per tablet    PERCOCET    120 tablet    Take 1 tablet by mouth every 6 hours as needed for moderate to severe pain    Chronic low back pain, unspecified back pain laterality, with sciatica presence unspecified, Chronic neck pain, Chronic narcotic use       sulfaSALAzine 500 MG tablet    AZULFIDINE    180 tablet    Take 2 tablets (1,000 mg) by mouth 2 times daily    Ulcerative pancolitis without complication (H)       * TYLENOL PO      Take 1 tablet by mouth every 4 hours as needed for mild pain or fever '\" I take 2500 to 3500 mg of tylenol daily.\"        * acetaminophen 500 MG tablet    TYLENOL    1 Bottle    Take 1-2 tablets (500-1,000 mg) by mouth every 6 hours as needed for mild pain    Migraine without aura and without status migrainosus, not intractable       * Notice:  This list has 6 medication(s) that are the same as other medications prescribed for you. Read the directions carefully, and ask your doctor or other care provider to review them with you.      "

## 2018-10-17 NOTE — PROGRESS NOTES
IBD CLINIC VISIT     CC/REFERRING MD:  Miguel Morales  REASON FOR FOLLOW UP: Ulcerative Colitis  COLLABORATING PHYSICIAN: Francisco Grigsby MD    IBD HISTORY  Age at diagnosis: 2004  Extent of disease: pan-colitis  Current UC medications: Lialda 2.4 g daily  Prior UC surgeries: None  Prior IBD Medications:   Asacol (when first diagnosed and did not work)  Prednisone  SSA 1000mg PO BID - controlled his disease for the most part, however, he does have intermittent abdominal cramping.    DRUG MONITORING  TPMT enzyme activity: n/a    6-TGN/6-MMPN levels: n/a    Biologic concentration:n/a    DISEASE ASSESSMENT  Labs:  Lab Results   Component Value Date    ALBUMIN 4.2 06/30/2017     Recent Labs   Lab Test  02/26/18   0939  03/27/15   1059   CRP  3.0   --    SED  7  5     Endoscopic assessment: 8/26/18 shows Esparza 2 distal sigmoid and rectum. Sessile cecal polyp unable to be removed due to wretching while under MAC sedation (likely related to increased abd pressure from scope looping in upper abd setting in lack of abd wall tone.  Enterography: --  Fecal calprotectin: --   C diff: --    ASSESSMENT/PLAN  Parrish is a 53-year-old male with ulcerative colitis, with moderate active disease (Esparza 2) on most recent endoscopic evaluation:     1.  Ulcerative colitis, pancolitis.  Despite endoscopic Esparza 2 active disease, patient is at baseline symptoms with 1-2 non-bloody stools per day. Important to take into account his narcotic use with this pattern.  We need to achieve both targets of endoscopic and symptomatic remission, which we have not been able to do with current regimen. Plan to maximize 5ASA with increasing lialda to 4.8g daily and will start Rowasa enemas nightly given that inflammation extended to distal sigmoid. This may be able to transition to a suppositories, but we will start with enemas (concern for ensuring healing in very distal rectum if applicator w/ enema misses this area).  -- Increase Lialda to 4 pills per  day  -- Start Rowasa enemas nightly   -- Labs today  -- Plan for fecal calprotectin after 3 months of maximizing 5ASA therapy  -- Next endoscopic assessment: Fall 2019    -- Patient with IBD we recommend supplementation vitamin D 1000 units daily and calcium 500 mg twice daily.  -- Vaccines/immunizations to be updated: flu vaccine, pneumonia vaccine  -- No NSAIDs (ibuprofen, or anything containing ibuprofen)     2. Colon cancer surveillance: 8/26/18 shows Esparza 2 distal sigmoid and rectum. Sessile cecal polyp unable to be removed due to wretching while under MAC sedation (likely related to increased abd pressure from scope looping in upper abd setting in lack of abd wall tone. Will need to repeat in one year at hospital with general anesthesia.    3. 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  Dysplasia screening is recommended 2018.    Cervical cancer screening: N/A    Skin cancer screening: Since patient is not immunocompromised, this is per patient's dermatologist  recommendations.    Depression Screening:  -- Over the last month, have you felt down, depressed, or hopeless? Yes  -- Over the last month, have you felt little interest or pleasure doing things? Yes    Misc:  -- Avoid tobacco use  -- Avoid NSAIDs as there is potentially a 25% chance of causing an IBD flare    RTC 3 months    Thank you for this consultation.  It was a pleasure to participate in the care of this patient; please contact us with any further questions.      Kory Reese PA-C  Division of Gastroenterology, Hepatology and Nutrition  [facility]    HPI:   A 53-year-old male with history of ulcerative colitis since 2003, currently on Lialda 2.4g monotherapy with other past medical history to include hypertension, depression. Recently had colonoscopy 8/2018 showing moderate chronic active disease present in distal sigmoid and rectum.  No change in therapy was made after this finding and he has continued with Lialda 2.4 g daily.     The patient's current bowel pattern is 1-2 bowel movements per day without blood.  It is important to note that patient has chronic pain and takes narcotics (two 10mg oxycodone timed release + three 5 mg percocet daily).  For 3 weeks following the colonoscopy he has had cramping and loose bowel movements, now back to baseline, however his BMs are still urgent. No accidents or nighttime stools. No blood in the stool during this period of time.     Last colonoscopy 8/26/18 shows Esparza 2 distal sigmoid and rectum. Sessile cecal polyp unable to be removed due to wretching while under MAC sedation (likely related to increased abd pressure from scope looping in upper abd setting in lack of abd wall tone), with plan to proceed with colonoscopy in one year at the hospital with general anesthesia.    Patient denies BRBPR, melena, urgency, hesitancy passing flatus, skin changes, vision changes and nightime stools    ROS:    No fevers or chills  No weight loss  No blurry vision,  "double vision or change in vision  No sore throat  No lymphadenopathy  + headache  No paraesthesias, or weakness in a limb  No shortness of breath or wheezing  No chest pain or pressure  + arthralgias or myalgias  No rashes or skin changes  No odynophagia or dysphagia  No BRBPR, hematochezia, melena  No dysuria, frequency or urgency  No hot/cold intolerance or polyria  + anxiety or depression    Extra intestinal manifestations of IBD:  No uveitis/episcleritis  No aphthous ulcers   No arthritis   No erythema nodosum/pyoderma gangrenosum.     PERTINENT PAST MEDICAL HISTORY:  As noted above    PREVIOUS SURGERIES:  Past Surgical History:   Procedure Laterality Date     COLONOSCOPY WITH CO2 INSUFFLATION N/A 11/6/2015    Procedure: COLONOSCOPY WITH CO2 INSUFFLATION;  Surgeon: Francisco Grigsby MD;  Location:  OR     ALLERGIES:     Allergies   Allergen Reactions     Morphine Sulfate      Severe hallucinations       PERTINENT MEDICATIONS:    Current Outpatient Prescriptions:      Acetaminophen (TYLENOL PO), Take 1 tablet by mouth every 4 hours as needed for mild pain or fever '\" I take 2500 to 3500 mg of tylenol daily.\", Disp: , Rfl:      acetaminophen (TYLENOL) 500 MG tablet, Take 1-2 tablets (500-1,000 mg) by mouth every 6 hours as needed for mild pain, Disp: 1 Bottle, Rfl: 3     atenolol (TENORMIN) 25 MG tablet, Take 1 tablet (25 mg) by mouth daily, Disp: 90 tablet, Rfl: 1     baclofen (LIORESAL) 10 MG tablet, Take 1 tablet (10 mg) by mouth 2 times daily, Disp: 90 tablet, Rfl: 1     benzoyl peroxide 10 % topical liquid, Use daily as directed when showering and be careful about bleaching of towels, Disp: 227 g, Rfl: 3     cholecalciferol (VITAMIN D3) 5000 units CAPS capsule, TAKE ONE CAPSULE BY MOUTH ONCE DAILY, Disp: , Rfl: 3     gabapentin (NEURONTIN) 300 MG capsule, Take 1 capsule (300 mg) by mouth At Bedtime, Disp: 30 capsule, Rfl: 1     mesalamine (LIALDA) 1.2 g EC tablet, Take 4 tablets (4,800 mg) by mouth every " morning, Disp: 120 tablet, Rfl: 3     mesalamine (ROWASA) 4 g enema, Place 1 enema (4 g) rectally At Bedtime, Disp: 30 Bottle, Rfl: 3     oxyCODONE-acetaminophen (PERCOCET)  MG per tablet, Take 1 tablet by mouth 2 times daily, Disp: , Rfl:      oxyCODONE-acetaminophen (PERCOCET) 5-325 MG per tablet, Take 1 tablet by mouth every 6 hours as needed for moderate to severe pain, Disp: 120 tablet, Rfl: 0     escitalopram (LEXAPRO) 20 MG tablet, Take 1 tablet (20 mg) by mouth daily (Patient not taking: Reported on 10/17/2018), Disp: 90 tablet, Rfl: 1     FOLIC ACID PO, Take 400 mcg by mouth daily, Disp: , Rfl:      lidocaine (LMX4) 4 % CREA cream, Apply 3 times daily as needed for pain (Patient not taking: Reported on 10/17/2018), Disp: 1 Tube, Rfl: 1     losartan (COZAAR) 100 MG tablet, Take 1 tablet (100 mg) by mouth daily (Patient not taking: Reported on 10/17/2018), Disp: 90 tablet, Rfl: 1     NARCAN nasal spray, , Disp: , Rfl:      sulfaSALAzine (AZULFIDINE) 500 MG tablet, Take 2 tablets (1,000 mg) by mouth 2 times daily (Patient not taking: Reported on 10/17/2018), Disp: 180 tablet, Rfl: 0    SOCIAL HISTORY:  Social History     Social History     Marital status:      Spouse name: N/A     Number of children: N/A     Years of education: N/A     Occupational History     Not on file.     Social History Main Topics     Smoking status: Never Smoker     Smokeless tobacco: Never Used     Alcohol use No     Drug use: No     Sexual activity: Not on file     Other Topics Concern     Not on file     Social History Narrative    Lives in [place], 4 children.  Lives with daughter.  On disability.   vetran       FAMILY HISTORY:  Family History   Problem Relation Age of Onset     Alzheimer Disease Mother      Skin Cancer No family hx of      Melanoma No family hx of        Past/family/social history reviewed and no changes    PHYSICAL EXAMINATION:  Constitutional: aaox3, cooperative, pleasant, not  "dyspneic/diaphoretic, no acute distress  Vitals reviewed: /88  Pulse 57  Temp 98.2  F (36.8  C) (Oral)  Ht 1.753 m (5' 9\")  Wt 103.7 kg (228 lb 11.2 oz)  SpO2 97%  BMI 33.77 kg/m2  Wt:   Wt Readings from Last 2 Encounters:   10/17/18 103.7 kg (228 lb 11.2 oz)   06/06/18 99.1 kg (218 lb 8 oz)      Eyes: Sclera anicteric/injected  Ears/nose/mouth/throat: Normal oropharynx without ulcers or exudate, mucus membranes moist, hearing intact  Neck: supple, thyroid normal size  CV: No edema  Respiratory: Unlabored breathing  Lymph: No axillary, submandibular, supraclavicular or inguinal lymphadenopathy  Abd: Nondistended, +bs, no hepatosplenomegaly, nontender, no peritoneal signs  Skin: warm, perfused, no jaundice  Psych: Normal affect  MSK: Normal gait      PERTINENT STUDIES:  Most recent CBC:  Recent Labs   Lab Test  02/26/18   0939  06/30/17   1612   WBC  5.8  5.3   HGB  14.7  14.7   HCT  43.4  42.5   PLT  234  232     Most recent hepatic panel:  Recent Labs   Lab Test  02/26/18   0939  06/30/17   1612   ALT  60  40   AST  48*  24     Most recent creatinine:  Recent Labs   Lab Test  06/06/18   1539  02/26/18   0939   CR  1.07  1.01               "

## 2018-10-17 NOTE — PATIENT INSTRUCTIONS
It was a pleasure taking care of you today.  I've included a brief summary of our discussion and care plan from today's visit below.  Please review this information with your primary care provider.  ______________________________________________________________________    My recommendations are summarized as follows:    -- Increase Lialda to 4 pills per day  -- Start Rowasa enemas nightly   -- Labs today  -- Next endoscopic assessment: Fall 2019    -- Patient with IBD we recommend supplementation vitamin D 1000 units daily and calcium 500 mg twice daily.  -- Vaccines/immunizations to be updated: flu vaccine, pneumonia vaccine  -- No NSAIDs (ibuprofen, or anything containing ibuprofen)       For additional resources about inflammatory bowel disease visit [email or web address]      Return to GI Clinic in 3 months to review your progress.    ______________________________________________________________________    Who do I call with any questions after my visit?  Please be in touch if there are any further questions that arise following today's visit.  There are multiple ways to contact your gastroenterology care team.        During business hours, you may reach a Gastroenterology nurse at [phone], option 3.       To schedule or reschedule an appointment, please call [phone].       You can always send a secure message through [employer].  [employer] messages are answered by your nurse or doctor typically within 24 hours.  Please allow extra time on weekends and holidays.        For urgent/emergent questions after business hours, you may reach the on-call GI Fellow by contacting the [facility] at [phone].      In order for your refill to be processed in a timely fashion, it is your responsibility to ensure you follow the recommendations from your provider regarding your laboratory studies and follow up appointments.       How will I get the results of any tests ordered?     You will receive all of your results.  If you have signed up for [employer], any tests ordered at your visit will be available to you after your physician reviews them.  Typically this takes 1-2 weeks.  If there are urgent results that require a change in your care plan, your physician or nurse will call you to discuss the next steps.      What is [employer]?  [employer] is a secure way for you to access all of your healthcare records from the [facility].  It is a web based computer program, so you can sign on to it from any location.  It also allows you to send secure messages to your care team.  I recommend signing up for [employer] access if you have not already done so and are comfortable with using a computer.      How to I schedule a follow-up visit?  If you did not schedule a follow-up visit today, please call [phone] to schedule a follow-up office visit.        Sincerely,    Kory Reese PA-C  [facility]  Division of Gastroenterology

## 2018-10-17 NOTE — LETTER
10/17/2018       RE: Parrish Muir  [address] [place] [address]     Dear Colleague,    Thank you for referring your patient, Parrish Muir, to the [facility] GASTROENTEROLOG[facility] IBD CLINIC at [facility]. Please see a copy of my visit note below.    IBD CLINIC VISIT     CC/REFERRING MD:  Miguel Morales  REASON FOR FOLLOW UP: Ulcerative Colitis  COLLABORATING PHYSICIAN: Francisco Grigsby MD    IBD HISTORY  Age at diagnosis: 2004  Extent of disease: pan-colitis  Current UC medications: Lialda 2.4 g daily  Prior UC surgeries: None  Prior IBD Medications:   Asacol (when first diagnosed and did not work)  Prednisone  SSA 1000mg PO BID - controlled his disease for the most part, however, he does have intermittent abdominal cramping.    DRUG MONITORING  TPMT enzyme activity: n/a    6-TGN/6-MMPN levels: n/a    Biologic concentration:n/a    DISEASE ASSESSMENT  Labs:  Lab Results   Component Value Date    ALBUMIN 4.2 06/30/2017     Recent Labs   Lab Test  02/26/18   0939  03/27/15   1059   CRP  3.0   --    SED  7  5     Endoscopic assessment: 8/26/18 shows Esparza 2 distal sigmoid and rectum. Sessile cecal polyp unable to be removed due to wretching while under MAC sedation (likely related to increased abd pressure from scope looping in upper abd setting in lack of abd wall tone.  Enterography: --  Fecal calprotectin: --   C diff: --    ASSESSMENT/PLAN  Parrish is a 53-year-old male with ulcerative colitis, with moderate active disease (Esparza 2) on most recent endoscopic evaluation:     1.  Ulcerative colitis, pancolitis.  Despite endoscopic Esparza 2 active disease, patient is at baseline symptoms with 1-2 non-bloody stools per day. Important to take into account his narcotic use with this pattern.  We need to achieve both targets of endoscopic and symptomatic remission, which we have not been able to do with current regimen. Plan to maximize 5ASA with increasing lialda  to 4.8g daily and will start Rowasa enemas nightly given that inflammation extended to distal sigmoid. This may be able to transition to a suppositories, but we will start with enemas (concern for ensuring healing in very distal rectum if applicator w/ enema misses this area).  -- Increase Lialda to 4 pills per day  -- Start Rowasa enemas nightly   -- Labs today  -- Plan for fecal calprotectin after 3 months of maximizing 5ASA therapy  -- Next endoscopic assessment: Fall 2019    -- Patient with IBD we recommend supplementation vitamin D 1000 units daily and calcium 500 mg twice daily.  -- Vaccines/immunizations to be updated: flu vaccine, pneumonia vaccine  -- No NSAIDs (ibuprofen, or anything containing ibuprofen)     2. Colon cancer surveillance: 8/26/18 shows Esparza 2 distal sigmoid and rectum. Sessile cecal polyp unable to be removed due to wretching while under MAC sedation (likely related to increased abd pressure from scope looping in upper abd setting in lack of abd wall tone. Will need to repeat in one year at hospital with general anesthesia.    3. 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  Dysplasia screening is recommended 2018.    Cervical cancer screening: N/A    Skin cancer screening: Since patient is not immunocompromised, this is per patient's dermatologist recommendations.    Depression Screening:  -- Over the last month, have you felt down, depressed, or hopeless? Yes  -- Over the last month, have you felt little interest or pleasure doing things? Yes    Misc:  -- Avoid tobacco use  -- Avoid NSAIDs as there is potentially a 25% chance of causing an IBD flare    RTC 3 months    Thank you for this consultation.  It was a pleasure to participate in the care of this patient; please contact us with any further questions.      Kory Reese PA-C  Division of Gastroenterology, Hepatology and Nutrition  [facility]    HPI:   A 53-year-old male with history of ulcerative colitis since 2003, currently on Lialda 2.4g monotherapy with other past medical history to include hypertension, depression. Recently had colonoscopy 8/2018 showing moderate chronic active disease present in distal sigmoid and rectum.  No change in therapy was made after this finding and he has continued with Lialda 2.4 g daily.     The patient's current bowel pattern is 1-2 bowel movements per day without blood.  It is important to note that patient has chronic pain and takes narcotics (two 10mg oxycodone timed release + three 5 mg percocet daily).  For 3 weeks following the colonoscopy he has had cramping and loose bowel movements, now back to baseline, however his BMs are still urgent. No accidents or nighttime stools. No blood in the stool during this period of time.     Last colonoscopy 8/26/18 shows Esparza 2 distal sigmoid and rectum. Sessile cecal polyp unable to be removed due to wretching while under MAC sedation (likely related to increased abd pressure from scope looping in upper abd  "setting in lack of abd wall tone), with plan to proceed with colonoscopy in one year at the hospital with general anesthesia.    Patient denies BRBPR, melena, urgency, hesitancy passing flatus, skin changes, vision changes and nightime stools    ROS:    No fevers or chills  No weight loss  No blurry vision, double vision or change in vision  No sore throat  No lymphadenopathy  + headache  No paraesthesias, or weakness in a limb  No shortness of breath or wheezing  No chest pain or pressure  + arthralgias or myalgias  No rashes or skin changes  No odynophagia or dysphagia  No BRBPR, hematochezia, melena  No dysuria, frequency or urgency  No hot/cold intolerance or polyria  + anxiety or depression    Extra intestinal manifestations of IBD:  No uveitis/episcleritis  No aphthous ulcers   No arthritis   No erythema nodosum/pyoderma gangrenosum.     PERTINENT PAST MEDICAL HISTORY:  As noted above    PREVIOUS SURGERIES:  Past Surgical History:   Procedure Laterality Date     COLONOSCOPY WITH CO2 INSUFFLATION N/A 11/6/2015    Procedure: COLONOSCOPY WITH CO2 INSUFFLATION;  Surgeon: Francisco Grigsby MD;  Location:  OR     ALLERGIES:     Allergies   Allergen Reactions     Morphine Sulfate      Severe hallucinations       PERTINENT MEDICATIONS:    Current Outpatient Prescriptions:      Acetaminophen (TYLENOL PO), Take 1 tablet by mouth every 4 hours as needed for mild pain or fever '\" I take 2500 to 3500 mg of tylenol daily.\", Disp: , Rfl:      acetaminophen (TYLENOL) 500 MG tablet, Take 1-2 tablets (500-1,000 mg) by mouth every 6 hours as needed for mild pain, Disp: 1 Bottle, Rfl: 3     atenolol (TENORMIN) 25 MG tablet, Take 1 tablet (25 mg) by mouth daily, Disp: 90 tablet, Rfl: 1     baclofen (LIORESAL) 10 MG tablet, Take 1 tablet (10 mg) by mouth 2 times daily, Disp: 90 tablet, Rfl: 1     benzoyl peroxide 10 % topical liquid, Use daily as directed when showering and be careful about bleaching of towels, Disp: 227 g, Rfl: " 3     cholecalciferol (VITAMIN D3) 5000 units CAPS capsule, TAKE ONE CAPSULE BY MOUTH ONCE DAILY, Disp: , Rfl: 3     gabapentin (NEURONTIN) 300 MG capsule, Take 1 capsule (300 mg) by mouth At Bedtime, Disp: 30 capsule, Rfl: 1     mesalamine (LIALDA) 1.2 g EC tablet, Take 4 tablets (4,800 mg) by mouth every morning, Disp: 120 tablet, Rfl: 3     mesalamine (ROWASA) 4 g enema, Place 1 enema (4 g) rectally At Bedtime, Disp: 30 Bottle, Rfl: 3     oxyCODONE-acetaminophen (PERCOCET)  MG per tablet, Take 1 tablet by mouth 2 times daily, Disp: , Rfl:      oxyCODONE-acetaminophen (PERCOCET) 5-325 MG per tablet, Take 1 tablet by mouth every 6 hours as needed for moderate to severe pain, Disp: 120 tablet, Rfl: 0     escitalopram (LEXAPRO) 20 MG tablet, Take 1 tablet (20 mg) by mouth daily (Patient not taking: Reported on 10/17/2018), Disp: 90 tablet, Rfl: 1     FOLIC ACID PO, Take 400 mcg by mouth daily, Disp: , Rfl:      lidocaine (LMX4) 4 % CREA cream, Apply 3 times daily as needed for pain (Patient not taking: Reported on 10/17/2018), Disp: 1 Tube, Rfl: 1     losartan (COZAAR) 100 MG tablet, Take 1 tablet (100 mg) by mouth daily (Patient not taking: Reported on 10/17/2018), Disp: 90 tablet, Rfl: 1     NARCAN nasal spray, , Disp: , Rfl:      sulfaSALAzine (AZULFIDINE) 500 MG tablet, Take 2 tablets (1,000 mg) by mouth 2 times daily (Patient not taking: Reported on 10/17/2018), Disp: 180 tablet, Rfl: 0    SOCIAL HISTORY:  Social History     Social History     Marital status:      Spouse name: N/A     Number of children: N/A     Years of education: N/A     Occupational History     Not on file.     Social History Main Topics     Smoking status: Never Smoker     Smokeless tobacco: Never Used     Alcohol use No     Drug use: No     Sexual activity: Not on file     Other Topics Concern     Not on file     Social History Narrative    Lives in [place], 4 children.  Lives with daughter.  On disability.  Kony vetran  "      FAMILY HISTORY:  Family History   Problem Relation Age of Onset     Alzheimer Disease Mother      Skin Cancer No family hx of      Melanoma No family hx of        Past/family/social history reviewed and no changes    PHYSICAL EXAMINATION:  Constitutional: aaox3, cooperative, pleasant, not dyspneic/diaphoretic, no acute distress  Vitals reviewed: /88  Pulse 57  Temp 98.2  F (36.8  C) (Oral)  Ht 1.753 m (5' 9\")  Wt 103.7 kg (228 lb 11.2 oz)  SpO2 97%  BMI 33.77 kg/m2  Wt:   Wt Readings from Last 2 Encounters:   10/17/18 103.7 kg (228 lb 11.2 oz)   06/06/18 99.1 kg (218 lb 8 oz)      Eyes: Sclera anicteric/injected  Ears/nose/mouth/throat: Normal oropharynx without ulcers or exudate, mucus membranes moist, hearing intact  Neck: supple, thyroid normal size  CV: No edema  Respiratory: Unlabored breathing  Lymph: No axillary, submandibular, supraclavicular or inguinal lymphadenopathy  Abd: Nondistended, +bs, no hepatosplenomegaly, nontender, no peritoneal signs  Skin: warm, perfused, no jaundice  Psych: Normal affect  MSK: Normal gait      PERTINENT STUDIES:  Most recent CBC:  Recent Labs   Lab Test  02/26/18   0939  06/30/17   1612   WBC  5.8  5.3   HGB  14.7  14.7   HCT  43.4  42.5   PLT  234  232     Most recent hepatic panel:  Recent Labs   Lab Test  02/26/18   0939  06/30/17   1612   ALT  60  40   AST  48*  24     Most recent creatinine:  Recent Labs   Lab Test  06/06/18   1539  02/26/18   0939   CR  1.07  1.01         "

## 2018-10-17 NOTE — NURSING NOTE
"Chief Complaint   Patient presents with     RECHECK     3 months follow up       Vitals:    10/17/18 0846   BP: 144/88   Pulse: 57   Temp: 98.2  F (36.8  C)   TempSrc: Oral   SpO2: 97%   Weight: 103.7 kg (228 lb 11.2 oz)   Height: 1.753 m (5' 9\")       Body mass index is 33.77 kg/(m^2).    VINCE Baxter                        "

## 2018-10-18 DIAGNOSIS — R79.89 ELEVATED LFTS: Primary | ICD-10-CM

## 2018-10-18 NOTE — PROGRESS NOTES
ALT 77 (H)  AST 35    Plan to repeat hepatic panel in 2 weeks.      Kory Reese PA-C  Division of Gastroenterology, Hepatology and Nutrition  [facility]

## 2018-10-19 ENCOUNTER — TELEPHONE (OUTPATIENT)
Dept: GASTROENTEROLOGY | Facility: CLINIC | Age: 53
End: 2018-10-19

## 2018-10-19 NOTE — TELEPHONE ENCOUNTER
Left message with patient sating that he will need to have follow up labs in two weeks due to the increased ALT.

## 2018-10-19 NOTE — TELEPHONE ENCOUNTER
----- Message from Kory Reese PA-C sent at 10/18/2018  4:23 PM CDT -----  Julienne Blackman,    Do you mind following up with Parrish to be sure he gets in to repeat labs in 2 weeks for follow up of elevated ALT?    Thank you!    Kory

## 2018-10-23 ENCOUNTER — PRE VISIT (OUTPATIENT)
Dept: NEUROLOGY | Facility: CLINIC | Age: 53
End: 2018-10-23

## 2018-10-23 NOTE — TELEPHONE ENCOUNTER
FUTURE VISIT INFORMATION      FUTURE VISIT INFORMATION:    Date: 10/25/18    Time: 12p    Location: [facility] [place], [place]  REFERRAL INFORMATION:    Referring provider:  Dr. Seb Lafleur    Referring providers clinic:  [facility]    Reason for visit/diagnosis  Migraines    RECORDS REQUESTED FROM:       Clinic name Comments Records Status Imaging Status   [facility] 6/6/18 OV regarding migraines Internal Internal                                   RECORDS STATUS      10/23/18: Internal referral from [facility] for migraines, records and images in EPIC

## 2018-10-25 ENCOUNTER — OFFICE VISIT (OUTPATIENT)
Dept: NEUROLOGY | Facility: CLINIC | Age: 53
End: 2018-10-25
Payer: COMMERCIAL

## 2018-10-25 VITALS — HEART RATE: 71 BPM | DIASTOLIC BLOOD PRESSURE: 85 MMHG | OXYGEN SATURATION: 99 % | SYSTOLIC BLOOD PRESSURE: 134 MMHG

## 2018-10-25 DIAGNOSIS — T39.95XA ANALGESIC OVERUSE HEADACHE: ICD-10-CM

## 2018-10-25 DIAGNOSIS — G44.89 CHRONIC MIXED HEADACHE SYNDROME: Primary | ICD-10-CM

## 2018-10-25 DIAGNOSIS — G44.40 ANALGESIC OVERUSE HEADACHE: ICD-10-CM

## 2018-10-25 DIAGNOSIS — G89.29 OTHER CHRONIC PAIN: ICD-10-CM

## 2018-10-25 RX ORDER — TOPIRAMATE 25 MG/1
TABLET, FILM COATED ORAL
Qty: 60 TABLET | Refills: 3 | Status: ON HOLD | OUTPATIENT
Start: 2018-10-25 | End: 2019-03-11

## 2018-10-25 ASSESSMENT — ENCOUNTER SYMPTOMS
DISTURBANCES IN COORDINATION: 0
CHILLS: 0
NIGHT SWEATS: 0
DEPRESSION: 1
WEIGHT LOSS: 0
FEVER: 0
ARTHRALGIAS: 1
STIFFNESS: 1
WEAKNESS: 0
NECK PAIN: 1
PANIC: 0
POLYDIPSIA: 0
INSOMNIA: 0
PARALYSIS: 0
MYALGIAS: 1
NERVOUS/ANXIOUS: 1
DECREASED CONCENTRATION: 0
WEIGHT GAIN: 1
MUSCLE CRAMPS: 0
BACK PAIN: 1
MEMORY LOSS: 0
LOSS OF CONSCIOUSNESS: 0
NUMBNESS: 0
TINGLING: 0
MUSCLE WEAKNESS: 0
DIZZINESS: 0
TREMORS: 0
FATIGUE: 0
SEIZURES: 0
DECREASED APPETITE: 0
ALTERED TEMPERATURE REGULATION: 0
JOINT SWELLING: 0
SPEECH CHANGE: 0
INCREASED ENERGY: 0
POLYPHAGIA: 0
HEADACHES: 1
HALLUCINATIONS: 0

## 2018-10-25 ASSESSMENT — PAIN SCALES - GENERAL: PAINLEVEL: MODERATE PAIN (5)

## 2018-10-25 ASSESSMENT — PATIENT HEALTH QUESTIONNAIRE - PHQ9: SUM OF ALL RESPONSES TO PHQ QUESTIONS 1-9: 8

## 2018-10-25 NOTE — PATIENT INSTRUCTIONS
A trial of topiramate for headache prevention as instructed. Drink at least 10+ glasses of water. Call our Clinic with your new medication related concerns, intolerance, mood changes, etc. Contraindicated if history of kidney stones or glaucoma  Headaches can be  due to chronic opioid use -talk to your PCP and Pain Management  Follow up in 8 weeks or sooner         Patient Education    Topiramate Oral capsule, extended-release    Topiramate Oral capsule, sprinkles    Topiramate Oral tablet  Topiramate Oral tablet  What is this medicine?  TOPIRAMATE (toe PYRE a mate) is used to treat seizures in adults or children with epilepsy. It is also used for the prevention of migraine headaches.  This medicine may be used for other purposes; ask your health care provider or pharmacist if you have questions.  What should I tell my health care provider before I take this medicine?  They need to know if you have any of these conditions:    bleeding disorders    cirrhosis of the liver or liver disease    diarrhea    glaucoma    kidney stones or kidney disease    low blood counts, like low white cell, platelet, or red cell counts    lung disease like asthma, obstructive pulmonary disease, emphysema    metabolic acidosis    on a ketogenic diet    schedule for surgery or a procedure    suicidal thoughts, plans, or attempt; a previous suicide attempt by you or a family member    an unusual or allergic reaction to topiramate, other medicines, foods, dyes, or preservatives    pregnant or trying to get pregnant    breast-feeding  How should I use this medicine?  Take this medicine by mouth with a glass of water. Follow the directions on the prescription label. Do not crush or chew. You may take this medicine with meals. Take your medicine at regular intervals. Do not take it more often than directed.  Talk to your pediatrician regarding the use of this medicine in children. Special care may be needed. While this drug may be prescribed  for children as young as 2 years of age for selected conditions, precautions do apply.  Overdosage: If you think you have taken too much of this medicine contact a poison control center or emergency room at once.  NOTE: This medicine is only for you. Do not share this medicine with others.  What if I miss a dose?  If you miss a dose, take it as soon as you can. If your next dose is to be taken in less than 6 hours, then do not take the missed dose. Take the next dose at your regular time. Do not take double or extra doses.  What may interact with this medicine?  Do not take this medicine with any of the following medications:    probenecid  This medicine may also interact with the following medications:    acetazolamide    alcohol    amitriptyline    aspirin and aspirin-like medicines    birth control pills    certain medicines for depression    certain medicines for seizures    certain medicines that treat or prevent blood clots like warfarin, enoxaparin, dalteparin, apixaban, dabigatran, and rivaroxaban    digoxin    hydrochlorothiazide    lithium    medicines for pain, sleep, or muscle relaxation    metformin    methazolamide    NSAIDS, medicines for pain and inflammation, like ibuprofen or naproxen    pioglitazone    risperidone  This list may not describe all possible interactions. Give your health care provider a list of all the medicines, herbs, non-prescription drugs, or dietary supplements you use. Also tell them if you smoke, drink alcohol, or use illegal drugs. Some items may interact with your medicine.  What should I watch for while using this medicine?  Visit your doctor or health care professional for regular checks on your progress. Do not stop taking this medicine suddenly. This increases the risk of seizures if you are using this medicine to control epilepsy. Wear a medical identification bracelet or chain to say you have epilepsy or seizures, and carry a card that lists all your medicines.  This  medicine can decrease sweating and increase your body temperature. Watch for signs of  sweating or fever, especially in children. Avoid extreme heat, hot baths, and saunas. Be careful about exercising, especially in hot weather. Contact your health care provider right away if you notice a fever or decrease in sweating.  You should drink plenty of fluids while taking this medicine. If you have had kidney stones in the past, this will help to reduce your chances of forming kidney stones.  If you have stomach pain, with nausea or vomiting and yellowing of your eyes or skin, call your doctor immediately.  You may get drowsy, dizzy, or have blurred vision. Do not drive, use machinery, or do anything that needs mental alertness until you know how this medicine affects you. To reduce dizziness, do not sit or stand up quickly, especially if you are an older patient. Alcohol can increase drowsiness and dizziness. Avoid alcoholic drinks.  If you notice blurred vision, eye pain, or other eye problems, seek medical attention at once for an eye exam.  The use of this medicine may increase the chance of suicidal thoughts or actions. Pay special attention to how you are responding while on this medicine. Any worsening of mood, or thoughts of suicide or dying should be reported to your health care professional right away.  This medicine may increase the chance of developing metabolic acidosis. If left untreated, this can cause kidney stones, bone disease, or slowed growth in children. Symptoms include breathing fast, fatigue, loss of appetite, irregular heartbeat, or loss of consciousness. Call your doctor immediately if you experience any of these side effects. Also, tell your doctor about any surgery you plan on having while taking this medicine since this may increase your risk for metabolic acidosis.  Birth control pills may not work properly while you are taking this medicine. Talk to your doctor about using an extra  method of birth control.  Women who become pregnant while using this medicine may enroll in the [place] Antiepileptic Drug Pregnancy Registry by calling 1-632.507.9907. This registry collects information about the safety of antiepileptic drug use during pregnancy.  What side effects may I notice from receiving this medicine?  Side effects that you should report to your doctor or health care professional as soon as possible:    allergic reactions like skin rash, itching or hives, swelling of the face, lips, or tongue    decreased sweating and/or rise in body temperature    depression    difficulty breathing, fast or irregular breathing patterns    difficulty speaking    difficulty walking or controlling muscle movements    hearing impairment    redness, blistering, peeling or loosening of the skin, including inside the mouth    tingling, pain or numbness in the hands or feet    unusual bleeding or bruising    unusually weak or tired    worsening of mood, thoughts or actions of suicide or dying  Side effects that usually do not require medical attention (report to your doctor or health care professional if they continue or are bothersome):    altered taste    back pain, joint or muscle aches and pains    diarrhea, or constipation    headache    loss of appetite    nausea    stomach upset, indigestion    tremors  This list may not describe all possible side effects. Call your doctor for medical advice about side effects. You may report side effects to FDA at 6-814-FDA-6634.  Where should I keep my medicine?  Keep out of the reach of children.  Store at room temperature between 15 and 30 degrees C (59 and 86 degrees F) in a tightly closed container. Protect from moisture. Throw away any unused medicine after the expiration date.  NOTE:This sheet is a summary. It may not cover all possible information. If you have questions about this medicine, talk to your doctor, pharmacist, or health care provider. Copyright   2016 [employer]
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
## 2018-10-25 NOTE — NURSING NOTE
Chief Complaint   Patient presents with     Consult     [facility] NEW - MIGRAINES (HISTORY OF CONCUSSIONS)       Rohit Muse, EMT

## 2018-10-25 NOTE — MR AVS SNAPSHOT
After Visit Summary   10/25/2018    Parrish Muir    MRN: [id number]           Patient Information     Date Of Birth          1965        Visit Information        Provider Department      10/25/2018 12:00 PM Lluvia Shah APRN CNP [facility]        Today's Diagnoses     Chronic mixed headache syndrome    -  1    Analgesic overuse headache        Other chronic pain          Care Instructions    A trial of topiramate for headache prevention as instructed. Drink at least 10+ glasses of water. Call our Clinic with your new medication related concerns, intolerance, mood changes, etc. Contraindicated if history of kidney stones or glaucoma  Headaches can be  due to chronic opioid use -talk to your PCP and Pain Management  Follow up in 8 weeks or sooner         Patient Education    Topiramate Oral capsule, extended-release    Topiramate Oral capsule, sprinkles    Topiramate Oral tablet  Topiramate Oral tablet  What is this medicine?  TOPIRAMATE (toe PYRE a mate) is used to treat seizures in adults or children with epilepsy. It is also used for the prevention of migraine headaches.  This medicine may be used for other purposes; ask your health care provider or pharmacist if you have questions.  What should I tell my health care provider before I take this medicine?  They need to know if you have any of these conditions:    bleeding disorders    cirrhosis of the liver or liver disease    diarrhea    glaucoma    kidney stones or kidney disease    low blood counts, like low white cell, platelet, or red cell counts    lung disease like asthma, obstructive pulmonary disease, emphysema    metabolic acidosis    on a ketogenic diet    schedule for surgery or a procedure    suicidal thoughts, plans, or attempt; a previous suicide attempt by you or a family member    an unusual or allergic reaction to topiramate, other medicines, foods, dyes, or preservatives    pregnant or trying to  get pregnant    breast-feeding  How should I use this medicine?  Take this medicine by mouth with a glass of water. Follow the directions on the prescription label. Do not crush or chew. You may take this medicine with meals. Take your medicine at regular intervals. Do not take it more often than directed.  Talk to your pediatrician regarding the use of this medicine in children. Special care may be needed. While this drug may be prescribed for children as young as 2 years of age for selected conditions, precautions do apply.  Overdosage: If you think you have taken too much of this medicine contact a poison control center or emergency room at once.  NOTE: This medicine is only for you. Do not share this medicine with others.  What if I miss a dose?  If you miss a dose, take it as soon as you can. If your next dose is to be taken in less than 6 hours, then do not take the missed dose. Take the next dose at your regular time. Do not take double or extra doses.  What may interact with this medicine?  Do not take this medicine with any of the following medications:    probenecid  This medicine may also interact with the following medications:    acetazolamide    alcohol    amitriptyline    aspirin and aspirin-like medicines    birth control pills    certain medicines for depression    certain medicines for seizures    certain medicines that treat or prevent blood clots like warfarin, enoxaparin, dalteparin, apixaban, dabigatran, and rivaroxaban    digoxin    hydrochlorothiazide    lithium    medicines for pain, sleep, or muscle relaxation    metformin    methazolamide    NSAIDS, medicines for pain and inflammation, like ibuprofen or naproxen    pioglitazone    risperidone  This list may not describe all possible interactions. Give your health care provider a list of all the medicines, herbs, non-prescription drugs, or dietary supplements you use. Also tell them if you smoke, drink alcohol, or use illegal drugs. Some  items may interact with your medicine.  What should I watch for while using this medicine?  Visit your doctor or health care professional for regular checks on your progress. Do not stop taking this medicine suddenly. This increases the risk of seizures if you are using this medicine to control epilepsy. Wear a medical identification bracelet or chain to say you have epilepsy or seizures, and carry a card that lists all your medicines.  This medicine can decrease sweating and increase your body temperature. Watch for signs of  sweating or fever, especially in children. Avoid extreme heat, hot baths, and saunas. Be careful about exercising, especially in hot weather. Contact your health care provider right away if you notice a fever or decrease in sweating.  You should drink plenty of fluids while taking this medicine. If you have had kidney stones in the past, this will help to reduce your chances of forming kidney stones.  If you have stomach pain, with nausea or vomiting and yellowing of your eyes or skin, call your doctor immediately.  You may get drowsy, dizzy, or have blurred vision. Do not drive, use machinery, or do anything that needs mental alertness until you know how this medicine affects you. To reduce dizziness, do not sit or stand up quickly, especially if you are an older patient. Alcohol can increase drowsiness and dizziness. Avoid alcoholic drinks.  If you notice blurred vision, eye pain, or other eye problems, seek medical attention at once for an eye exam.  The use of this medicine may increase the chance of suicidal thoughts or actions. Pay special attention to how you are responding while on this medicine. Any worsening of mood, or thoughts of suicide or dying should be reported to your health care professional right away.  This medicine may increase the chance of developing metabolic acidosis. If left untreated, this can cause kidney stones, bone disease, or slowed growth in children.  Symptoms include breathing fast, fatigue, loss of appetite, irregular heartbeat, or loss of consciousness. Call your doctor immediately if you experience any of these side effects. Also, tell your doctor about any surgery you plan on having while taking this medicine since this may increase your risk for metabolic acidosis.  Birth control pills may not work properly while you are taking this medicine. Talk to your doctor about using an extra method of birth control.  Women who become pregnant while using this medicine may enroll in the [place] Antiepileptic Drug Pregnancy Registry by calling 1-434.889.4354. This registry collects information about the safety of antiepileptic drug use during pregnancy.  What side effects may I notice from receiving this medicine?  Side effects that you should report to your doctor or health care professional as soon as possible:    allergic reactions like skin rash, itching or hives, swelling of the face, lips, or tongue    decreased sweating and/or rise in body temperature    depression    difficulty breathing, fast or irregular breathing patterns    difficulty speaking    difficulty walking or controlling muscle movements    hearing impairment    redness, blistering, peeling or loosening of the skin, including inside the mouth    tingling, pain or numbness in the hands or feet    unusual bleeding or bruising    unusually weak or tired    worsening of mood, thoughts or actions of suicide or dying  Side effects that usually do not require medical attention (report to your doctor or health care professional if they continue or are bothersome):    altered taste    back pain, joint or muscle aches and pains    diarrhea, or constipation    headache    loss of appetite    nausea    stomach upset, indigestion    tremors  This list may not describe all possible side effects. Call your doctor for medical advice about side effects. You may report side effects to FDA at  1-800-FDA-1088.  Where should I keep my medicine?  Keep out of the reach of children.  Store at room temperature between 15 and 30 degrees C (59 and 86 degrees F) in a tightly closed container. Protect from moisture. Throw away any unused medicine after the expiration date.  NOTE:This sheet is a summary. It may not cover all possible information. If you have questions about this medicine, talk to your doctor, pharmacist, or health care provider. Copyright  2016 Gold Standard                Follow-ups after your visit        Additional Services     ACUPUNCTURE REFERRAL       Your provider has referred you to: per patient's choice for chronic pain and chronic headaches  Please be aware that coverage of these services is subject to the terms and limitations of your health insurance plan.  Call member services at your health plan with any benefit or coverage questions.      Please bring the following with you to your appointment:    (1) Any X-Rays, CTs or MRIs which have been performed.  Contact the facility where they were done to arrange for  prior to your scheduled appointment.  (2) List of current medications   (3) This referral request   (4) Any documents/labs given to you for this referral                  Your next 10 appointments already scheduled     Jan 18, 2019  9:40 AM CST   (Arrive by 9:25 AM)   RETURN INFLAMMATORY BOWEL DISEASE with Kory Reese PA-C   [facility] ([facility])    [address] [place] [id number] [phone]            Feb 18, 2019  8:40 AM CST   (Arrive by 8:25 AM)   RETURN INFLAMMATORY BOWEL DISEASE with Francisco Grigsby MD   [facility] ([facility])    [address] [place] [id number] [phone]              Who to contact     Please call your clinic at [phone] to:    Ask questions about your health    Make or  cancel appointments    Discuss your medicines    Learn about your test results    Speak to your doctor            Additional Information About Your Visit        [facility] Information     [email or web address] gives you secure access to your electronic health record. If you see a primary care provider, you can also send messages to your care team and make appointments. If you have questions, please call your primary care clinic.  If you do not have a primary care provider, please call [phone] and they will assist you.      [email or web address] is an electronic gateway that provides easy, online access to your medical records. With [email or web address], you can request a clinic appointment, read your test results, renew a prescription or communicate with your care team.     To access your existing account, please contact your [facility] or call [phone] for assistance.        Care EveryWhere ID     This is your Care EveryWhere ID. This could be used by other organizations to access your [facility] medical records  [id number]        Your Vitals Were     Pulse Pulse Oximetry                71 99%           Blood Pressure from Last 3 Encounters:   10/25/18 134/85   10/17/18 144/88   06/06/18 (!) 174/100    Weight from Last 3 Encounters:   10/17/18 103.7 kg (228 lb 11.2 oz)   06/06/18 99.1 kg (218 lb 8 oz)   04/16/18 99.1 kg (218 lb 6.4 oz)              We Performed the Following     ACUPUNCTURE REFERRAL          Today's Medication Changes          These changes are accurate as of 10/25/18  1:22 PM.  If you have any questions, ask your nurse or doctor.               Start taking these medicines.        Dose/Directions    topiramate 25 MG tablet   Commonly known as:  TOPAMAX   Used for:  Chronic mixed headache syndrome   Started by:  Lluvia Shah APRN CNP        Take 25 mg at bedtime for one week, then 50 mg at bedtime   Quantity:  60 tablet   Refills:  3            Where to get your medicines      These  medications were sent to [id number] - [place], [place] - [address] [place] [address]  [address] [place] [id number]     Phone:  [phone]     topiramate 25 MG tablet                Primary Care Provider Office Phone # Fax #    Seb Lafleur [phone] [address] [facility] [address]        Equal Access to Services     MARTHA LUIS : Hadii aad ku hadasho Soomaali, waaxda luqadaha, qaybta kaalmada adeegyada, waxay idiin hayaan adeeg kharash la'aan . So [facility] [phone].    ATENCIÓN: Si habla español, tiene a boyd disposición servicios gratuitos de asistencia lingüística. Herbert al [phone].    We comply with applicable federal civil rights laws and [place] laws. We do not discriminate on the basis of race, color, national origin, age, disability, sex, sexual orientation, or gender identity.            Thank you!     Thank you for choosing [facility]  for your care. Our goal is always to provide you with excellent care. Hearing back from our patients is one way we can continue to improve our services. Please take a few minutes to complete the written survey that you may receive in the mail after your visit with us. Thank you!             Your Updated Medication List - Protect others around you: Learn how to safely use, store and throw away your medicines at www.disposemymeds.org.          This list is accurate as of 10/25/18  1:22 PM.  Always use your most recent med list.                   Brand Name Dispense Instructions for use Diagnosis    atenolol 25 MG tablet    TENORMIN    90 tablet    Take 1 tablet (25 mg) by mouth daily    Benign essential hypertension       baclofen 10 MG tablet    LIORESAL    90 tablet    Take 1 tablet (10 mg) by mouth 2 times daily    Chronic low back pain, unspecified back pain laterality, with sciatica presence unspecified       benzoyl peroxide 10 % topical liquid     227 g    Use daily as directed when showering and be careful about  bleaching of towels    Open comedone       cholecalciferol 5000 units Caps capsule    vitamin D3     TAKE ONE CAPSULE BY MOUTH ONCE DAILY        escitalopram 20 MG tablet    LEXAPRO    90 tablet    Take 1 tablet (20 mg) by mouth daily    Major depressive disorder, single episode, moderate (H)       FOLIC ACID PO      Take 400 mcg by mouth daily        gabapentin 300 MG capsule    NEURONTIN    30 capsule    Take 1 capsule (300 mg) by mouth At Bedtime    Chronic low back pain, unspecified back pain laterality, with sciatica presence unspecified       lidocaine 4 % Crea cream    LMX4    1 Tube    Apply 3 times daily as needed for pain    Contusion of rib on left side, sequela       losartan 100 MG tablet    COZAAR    90 tablet    Take 1 tablet (100 mg) by mouth daily    Benign essential hypertension       * mesalamine 1.2 g EC tablet    LIALDA    120 tablet    Take 4 tablets (4,800 mg) by mouth every morning    Ulcerative pancolitis without complication (H)       * mesalamine 4 g enema    ROWASA    30 Bottle    Place 1 enema (4 g) rectally At Bedtime    Ulcerative pancolitis without complication (H)       NARCAN nasal spray   Generic drug:  naloxone           * oxyCODONE-acetaminophen  MG per tablet    PERCOCET     Take 1 tablet by mouth 2 times daily    Ulcerative pancolitis without complication (H)       * oxyCODONE-acetaminophen 5-325 MG per tablet    PERCOCET    120 tablet    Take 1 tablet by mouth every 6 hours as needed for moderate to severe pain    Chronic low back pain, unspecified back pain laterality, with sciatica presence unspecified, Chronic neck pain, Chronic narcotic use       sulfaSALAzine 500 MG tablet    AZULFIDINE    180 tablet    Take 2 tablets (1,000 mg) by mouth 2 times daily    Ulcerative pancolitis without complication (H)       topiramate 25 MG tablet    TOPAMAX    60 tablet    Take 25 mg at bedtime for one week, then 50 mg at bedtime    Chronic mixed headache syndrome       * TYLENOL PO  "     Take 1 tablet by mouth every 4 hours as needed for mild pain or fever '\" I take 2500 to 3500 mg of tylenol daily.\"        * acetaminophen 500 MG tablet    TYLENOL    1 Bottle    Take 1-2 tablets (500-1,000 mg) by mouth every 6 hours as needed for mild pain    Migraine without aura and without status migrainosus, not intractable       * Notice:  This list has 6 medication(s) that are the same as other medications prescribed for you. Read the directions carefully, and ask your doctor or other care provider to review them with you.      "

## 2018-11-27 DIAGNOSIS — G43.009 MIGRAINE WITHOUT AURA AND WITHOUT STATUS MIGRAINOSUS, NOT INTRACTABLE: ICD-10-CM

## 2018-11-27 RX ORDER — ACETAMINOPHEN 500 MG
500-1000 TABLET ORAL EVERY 6 HOURS PRN
Qty: 120 TABLET | Refills: 6 | Status: SHIPPED | OUTPATIENT
Start: 2018-11-27 | End: 2019-10-18

## 2018-12-27 DIAGNOSIS — R79.89 ELEVATED LFTS: ICD-10-CM

## 2018-12-27 LAB
ALBUMIN SERPL-MCNC: 3.9 G/DL (ref 3.4–5)
ALP SERPL-CCNC: 73 U/L (ref 40–150)
ALT SERPL W P-5'-P-CCNC: 53 U/L (ref 0–70)
AST SERPL W P-5'-P-CCNC: 23 U/L (ref 0–45)
BILIRUB DIRECT SERPL-MCNC: 0.1 MG/DL (ref 0–0.2)
BILIRUB SERPL-MCNC: 0.5 MG/DL (ref 0.2–1.3)
PROT SERPL-MCNC: 7.3 G/DL (ref 6.8–8.8)

## 2019-01-14 ENCOUNTER — TELEPHONE (OUTPATIENT)
Dept: GASTROENTEROLOGY | Facility: CLINIC | Age: 54
End: 2019-01-14

## 2019-02-11 ENCOUNTER — TELEPHONE (OUTPATIENT)
Dept: GASTROENTEROLOGY | Facility: CLINIC | Age: 54
End: 2019-02-11

## 2019-02-15 ENCOUNTER — TELEPHONE (OUTPATIENT)
Dept: GASTROENTEROLOGY | Facility: CLINIC | Age: 54
End: 2019-02-15

## 2019-02-15 NOTE — TELEPHONE ENCOUNTER
Health Call Center    Phone Message    May a detailed message be left on voicemail: yes    Reason for Call: Other: PT:  Pt wants to nehemias an appt with Dr. Grigsby but only on a Tues or Thurs with specific time frames between 10AM to 1PM. Pt explained he needs the specific dates and times due to custody shared and having his Daughter with him. Pt was wondering if he can see a mid level provider instead, but is specifically requesting to not see Kory Reese PA-C due to a horrible experience he had. Dr. Grigsby only has Monday appts which do not work with pt's nehemias, please review and f/u with pt on what his options are. Pt also wanted Writer to relay that he is still concerned about the inflammation but Is not bleeding     Action Taken: Message routed to:  [facility] ([facility] ROZINA

## 2019-02-15 NOTE — TELEPHONE ENCOUNTER
Called patient and left message stating that he can call my direct number to discuss his appointment date and time.

## 2019-03-11 ENCOUNTER — HOSPITAL ENCOUNTER (OUTPATIENT)
Facility: CLINIC | Age: 54
Setting detail: OBSERVATION
Discharge: HOME | End: 2019-03-12
Attending: EMERGENCY MEDICINE | Admitting: EMERGENCY MEDICINE
Payer: COMMERCIAL

## 2019-03-11 ENCOUNTER — APPOINTMENT (OUTPATIENT)
Dept: GENERAL RADIOLOGY | Facility: CLINIC | Age: 54
End: 2019-03-11
Attending: EMERGENCY MEDICINE
Payer: COMMERCIAL

## 2019-03-11 DIAGNOSIS — R07.89 OTHER CHEST PAIN: ICD-10-CM

## 2019-03-11 DIAGNOSIS — I10 BENIGN ESSENTIAL HYPERTENSION: ICD-10-CM

## 2019-03-11 DIAGNOSIS — G89.29 OTHER CHRONIC PAIN: ICD-10-CM

## 2019-03-11 DIAGNOSIS — K51.90 MILD CHRONIC ULCERATIVE COLITIS WITHOUT COMPLICATION (H): ICD-10-CM

## 2019-03-11 LAB
ALBUMIN SERPL-MCNC: 4 G/DL (ref 3.4–5)
ALP SERPL-CCNC: 62 U/L (ref 40–150)
ALT SERPL W P-5'-P-CCNC: 31 U/L (ref 0–70)
ANION GAP SERPL CALCULATED.3IONS-SCNC: 9 MMOL/L (ref 3–14)
APTT PPP: 30 SEC (ref 22–37)
AST SERPL W P-5'-P-CCNC: 20 U/L (ref 0–45)
BASOPHILS # BLD AUTO: 0.1 10E9/L (ref 0–0.2)
BASOPHILS NFR BLD AUTO: 0.6 %
BILIRUB SERPL-MCNC: 0.6 MG/DL (ref 0.2–1.3)
BUN SERPL-MCNC: 10 MG/DL (ref 7–30)
CALCIUM SERPL-MCNC: 9.2 MG/DL (ref 8.5–10.1)
CHLORIDE SERPL-SCNC: 104 MMOL/L (ref 94–109)
CO2 SERPL-SCNC: 27 MMOL/L (ref 20–32)
CREAT SERPL-MCNC: 0.97 MG/DL (ref 0.66–1.25)
D DIMER PPP FEU-MCNC: <0.3 UG/ML FEU (ref 0–0.5)
DIFFERENTIAL METHOD BLD: NORMAL
EOSINOPHIL # BLD AUTO: 0.1 10E9/L (ref 0–0.7)
EOSINOPHIL NFR BLD AUTO: 1.1 %
ERYTHROCYTE [DISTWIDTH] IN BLOOD BY AUTOMATED COUNT: 12.2 % (ref 10–15)
GFR SERPL CREATININE-BSD FRML MDRD: 88 ML/MIN/{1.73_M2}
GLUCOSE SERPL-MCNC: 129 MG/DL (ref 70–99)
HCT VFR BLD AUTO: 43 % (ref 40–53)
HGB BLD-MCNC: 14.5 G/DL (ref 13.3–17.7)
IMM GRANULOCYTES # BLD: 0 10E9/L (ref 0–0.4)
IMM GRANULOCYTES NFR BLD: 0.1 %
INR PPP: 1.04 (ref 0.86–1.14)
LYMPHOCYTES # BLD AUTO: 1.6 10E9/L (ref 0.8–5.3)
LYMPHOCYTES NFR BLD AUTO: 20.3 %
MCH RBC QN AUTO: 30 PG (ref 26.5–33)
MCHC RBC AUTO-ENTMCNC: 33.7 G/DL (ref 31.5–36.5)
MCV RBC AUTO: 89 FL (ref 78–100)
MONOCYTES # BLD AUTO: 0.5 10E9/L (ref 0–1.3)
MONOCYTES NFR BLD AUTO: 5.8 %
NEUTROPHILS # BLD AUTO: 5.8 10E9/L (ref 1.6–8.3)
NEUTROPHILS NFR BLD AUTO: 72.1 %
NRBC # BLD AUTO: 0 10*3/UL
NRBC BLD AUTO-RTO: 0 /100
PLATELET # BLD AUTO: 239 10E9/L (ref 150–450)
POTASSIUM SERPL-SCNC: 4.2 MMOL/L (ref 3.4–5.3)
PROT SERPL-MCNC: 7.5 G/DL (ref 6.8–8.8)
RBC # BLD AUTO: 4.84 10E12/L (ref 4.4–5.9)
SODIUM SERPL-SCNC: 141 MMOL/L (ref 133–144)
TROPONIN I SERPL-MCNC: <0.015 UG/L (ref 0–0.04)
TSH SERPL DL<=0.005 MIU/L-ACNC: 0.7 MU/L (ref 0.4–4)
WBC # BLD AUTO: 8 10E9/L (ref 4–11)

## 2019-03-11 PROCEDURE — 85730 THROMBOPLASTIN TIME PARTIAL: CPT | Performed by: EMERGENCY MEDICINE

## 2019-03-11 PROCEDURE — 84484 ASSAY OF TROPONIN QUANT: CPT | Performed by: EMERGENCY MEDICINE

## 2019-03-11 PROCEDURE — 71046 X-RAY EXAM CHEST 2 VIEWS: CPT

## 2019-03-11 PROCEDURE — 80053 COMPREHEN METABOLIC PANEL: CPT | Performed by: EMERGENCY MEDICINE

## 2019-03-11 PROCEDURE — 93005 ELECTROCARDIOGRAM TRACING: CPT | Performed by: EMERGENCY MEDICINE

## 2019-03-11 PROCEDURE — 85379 FIBRIN DEGRADATION QUANT: CPT | Performed by: EMERGENCY MEDICINE

## 2019-03-11 PROCEDURE — 99285 EMERGENCY DEPT VISIT HI MDM: CPT | Mod: 25 | Performed by: EMERGENCY MEDICINE

## 2019-03-11 PROCEDURE — G0378 HOSPITAL OBSERVATION PER HR: HCPCS

## 2019-03-11 PROCEDURE — 25000132 ZZH RX MED GY IP 250 OP 250 PS 637: Performed by: EMERGENCY MEDICINE

## 2019-03-11 PROCEDURE — 84443 ASSAY THYROID STIM HORMONE: CPT | Performed by: EMERGENCY MEDICINE

## 2019-03-11 PROCEDURE — 85025 COMPLETE CBC W/AUTO DIFF WBC: CPT | Performed by: EMERGENCY MEDICINE

## 2019-03-11 PROCEDURE — 25000132 ZZH RX MED GY IP 250 OP 250 PS 637: Performed by: PHYSICIAN ASSISTANT

## 2019-03-11 PROCEDURE — 85610 PROTHROMBIN TIME: CPT | Performed by: EMERGENCY MEDICINE

## 2019-03-11 PROCEDURE — 99220 ZZC INITIAL OBSERVATION CARE,LEVL III: CPT | Mod: 25 | Performed by: PHYSICIAN ASSISTANT

## 2019-03-11 PROCEDURE — 93010 ELECTROCARDIOGRAM REPORT: CPT | Mod: Z6 | Performed by: EMERGENCY MEDICINE

## 2019-03-11 RX ORDER — OXYCODONE HCL 10 MG/1
10 TABLET, FILM COATED, EXTENDED RELEASE ORAL EVERY 12 HOURS
Status: DISCONTINUED | OUTPATIENT
Start: 2019-03-12 | End: 2019-03-12 | Stop reason: HOSPADM

## 2019-03-11 RX ORDER — BACLOFEN 10 MG/1
10 TABLET ORAL 2 TIMES DAILY
Status: DISCONTINUED | OUTPATIENT
Start: 2019-03-11 | End: 2019-03-12 | Stop reason: HOSPADM

## 2019-03-11 RX ORDER — NITROGLYCERIN 0.4 MG/1
0.4 TABLET SUBLINGUAL EVERY 5 MIN PRN
Status: DISCONTINUED | OUTPATIENT
Start: 2019-03-11 | End: 2019-03-12 | Stop reason: HOSPADM

## 2019-03-11 RX ORDER — ASPIRIN 325 MG
325 TABLET ORAL ONCE
Status: DISCONTINUED | OUTPATIENT
Start: 2019-03-11 | End: 2019-03-12 | Stop reason: CLARIF

## 2019-03-11 RX ORDER — MESALAMINE 4 G/60ML
4 SUSPENSION RECTAL AT BEDTIME
Status: DISCONTINUED | OUTPATIENT
Start: 2019-03-11 | End: 2019-03-12 | Stop reason: HOSPADM

## 2019-03-11 RX ORDER — NALOXONE HYDROCHLORIDE 0.4 MG/ML
.1-.4 INJECTION, SOLUTION INTRAMUSCULAR; INTRAVENOUS; SUBCUTANEOUS
Status: DISCONTINUED | OUTPATIENT
Start: 2019-03-11 | End: 2019-03-12 | Stop reason: HOSPADM

## 2019-03-11 RX ORDER — OXYCODONE HCL 10 MG/1
10 TABLET, FILM COATED, EXTENDED RELEASE ORAL ONCE
Status: COMPLETED | OUTPATIENT
Start: 2019-03-11 | End: 2019-03-11

## 2019-03-11 RX ORDER — OXYCODONE HCL 10 MG/1
10 TABLET, FILM COATED, EXTENDED RELEASE ORAL EVERY 12 HOURS
COMMUNITY

## 2019-03-11 RX ORDER — GABAPENTIN 300 MG/1
300 CAPSULE ORAL AT BEDTIME
Status: DISCONTINUED | OUTPATIENT
Start: 2019-03-11 | End: 2019-03-12 | Stop reason: HOSPADM

## 2019-03-11 RX ORDER — OXYCODONE AND ACETAMINOPHEN 5; 325 MG/1; MG/1
2 TABLET ORAL ONCE
Status: COMPLETED | OUTPATIENT
Start: 2019-03-11 | End: 2019-03-11

## 2019-03-11 RX ORDER — ALUMINA, MAGNESIA, AND SIMETHICONE 2400; 2400; 240 MG/30ML; MG/30ML; MG/30ML
30 SUSPENSION ORAL EVERY 4 HOURS PRN
Status: DISCONTINUED | OUTPATIENT
Start: 2019-03-11 | End: 2019-03-12 | Stop reason: HOSPADM

## 2019-03-11 RX ORDER — MESALAMINE 1.2 G/1
4800 TABLET, DELAYED RELEASE ORAL EVERY MORNING
Status: DISCONTINUED | OUTPATIENT
Start: 2019-03-12 | End: 2019-03-12 | Stop reason: HOSPADM

## 2019-03-11 RX ORDER — OXYCODONE AND ACETAMINOPHEN 5; 325 MG/1; MG/1
1 TABLET ORAL EVERY 6 HOURS PRN
Status: DISCONTINUED | OUTPATIENT
Start: 2019-03-11 | End: 2019-03-12 | Stop reason: HOSPADM

## 2019-03-11 RX ORDER — LOSARTAN POTASSIUM 100 MG/1
100 TABLET ORAL DAILY
Status: DISCONTINUED | OUTPATIENT
Start: 2019-03-11 | End: 2019-03-12 | Stop reason: HOSPADM

## 2019-03-11 RX ORDER — ACETAMINOPHEN 500 MG
500-1000 TABLET ORAL EVERY 6 HOURS PRN
Status: DISCONTINUED | OUTPATIENT
Start: 2019-03-11 | End: 2019-03-12 | Stop reason: HOSPADM

## 2019-03-11 RX ORDER — LIDOCAINE 40 MG/G
CREAM TOPICAL
Status: DISCONTINUED | OUTPATIENT
Start: 2019-03-11 | End: 2019-03-12 | Stop reason: HOSPADM

## 2019-03-11 RX ADMIN — LOSARTAN POTASSIUM 100 MG: 100 TABLET ORAL at 21:04

## 2019-03-11 RX ADMIN — MESALAMINE 4 G: 4 ENEMA RECTAL at 22:07

## 2019-03-11 RX ADMIN — GABAPENTIN 300 MG: 300 CAPSULE ORAL at 22:07

## 2019-03-11 RX ADMIN — BACLOFEN 10 MG: 10 TABLET ORAL at 22:06

## 2019-03-11 RX ADMIN — OXYCODONE HYDROCHLORIDE 10 MG: 10 TABLET, FILM COATED, EXTENDED RELEASE ORAL at 19:23

## 2019-03-11 RX ADMIN — OXYCODONE HYDROCHLORIDE AND ACETAMINOPHEN 2 TABLET: 5; 325 TABLET ORAL at 19:23

## 2019-03-11 ASSESSMENT — ENCOUNTER SYMPTOMS
COLOR CHANGE: 0
NECK STIFFNESS: 0
PALPITATIONS: 1
ARTHRALGIAS: 0
ABDOMINAL PAIN: 0
DIFFICULTY URINATING: 0
SHORTNESS OF BREATH: 0
CONFUSION: 0
HEADACHES: 0
FEVER: 0
EYE REDNESS: 0

## 2019-03-11 ASSESSMENT — PAIN DESCRIPTION - DESCRIPTORS: DESCRIPTORS: HEADACHE

## 2019-03-11 NOTE — ED NOTES
Bed: [facility]  Expected date: 3/11/19  Expected time:   Means of arrival: Ambulance  Comments:  [address]  53 y M  Chest Pain, nitroglycerin x 2  Yellow

## 2019-03-11 NOTE — ED PROVIDER NOTES
History     Chief Complaint   Patient presents with     Chest Pain     The history is provided by the patient and medical records.     Parrish Muir is a 53 year old male with a medical history significant for HTN, anxiety, and depression who presents to the Emergency Department for evaluation of left-sided chest pain and palpitation. He describes his chest pain and aches and denies chest pressure. He reports of sweating. He notes that he has not been compliant with his prescribed medications for the past 2-3 weeks.     Past Medical History:   Diagnosis Date     Hypertension        Past Surgical History:   Procedure Laterality Date     COLONOSCOPY WITH CO2 INSUFFLATION N/A 11/6/2015    Procedure: COLONOSCOPY WITH CO2 INSUFFLATION;  Surgeon: Francisco Grigsby MD;  Location: [facility]       Family History   Problem Relation Age of Onset     Alzheimer Disease Mother      Skin Cancer No family hx of      Melanoma No family hx of        Social History     Tobacco Use     Smoking status: Never Smoker     Smokeless tobacco: Never Used   Substance Use Topics     Alcohol use: No       Current Facility-Administered Medications   Medication     aspirin (ASA) tablet 325 mg     Current Outpatient Medications   Medication     acetaminophen (TYLENOL) 500 MG tablet     atenolol (TENORMIN) 25 MG tablet     baclofen (LIORESAL) 10 MG tablet     benzoyl peroxide 10 % topical liquid     cholecalciferol (VITAMIN D3) 5000 units CAPS capsule     escitalopram (LEXAPRO) 20 MG tablet     FOLIC ACID PO     gabapentin (NEURONTIN) 300 MG capsule     lidocaine (LMX4) 4 % CREA cream     losartan (COZAAR) 100 MG tablet     mesalamine (LIALDA) 1.2 g EC tablet     mesalamine (ROWASA) 4 g enema     NARCAN nasal spray     oxyCODONE-acetaminophen (PERCOCET)  MG per tablet     oxyCODONE-acetaminophen (PERCOCET) 5-325 MG per tablet     sulfaSALAzine (AZULFIDINE) 500 MG tablet     topiramate (TOPAMAX) 25 MG tablet        Allergies   Allergen  Reactions     Morphine Sulfate      Severe hallucinations       I have reviewed the Medications, Allergies, Past Medical and Surgical History, and Social History in the Epic system.    Review of Systems   Constitutional: Negative for fever.        Positive for sweating   HENT: Negative for congestion.    Eyes: Negative for redness.   Respiratory: Negative for shortness of breath.    Cardiovascular: Positive for chest pain (Aches) and palpitations.        Negative for chest pressure   Gastrointestinal: Negative for abdominal pain.   Genitourinary: Negative for difficulty urinating.   Musculoskeletal: Negative for arthralgias and neck stiffness.   Skin: Negative for color change.   Neurological: Negative for headaches.   Psychiatric/Behavioral: Negative for confusion.       Physical Exam   BP: (!) 126/93  Pulse: 76  Temp: 99  F (37.2  C)  Weight: 103.6 kg (228 lb 6.4 oz)  SpO2: 99 %      Physical Exam   Constitutional: He is oriented to person, place, and time. No distress.   HENT:   Head: Normocephalic and atraumatic.   Eyes: Conjunctivae and EOM are normal. Pupils are equal, round, and reactive to light.   Neck: Normal range of motion. Neck supple.   Cardiovascular: Normal rate, regular rhythm and normal heart sounds. Exam reveals no gallop and no friction rub.   No murmur heard.  Pulmonary/Chest: Effort normal and breath sounds normal. No respiratory distress.   Abdominal: Soft. There is no tenderness.   Musculoskeletal: He exhibits no edema or tenderness.   Neurological: He is alert and oriented to person, place, and time. No cranial nerve deficit.   Skin: Skin is warm.   Psychiatric: He has a normal mood and affect. His behavior is normal. Judgment and thought content normal.   Nursing note and vitals reviewed.      ED Course   4:16 PM  The patient was seen and examined by Hernan Hernandez MD in [facility].   Based on history physical examination and evaluation I plan to get a serum 1 set of cardiac enzymes I will  also get a d-dimer to rule out any acute pathology and then the patient will be admitted to observation for chest pain rule out.     Procedures  None       EKG Interpretation:      Interpreted by Hernan Hernandez  Time reviewed: 1605 hrs.  Symptoms at time of EKG: Resolved chest pain  Rhythm: normal sinus   Rate: 81 bpm  Axis: Normal  Ectopy: none  Conduction: normal  ST Segments/ T Waves: Non-specific ST-T wave changes  Q Waves: inferior leads   Clinical Impression: no acute changes                Critical Care time:  none             Labs Ordered and Resulted from Time of ED Arrival Up to the Time of Departure from the ED - No data to display         Assessments & Plan (with Medical Decision Making)   This patient is a 53-year-old male with a past medical history of hypertension anxiety depression who turns into the emergency department for evaluation of left-sided chest pain.  Currently chest pain has resolved however the EKG does show some evidence of nonspecific ST wave abnormalities, eighth in the history and physical examination, I plan to admit for chest pain to observation office. I have reviewed the nursing notes.    Patient will be admitted to observation knobs under Dr. Amanda for further evaluation and treatment    I have reviewed the findings, diagnosis, plan and need for follow up with the patient.       Medication List      There are no discharge medications for this visit.         Final diagnoses:   Other chest pain     I, Jordan Robles, am serving as a trained medical scribe to document services personally performed by Hernan Hernandez MD, based on the provider's statements to me.      IHernan MD, was physically present and have reviewed and verified the accuracy of this note documented by Jordan Robles.     3/11/2019   [facility], EMERGENCY DEPARTMENT     Hernan Hernandez MD  03/11/19 [id number]

## 2019-03-11 NOTE — ED TRIAGE NOTES
BIBA d/t chest pain that started last night, sent from a clinic in [place], BP elevated at clinic, clinic gave 324 mg ASA and 1 nitroglycerin, BP went down to 120 systolic, chest pain went from 7/10 to 5/10, EMS gave 2 more nitroglycerin, other VSS

## 2019-03-12 ENCOUNTER — APPOINTMENT (OUTPATIENT)
Dept: CARDIOLOGY | Facility: CLINIC | Age: 54
End: 2019-03-12
Payer: COMMERCIAL

## 2019-03-12 VITALS
DIASTOLIC BLOOD PRESSURE: 87 MMHG | OXYGEN SATURATION: 99 % | SYSTOLIC BLOOD PRESSURE: 138 MMHG | BODY MASS INDEX: 33.73 KG/M2 | TEMPERATURE: 98.8 F | HEART RATE: 58 BPM | RESPIRATION RATE: 16 BRPM | WEIGHT: 228.4 LBS

## 2019-03-12 LAB
TROPONIN I SERPL-MCNC: <0.015 UG/L (ref 0–0.04)
TROPONIN I SERPL-MCNC: <0.015 UG/L (ref 0–0.04)

## 2019-03-12 PROCEDURE — 93321 DOPPLER ECHO F-UP/LMTD STD: CPT | Mod: 26 | Performed by: INTERNAL MEDICINE

## 2019-03-12 PROCEDURE — 93018 CV STRESS TEST I&R ONLY: CPT | Performed by: INTERNAL MEDICINE

## 2019-03-12 PROCEDURE — 25000132 ZZH RX MED GY IP 250 OP 250 PS 637: Performed by: PHYSICIAN ASSISTANT

## 2019-03-12 PROCEDURE — 84484 ASSAY OF TROPONIN QUANT: CPT | Mod: 91 | Performed by: PHYSICIAN ASSISTANT

## 2019-03-12 PROCEDURE — 25500064 ZZH RX 255 OP 636: Performed by: EMERGENCY MEDICINE

## 2019-03-12 PROCEDURE — 93016 CV STRESS TEST SUPVJ ONLY: CPT | Performed by: INTERNAL MEDICINE

## 2019-03-12 PROCEDURE — 36415 COLL VENOUS BLD VENIPUNCTURE: CPT | Performed by: PHYSICIAN ASSISTANT

## 2019-03-12 PROCEDURE — 93325 DOPPLER ECHO COLOR FLOW MAPG: CPT | Mod: 26 | Performed by: INTERNAL MEDICINE

## 2019-03-12 PROCEDURE — 93350 STRESS TTE ONLY: CPT | Mod: 26 | Performed by: INTERNAL MEDICINE

## 2019-03-12 PROCEDURE — 99217 ZZC OBSERVATION CARE DISCHARGE: CPT | Mod: Z6 | Performed by: NURSE PRACTITIONER

## 2019-03-12 PROCEDURE — 40000264 ECHO STRESS ECHOCARDIOGRAM

## 2019-03-12 PROCEDURE — G0378 HOSPITAL OBSERVATION PER HR: HCPCS

## 2019-03-12 RX ORDER — LOSARTAN POTASSIUM 100 MG/1
100 TABLET ORAL DAILY
Qty: 90 TABLET | Refills: 1 | Status: SHIPPED | OUTPATIENT
Start: 2019-03-12 | End: 2019-05-07

## 2019-03-12 RX ADMIN — LOSARTAN POTASSIUM 100 MG: 100 TABLET ORAL at 08:38

## 2019-03-12 RX ADMIN — BACLOFEN 10 MG: 10 TABLET ORAL at 08:38

## 2019-03-12 RX ADMIN — MESALAMINE 4800 MG: 1.2 TABLET, DELAYED RELEASE ORAL at 08:39

## 2019-03-12 RX ADMIN — PERFLUTREN 5 ML: 6.52 INJECTION, SUSPENSION INTRAVENOUS at 09:59

## 2019-03-12 RX ADMIN — ACETAMINOPHEN 500 MG: 500 TABLET, FILM COATED ORAL at 00:04

## 2019-03-12 RX ADMIN — OXYCODONE HYDROCHLORIDE 10 MG: 10 TABLET, FILM COATED, EXTENDED RELEASE ORAL at 06:19

## 2019-03-12 RX ADMIN — OXYCODONE HYDROCHLORIDE AND ACETAMINOPHEN 1 TABLET: 5; 325 TABLET ORAL at 11:51

## 2019-03-12 NOTE — PROGRESS NOTES
The patient was seen and examined by me and the case was discussed with the MOHSEN. We are in agreement with the assessment and plan.  This is a 53-year-old gentleman with a history of hypertension and 3 weeks of medication noncompliance.  Today he had episode of palpitations is not clear whether it was tachycardia or the perception of a heavy heartbeat.  He does not describe any irregularity he had chest discomfort associated with this, he did not check his pulse.  He thinks it may have started after drinking coffee with caffeine he says he normally drinks non-caffeinated.  No documented cardiac history and is currently asymptomatic he has a negative EKG and negative troponin.    Past Medical History:   Diagnosis Date     Hypertension      Social History     Socioeconomic History     Marital status:      Spouse name: Not on file     Number of children: Not on file     Years of education: Not on file     Highest education level: Not on file   Occupational History     Not on file   Social Needs     Financial resource strain: Not on file     Food insecurity:     Worry: Not on file     Inability: Not on file     Transportation needs:     Medical: Not on file     Non-medical: Not on file   Tobacco Use     Smoking status: Never Smoker     Smokeless tobacco: Never Used   Substance and Sexual Activity     Alcohol use: No     Drug use: No     Sexual activity: Not on file   Lifestyle     Physical activity:     Days per week: Not on file     Minutes per session: Not on file     Stress: Not on file   Relationships     Social connections:     Talks on phone: Not on file     Gets together: Not on file     Attends Religion service: Not on file     Active member of club or organization: Not on file     Attends meetings of clubs or organizations: Not on file     Relationship status: Not on file     Intimate partner violence:     Fear of current or ex partner: Not on file     Emotionally abused: Not on file     Physically  abused: Not on file     Forced sexual activity: Not on file   Other Topics Concern     Parent/sibling w/ CABG, MI or angioplasty before 65F 55M? Not Asked   Social History Narrative    Lives in [place], 4 children.  Lives with daughter.  On disability.   vetran     Physical examination:  General: Awake alert no distress  Cardiac: Regular rate and rhythm no murmurs rubs or gallops not tachycardic  Respiratory: Lungs are clear no rales  Abdomen: Soft and nontender    Assessment and plan: 53-year-old gentleman with palpitations and chest pain.  Will do serial troponins and likely exercise stress test tomorrow.  I think this is a low risk patient and likely will have a negative workup and be discharged home tomorrow.  I am not convinced that he needs a Holter or other type monitor on discharge.  Of course we will monitor for any dysrhythmia tonight

## 2019-03-12 NOTE — PROGRESS NOTES
Patient discharged home. PIV removed. Patient has all belongings, understands and agrees with discharge instructions. Declined transport and will ambulate independently to lobby.

## 2019-03-12 NOTE — DISCHARGE SUMMARY
"[facility], [place]  Hospitalist Discharge Summary       Date of Admission:  3/11/2019  Date of Discharge:  3/12/2019  Discharging Provider: JULIETH Shepard CNP  Discharge Team: Emergency Department Observation Unit    Discharge Diagnoses   Chest pressure and hypertension    Follow-ups Needed After Discharge   Follow-up Appointments     Adult [facility] Follow-up and recommended labs and tests      Follow up with PCP in one week, return to the ER if having chest pain,   syncope, shortness of breath or fever greater than 101F.     Appointments on [place] and/or [facility] (with [facility] or [facility]   provider or service). Call [phone] if you haven't heard regarding   these appointments within 7 days of discharge.         {Additional follow-up instructions/to-do's for PCP    :Hospital follow up.     Unresulted Labs Ordered in the Past 30 Days of this Admission     No orders found for last 61 day(s).      These results will be followed up by PCP    Discharge Disposition   Discharged to home  Condition at discharge: Stable    Hospital Course   Parrish Muir is a 53 year old male admitted on 3/11/2019. He has a history of HTN, Ulcerative Colitis, depression who presented to the ED from the clinic via EMS with chest pain.      1. Chest Discomfort/Ache: last night felt his heart pounding hard in his chest but no chest pain. Yesterday  ~9am again with heart pounding hard in his chest causing him chest discomfort. Sternal chest ache lasted from ~ 11:30am until ~ 6:30pm. Associated diaphoresis, lightheadedness, nausea. Had a cup of coffee yesterday morning while he visited with his ex wife which \"pushed him over the edge\" with worsening symptoms. Troponin I negative x 3. EKG with Qwave in II, III, and aVF; 2005 had Qwave present in only III; otherwise no acute ischemic findings. CXR negative. BP this am 137/89. Recommend patient continue on Losartan.  Stress test completed today and " was normal. Patient was restarted on his Losartan and recommended patient follow up with PCP for further blood pressure management/      Chronic Medical Problems:  # Ulcerative Colitis: - Continue with PTA Mesalamine      # Chronic Back Pain: - Continue with PTA Oxycodone, Oxycontin, Gabapentin           Consultations This Hospital Stay   None    Code Status   Full Code    Time Spent on this Encounter   I, Anna Morales, personally saw the patient today and spent less than or equal to 30 minutes discharging this patient.       JULIETH Shepard [facility] [place]  ______________________________________________________________________    Physical Exam   Vital Signs: Temp: 98.8  F (37.1  C) Temp src: Oral BP: 138/87 Pulse: 58 Heart Rate: 79 Resp: 16 SpO2: 99 % O2 Device: None (Room air)    Weight: 228 lbs 6.4 oz  Constitutional: healthy, alert and no distress   Head: Normocephalic. No masses, lesions, tenderness or abnormalities   Neck: Neck supple. No adenopathy. Thyroid symmetric, normal size,, Carotids without bruits.   ENT: ENT exam normal, no neck nodes or sinus tenderness   Cardiovascular: RRR. No murmurs, clicks gallops or rub   Respiratory: . Good diaphragmatic excursion. Lungs clear   Gastrointestinal: Abdomen soft,. BS normal. No masses, organomegaly.   : Deferred   Musculoskeletal: extremities normal- no gross deformities noted, gait normal and normal muscle tone   Skin: no suspicious lesions or rashes   Neurologic: Gait normal. Reflexes normal and symmetric. Sensation grossly WNL.   Psychiatric: mentation appears normal and affect normal/bright   Hematologic/Lymphatic/Immunologic: normal ant/post cervical, axillary, supraclavicular and inguinal              Primary Care Physician   Seb Lafleur    Immunizations       Discharge Orders      Reason for your hospital stay    Chest pressure and palpitations.     Adult [facility] Follow-up and recommended labs and  tests    Follow up with PCP in one week, return to the ER if having chest pain, syncope, shortness of breath or fever greater than 101F.     Appointments on [place] and/or [facility] (with [facility] or [facility] provider or service). Call [phone] if you haven't heard regarding these appointments within 7 days of discharge.     Activity    Your activity upon discharge: activity as tolerated     When to contact your care team    Please go to your nearest emergency room If you were to have a change in the type of chest pain i.e more severe, lasting longer or radiating to your shoulder, arm, neck, jaw or back, shortness of breath or increased pain with breathing, coughing up blood, feel dizzy or lightheaded, or notice swelling in one leg.     Discharge Instructions    The chest pain you came into the emergency room for does not appear to be cardiac chest pain. Your heart enzymes were normal which tells us there was no damage to the heart muscle. Your stress was normal.     I recommend continuing your home medications and your high blood pressure medication. It will be very important to follow up with your primary care doctor early next week.     Continue to drink plenty of fluids.     Diet    Follow this diet upon discharge: Regular       Significant Results and Procedures   Results for orders placed or performed during the hospital encounter of 03/11/19   XR Chest 2 Views    Narrative    EXAMINATION: XR CHEST 2 VW on 3/11/2019 5:21 PM.    INDICATION: Chest pain.    COMPARISON: Chest x-ray dated 11/30/2005.    FINDINGS: PA and lateral upright views of the chest.     Trachea is midline. Cardiomediastinal silhouette is within normal  limits. Pulmonary vasculature is distinct. No appreciable pneumothorax  or pleural effusions. No focal airspace opacities. The visualized  upper abdomen appears unremarkable.      Impression    IMPRESSION: No acute cardiac or pulmonary findings.    I have personally reviewed the  examination and initial interpretation  and I agree with the findings.    AUGUSTINE SNYDER MD       Discharge Medications   Current Discharge Medication List      CONTINUE these medications which have CHANGED    Details   losartan (COZAAR) 100 MG tablet Take 1 tablet (100 mg) by mouth daily  Qty: 90 tablet, Refills: 1    Associated Diagnoses: Benign essential hypertension         CONTINUE these medications which have NOT CHANGED    Details   oxyCODONE (OXYCONTIN) 10 MG 12 hr tablet Take 10 mg by mouth every 12 hours      acetaminophen (TYLENOL) 500 MG tablet Take 1-2 tablets (500-1,000 mg) by mouth every 6 hours as needed for mild pain  Qty: 120 tablet, Refills: 6    Associated Diagnoses: Migraine without aura and without status migrainosus, not intractable      atenolol (TENORMIN) 25 MG tablet Take 1 tablet (25 mg) by mouth daily  Qty: 90 tablet, Refills: 1    Associated Diagnoses: Benign essential hypertension      baclofen (LIORESAL) 10 MG tablet Take 1 tablet (10 mg) by mouth 2 times daily  Qty: 90 tablet, Refills: 1    Associated Diagnoses: Chronic low back pain, unspecified back pain laterality, with sciatica presence unspecified      benzoyl peroxide 10 % topical liquid Use daily as directed when showering and be careful about bleaching of towels  Qty: 227 g, Refills: 3    Associated Diagnoses: Open comedone      cholecalciferol (VITAMIN D3) 5000 units CAPS capsule TAKE ONE CAPSULE BY MOUTH ONCE DAILY  Refills: 3      FOLIC ACID PO Take 400 mcg by mouth daily      gabapentin (NEURONTIN) 300 MG capsule Take 1 capsule (300 mg) by mouth At Bedtime  Qty: 30 capsule, Refills: 1    Associated Diagnoses: Chronic low back pain, unspecified back pain laterality, with sciatica presence unspecified      lidocaine (LMX4) 4 % CREA cream Apply 3 times daily as needed for pain  Qty: 1 Tube, Refills: 1    Associated Diagnoses: Contusion of rib on left side, sequela      mesalamine (LIALDA) 1.2 g EC tablet Take 4 tablets  (4,800 mg) by mouth every morning  Qty: 120 tablet, Refills: 3    Associated Diagnoses: Ulcerative pancolitis without complication (H)      mesalamine (ROWASA) 4 g enema Place 1 enema (4 g) rectally At Bedtime  Qty: 30 Bottle, Refills: 3    Associated Diagnoses: Ulcerative pancolitis without complication (H)      NARCAN nasal spray       oxyCODONE-acetaminophen (PERCOCET)  MG per tablet Take 1 tablet by mouth 2 times daily    Associated Diagnoses: Ulcerative pancolitis without complication (H)      oxyCODONE-acetaminophen (PERCOCET) 5-325 MG per tablet Take 1 tablet by mouth every 6 hours as needed for moderate to severe pain  Qty: 120 tablet, Refills: 0    Associated Diagnoses: Chronic low back pain, unspecified back pain laterality, with sciatica presence unspecified; Chronic neck pain; Chronic narcotic use      sulfaSALAzine (AZULFIDINE) 500 MG tablet Take 2 tablets (1,000 mg) by mouth 2 times daily  Qty: 180 tablet, Refills: 0    Associated Diagnoses: Ulcerative pancolitis without complication (H)           Allergies   Allergies   Allergen Reactions     Morphine Sulfate      Severe hallucinations

## 2019-03-12 NOTE — PROGRESS NOTES
The patient was seen and examined by me and the case was discussed with the MOHSEN. We are in agreement with the assessment and plan.    Past Medical History:   Diagnosis Date     Hypertension        Social History     Socioeconomic History     Marital status:      Spouse name: Not on file     Number of children: Not on file     Years of education: Not on file     Highest education level: Not on file   Occupational History     Not on file   Social Needs     Financial resource strain: Not on file     Food insecurity:     Worry: Not on file     Inability: Not on file     Transportation needs:     Medical: Not on file     Non-medical: Not on file   Tobacco Use     Smoking status: Never Smoker     Smokeless tobacco: Never Used   Substance and Sexual Activity     Alcohol use: No     Drug use: No     Sexual activity: Not on file   Lifestyle     Physical activity:     Days per week: Not on file     Minutes per session: Not on file     Stress: Not on file   Relationships     Social connections:     Talks on phone: Not on file     Gets together: Not on file     Attends [employer] service: Not on file     Active member of club or organization: Not on file     Attends meetings of clubs or organizations: Not on file     Relationship status: Not on file     Intimate partner violence:     Fear of current or ex partner: Not on file     Emotionally abused: Not on file     Physically abused: Not on file     Forced sexual activity: Not on file   Other Topics Concern     Parent/sibling w/ CABG, MI or angioplasty before 65F 55M? Not Asked   Social History Narrative    Lives in [place], 4 children.  Lives with daughter.  On disability.   vetran     This is a 53-year-old male admitted for a single episode of palpitations.  There was no dyspnea and minimal if any chest pain.  He thinks it was brought on by coffee with caffeine.  No evidence for tacky dysrhythmia his EKG was unremarkable his troponins have been negative.  I  saw him last evening and he is down getting a stress echocardiogram this morning which I expect will be normal and he can be discharged home.

## 2019-03-12 NOTE — PLAN OF CARE
Observation Goals:    - Serial troponins and stress test complete: In process.  - Seen and cleared by consultant if applicable: In process.  - Adequate pain control on oral analgesia: In process, pt taking PO pain medications and tolerating.  - Vital signs normal or at patient baseline: In process, slightly hypertensive and intermittently bradycardic.   - Safe disposition plan has been identified: In process.

## 2019-03-12 NOTE — H&P
"[facility], [place]    History and Physical - ED Observation Service       Date of Admission:  3/11/2019    Assessment & Plan   Parrish Muir is a 53 year old male admitted on 3/11/2019. He has a history of HTN, Ulcerative Colitis, depression who presented to the ED from the clinic via EMS with chest pain.     1. Chest Discomfort/Ache: last night felt his heart pounding hard in his chest but no chest pain. This morning awoke ~9am again with heart pounding hard in his chest causing him chest discomfort. Sternal chest ache lasted from ~ 11:30am until ~ 6:30pm. Associated diaphoresis, lightheadedness, nausea. No SOB, paresthesias, vomiting. Headache since this morning. Usually on losartan but discontinued about 3-4 weeks ago as he felt like it was not working and he was moving into a different class of hypertension from a guide that he has. Had a cup of coffee this morning while he visited with his ex wife which \"pushed him over the edge\" with worsening symptoms. Denies LE edema, h/o DVT/PE, long distance travel, heartburn, reflux, constipation, bloated, gassy. In ED, HR 50's-70's, 's-150's/70's-100's, RR 18, SaO2 99% on RA, Temp 99. Normal CMP, TSH, CBC. Troponin I negative x 1. EKG with Qwave in II, III, and aVF; 2005 had Qwave present in only III; otherwise no acute ischemic findings. CXR negative. In ED was given his PM doses of Oxycontin and Oxycodone. Received ASA 324mg po and 1 dose of NTG in the clinic at [facility] prior to transfer and 2 more NTG by EMS en route. Risk Factors include male > 40, HTN.  - Serial troponins q4h x 2 more  - Continuous telemetry  - Exercise Stress Test in the morning  - Nitro PRN  - ASA 81mg daily  - Clear liquid diet after midnight. NPO at 6am     Chronic Medical Problems:  # Ulcerative Colitis: - Continue with PTA Mesalamine     # Chronic Pain: - Continue with PTA Oxycodone, Oxycontin, Gabapentin        Diet: Combination Diet Low Saturated " Fat Na <2400mg Diet, No Caffeine Diet    DVT Prophylaxis: Low Risk/Ambulatory with no VTE prophylaxis indicated  Conley Catheter: not present  Code Status: Full Code      Disposition Plan   Expected discharge: Tomorrow, recommended to prior living arrangement once ACS work up negative.  Entered: LUL Gonzalez 03/11/2019, 8:15 PM     The patient's care was discussed with the Attending Physician, Dr. Ortiz.    LUL Gonzalez  [facility], [place]  Ascom: [id number]  Please see sticky note for cross cover information  ______________________________________________________________________    Chief Complaint   Heart was pounding in my chest    History is obtained from the patient    History of Present Illness   Parrish Muir is a 53 year old male with a history of HTN, Ulcerative Colitis, depression who presented to the ED from the clinic via EMS with chest pain. States last night he felt his heart pounding hard in his chest but no chest pain. This morning he awoke ~9am again with his heart pounding hard in his chest causing him chest discomfort. Sternal chest ache lasted from about 11:30am until about 6:30pm. Reports associated diaphoresis, lightheadedness, nausea. Denies SOB, paresthesias, vomiting. States he has also had a headache since this morning. He is usually on losartan and discontinued about 3-4 weeks ago as he felt like it was not working and he was moving into a different class of hypertension from a guide that he has. States he had a cup of coffee while he visited with his ex wife this morning which pushed him over the edge with worsening heart pounding and chest discomfort. He had cut off caffeine from his diet. Off note he states about 2 weeks ago he fell in the snow hitting his chest on the bumper of a car and has some soreness in his left upper chest wall. He was seen at a clinic in [place] where he was given ASA 324mg and 1 NTG and sent to the ED  by ambulance. en route he was given NTG x 2. Denies any LE edema, h/o DVT/PE, long distance travel, heartburn, reflux, constipation, bloated, gassy. No family history of CAD. History of very rare social use of tobacco. ETOH use is rare on just occasion. Denies any illicit drug use.     In the ED, HR 50's-70's, 's-150's/70's-100's, RR 18, SaO2 99% on RA, Temp 99. Labs show normal CMP, TSH, CBC. Troponin I negative x 1. EKG with Qwave in II, III, and aVF; 2005 had Qwave present in only III; otherwise no acute ischemic findings. CXR negative. In the ED the patient was given his PM doses of Oxycontin and Oxycodone. He received ASA 324mg po and 1 dose of NTG in the clinic at [facility] prior to transfer and 2 more NTG en route by EMS. He is being admitted to the Observation Unit for ACS r/o.       Review of Systems    The 10 point Review of Systems is negative other than noted in the HPI or here.     Past Medical History    I have reviewed this patient's medical history and updated it with pertinent information if needed.   Past Medical History:   Diagnosis Date     Hypertension        Past Surgical History   I have reviewed this patient's surgical history and updated it with pertinent information if needed.  Past Surgical History:   Procedure Laterality Date     COLONOSCOPY WITH CO2 INSUFFLATION N/A 11/6/2015    Procedure: COLONOSCOPY WITH CO2 INSUFFLATION;  Surgeon: Francisco Grigsby MD;  Location: [facility] OR       Social History   I have reviewed this patient's social history and updated it with pertinent information if needed.  Social History     Tobacco Use     Smoking status: Never Smoker     Smokeless tobacco: Never Used   Substance Use Topics     Alcohol use: No     Drug use: No       Family History   I have reviewed this patient's family history and updated it with pertinent information if needed.   Family History   Problem Relation Age of Onset     Alzheimer Disease Mother      Skin Cancer No family hx of       Melanoma No family hx of        Prior to Admission Medications   Prior to Admission Medications   Prescriptions Last Dose Informant Patient Reported? Taking?   FOLIC ACID PO   Yes No   Sig: Take 400 mcg by mouth daily   NARCAN nasal spray   Yes No   acetaminophen (TYLENOL) 500 MG tablet   No No   Sig: Take 1-2 tablets (500-1,000 mg) by mouth every 6 hours as needed for mild pain   atenolol (TENORMIN) 25 MG tablet   No No   Sig: Take 1 tablet (25 mg) by mouth daily   baclofen (LIORESAL) 10 MG tablet   No No   Sig: Take 1 tablet (10 mg) by mouth 2 times daily   benzoyl peroxide 10 % topical liquid   No No   Sig: Use daily as directed when showering and be careful about bleaching of towels   cholecalciferol (VITAMIN D3) 5000 units CAPS capsule   Yes No   Sig: TAKE ONE CAPSULE BY MOUTH ONCE DAILY   gabapentin (NEURONTIN) 300 MG capsule   No No   Sig: Take 1 capsule (300 mg) by mouth At Bedtime   lidocaine (LMX4) 4 % CREA cream   No No   Sig: Apply 3 times daily as needed for pain   Patient not taking: Reported on 10/17/2018   losartan (COZAAR) 100 MG tablet   No No   Sig: Take 1 tablet (100 mg) by mouth daily   mesalamine (LIALDA) 1.2 g EC tablet   No No   Sig: Take 4 tablets (4,800 mg) by mouth every morning   mesalamine (ROWASA) 4 g enema   No No   Sig: Place 1 enema (4 g) rectally At Bedtime   oxyCODONE (OXYCONTIN) 10 MG 12 hr tablet   Yes Yes   Sig: Take 10 mg by mouth every 12 hours   oxyCODONE-acetaminophen (PERCOCET)  MG per tablet   Yes No   Sig: Take 1 tablet by mouth 2 times daily   oxyCODONE-acetaminophen (PERCOCET) 5-325 MG per tablet   No No   Sig: Take 1 tablet by mouth every 6 hours as needed for moderate to severe pain   sulfaSALAzine (AZULFIDINE) 500 MG tablet   No No   Sig: Take 2 tablets (1,000 mg) by mouth 2 times daily      Facility-Administered Medications: None     Allergies   Allergies   Allergen Reactions     Morphine Sulfate      Severe hallucinations       Physical Exam   Vital Signs:  Temp: 98.5  F (36.9  C) Temp src: Oral BP: (!) 156/102 Pulse: 58   Resp: 18 SpO2: 99 % O2 Device: None (Room air)    Weight: 228 lbs 6.4 oz    Constitutional: awake, alert, cooperative, no apparent distress, and appears stated age  Eyes: Lids and lashes normal, pupils equal, round and reactive to light, extra ocular muscles intact, sclera clear, conjunctiva normal  ENT: Normocephalic, without obvious abnormality, atraumatic, sinuses nontender on palpation, external ears without lesions, oral pharynx with moist mucous membranes, tonsils without erythema or exudates, gums normal and good dentition.  Hematologic / Lymphatic: no cervical lymphadenopathy  Respiratory: No increased work of breathing, good air exchange, clear to auscultation bilaterally, no crackles or wheezing  Cardiovascular: Normal apical impulse, regular rate and rhythm, normal S1 and S2, no S3 or S4, and no murmur noted. Chest wall tenderness in left mid sternal border.  GI: No scars, normal bowel sounds, soft, non-distended, non-tender, no masses palpated, no hepatosplenomegally  Skin: no bruising or bleeding  Musculoskeletal: There is no redness, warmth, or swelling of the joints.  Full range of motion noted.  Motor strength is 5 out of 5 all extremities bilaterally.  Tone is normal.  Neurologic: Awake, alert, oriented to name, place and time.  Cranial nerves II-XII are grossly intact.  Motor is 5 out of 5 bilaterally.  Cerebellar finger to nose, heel to shin intact.  Sensory is intact.  Babinski down going, Romberg negative, and gait is normal.  Neuropsychiatric: General: calm and normal eye contact    Data   Data reviewed today: I reviewed all medications, new labs and imaging results over the last 24 hours.    Recent Labs   Lab 03/11/19  1630   WBC 8.0   HGB 14.5   MCV 89      INR 1.04      POTASSIUM 4.2   CHLORIDE 104   CO2 27   BUN 10   CR 0.97   ANIONGAP 9   TABATHA 9.2   *   ALBUMIN 4.0   PROTTOTAL 7.5   BILITOTAL 0.6    ALKPHOS 62   ALT 31   AST 20   TROPI <0.015     Most Recent 3 CBC's:  Recent Labs   Lab Test 03/11/19  1630 10/17/18  1022 02/26/18  0939   WBC 8.0 6.2 5.8   HGB 14.5 14.2 14.7   MCV 89 90 89    225 234     Most Recent 3 BMP's:  Recent Labs   Lab Test 03/11/19  1630 06/06/18  1539 02/26/18  0939    140 138   POTASSIUM 4.2 4.1 4.2   CHLORIDE 104 103 104   CO2 27 30 29   BUN 10 12 14   CR 0.97 1.07 1.01   ANIONGAP 9 6 6   TABATHA 9.2 9.3 9.3   * 111* 118*     Most Recent 2 LFT's:  Recent Labs   Lab Test 03/11/19  1630 12/27/18  1319   AST 20 23   ALT 31 53   ALKPHOS 62 73   BILITOTAL 0.6 0.5     Most Recent 3 Troponin's:  Recent Labs   Lab Test 03/11/19  1630   TROPI <0.015     Most Recent Cholesterol Panel:  Recent Labs   Lab Test 06/06/18  1540   CHOL 175   *   HDL 49   TRIG 95     Most Recent TSH and T4:  Recent Labs   Lab Test 03/11/19  1630   TSH 0.70     Most Recent Hemoglobin A1c:No lab results found.  Most Recent Urinalysis:  Recent Labs   Lab Test 04/16/18  1230   COLOR Yellow   APPEARANCE Clear   URINEGLC Negative   URINEBILI Negative   URINEKETONE Negative   SG 1.017   UBLD Negative   URINEPH 6.0   PROTEIN Negative   NITRITE Negative   LEUKEST Negative   RBCU 1   WBCU <1     Recent Results (from the past 24 hour(s))   XR Chest 2 Views    Narrative    EXAMINATION: XR CHEST 2 VW on 3/11/2019 5:21 PM.    INDICATION: Chest pain.    COMPARISON: Chest x-ray dated 11/30/2005.    FINDINGS: PA and lateral upright views of the chest.     Trachea is midline. Cardiomediastinal silhouette is within normal  limits. Pulmonary vasculature is distinct. No appreciable pneumothorax  or pleural effusions. No focal airspace opacities. The visualized  upper abdomen appears unremarkable.      Impression    IMPRESSION: No acute cardiac or pulmonary findings.    I have personally reviewed the examination and initial interpretation  and I agree with the findings.    AUGUSTINE SNYDER MD

## 2019-03-12 NOTE — PLAN OF CARE
Outpatient/Observation goals to be met before discharge home:     - Serial troponins and stress test complete: no  - Seen and cleared by consultant if applicable : no  - Adequate pain control on oral analgesia : yes  - Vital signs normal or at patient baseline : yes  - Safe disposition plan has been identified : yes

## 2019-03-12 NOTE — PLAN OF CARE
Outpatient/Observation goals to be met before discharge home:  - Serial troponins and stress test complete - no  - Seen and cleared by consultant if applicable - no  - Adequate pain control on oral analgesia - yes  - Vital signs normal or at patient baseline - no, BP elevated  - Safe disposition plan has been identified - not yet    Nurse to notify provider when observation goals have been met and patient is ready for discharge.

## 2019-03-12 NOTE — PLAN OF CARE
Observation Goals:     - Serial troponins and stress test complete: In process, pending results.  - Seen and cleared by consultant if applicable: In process.  - Adequate pain control on oral analgesia: In process, pt taking PO pain medications and tolerating.  - Vital signs normal or at patient baseline: Met, pt can be hypertensive.   - Safe disposition plan has been identified: In process.

## 2019-05-03 DIAGNOSIS — K51.00 ULCERATIVE PANCOLITIS WITHOUT COMPLICATION (H): ICD-10-CM

## 2019-05-05 NOTE — TELEPHONE ENCOUNTER
mesalamine (LIALDA) 1.2 g EC tablet  Last Written Prescription Date: 10/17/18 Last Fill Quantity: 120   # refills: 3  Last Office Visit : 10/17/18  Future Office visit:  None    Creatinine   Date Value Ref Range Status   03/11/2019 0.97 0.66 - 1.25 mg/dL Final         Routing refill request to provider for review/approval because:  Per  Protocol  No refills after 12 months

## 2019-05-07 ENCOUNTER — TELEPHONE (OUTPATIENT)
Dept: INTERNAL MEDICINE | Facility: CLINIC | Age: 54
End: 2019-05-07

## 2019-05-07 DIAGNOSIS — I10 BENIGN ESSENTIAL HYPERTENSION: ICD-10-CM

## 2019-05-07 RX ORDER — MESALAMINE 1.2 G/1
4800 TABLET, DELAYED RELEASE ORAL EVERY MORNING
Qty: 120 TABLET | Refills: 3 | Status: SHIPPED | OUTPATIENT
Start: 2019-05-07 | End: 2019-08-15

## 2019-05-07 RX ORDER — LOSARTAN POTASSIUM 100 MG/1
100 TABLET ORAL DAILY
Qty: 90 TABLET | Refills: 0 | Status: SHIPPED | OUTPATIENT
Start: 2019-05-07 | End: 2019-08-15

## 2019-05-07 NOTE — TELEPHONE ENCOUNTER
Pt called and denies SOB, chest pain and B/P 156/100 HR 73 . Pt states he has run out of Losartan for 2 weeks.

## 2019-06-11 ENCOUNTER — TELEPHONE (OUTPATIENT)
Dept: INTERNAL MEDICINE | Facility: CLINIC | Age: 54
End: 2019-06-11

## 2019-06-11 NOTE — TELEPHONE ENCOUNTER
Health Call Center    Phone Message    May a detailed message be left on voicemail: yes     Reason for Call: Form or Letter   Type or form/letter needing completion: pt would like to know if someone can look for the form that Dr Dial filled out in 2015. He would like a copy sent to him.  Provider: Seb Lafleur MD  Date form needed: ASAP  Once completed: Mail form to Name: when you find a copy on his permanent file please mail to pt address address on file, at Address: [address] [place] [address]      Action Taken: Message routed to:  [facility] Primary Care

## 2019-06-11 NOTE — TELEPHONE ENCOUNTER
need HUD form from 10/29/15. Verified with him on the phone for the correct form to be mailed. Placed in the mail to go to his  [place] address  Viky Tyson on 6/11/2019 at 1:59 PM

## 2019-06-18 ENCOUNTER — TELEPHONE (OUTPATIENT)
Dept: GASTROENTEROLOGY | Facility: CLINIC | Age: 54
End: 2019-06-18

## 2019-06-18 NOTE — TELEPHONE ENCOUNTER
[facility] Call Center    Phone Message    May a detailed message be left on voicemail: yes    Reason for Call: Other: Parrish is calling to inform the clinic that his housing management company is going to be faxing a form for Dr. Grigsby to fill out.   Parrish wants to just make sure that this is filled out stating that he does in fact for medical reasons need to have 2 toilets in his home due to his loose bowel.   Please call him with any questions.         Action Taken: Message routed to:  [facility] gastro

## 2019-06-25 ENCOUNTER — OFFICE VISIT (OUTPATIENT)
Dept: INTERNAL MEDICINE | Facility: CLINIC | Age: 54
End: 2019-06-25
Payer: COMMERCIAL

## 2019-06-25 ENCOUNTER — TELEPHONE (OUTPATIENT)
Dept: INTERNAL MEDICINE | Facility: CLINIC | Age: 54
End: 2019-06-25

## 2019-06-25 VITALS
HEART RATE: 72 BPM | OXYGEN SATURATION: 98 % | WEIGHT: 209.2 LBS | BODY MASS INDEX: 30.89 KG/M2 | DIASTOLIC BLOOD PRESSURE: 79 MMHG | SYSTOLIC BLOOD PRESSURE: 122 MMHG

## 2019-06-25 DIAGNOSIS — I10 ESSENTIAL HYPERTENSION: ICD-10-CM

## 2019-06-25 DIAGNOSIS — N52.9 ERECTILE DYSFUNCTION, UNSPECIFIED ERECTILE DYSFUNCTION TYPE: Primary | ICD-10-CM

## 2019-06-25 DIAGNOSIS — N52.9 ERECTILE DYSFUNCTION, UNSPECIFIED ERECTILE DYSFUNCTION TYPE: ICD-10-CM

## 2019-06-25 DIAGNOSIS — N40.1 BENIGN PROSTATIC HYPERPLASIA WITH LOWER URINARY TRACT SYMPTOMS, SYMPTOM DETAILS UNSPECIFIED: ICD-10-CM

## 2019-06-25 LAB
HBA1C MFR BLD: 6.1 % (ref 0–5.6)
PSA SERPL-ACNC: 0.35 UG/L (ref 0–4)
TSH SERPL DL<=0.005 MIU/L-ACNC: 1.87 MU/L (ref 0.4–4)

## 2019-06-25 RX ORDER — TADALAFIL 5 MG/1
5 TABLET ORAL EVERY 24 HOURS
Qty: 30 TABLET | Refills: 3 | Status: SHIPPED | OUTPATIENT
Start: 2019-06-25 | End: 2019-07-31

## 2019-06-25 RX ORDER — TAMSULOSIN HYDROCHLORIDE 0.4 MG/1
0.4 CAPSULE ORAL DAILY
Qty: 90 CAPSULE | Refills: 3 | Status: CANCELLED | OUTPATIENT
Start: 2019-06-25

## 2019-06-25 ASSESSMENT — PATIENT HEALTH QUESTIONNAIRE - PHQ9
SUM OF ALL RESPONSES TO PHQ QUESTIONS 1-9: 5
SUM OF ALL RESPONSES TO PHQ QUESTIONS 1-9: 5

## 2019-06-25 ASSESSMENT — PAIN SCALES - GENERAL: PAINLEVEL: MODERATE PAIN (5)

## 2019-06-25 NOTE — NURSING NOTE
"Chief Complaint   Patient presents with     Hypertension     Pt is here for BP management        Vital signs:      BP: 109/67 Pulse: 72     SpO2: 98 %       Weight: 94.9 kg (209 lb 3.2 oz)  Estimated body mass index is 30.89 kg/m  as calculated from the following:    Height as of 10/17/18: 1.753 m (5' 9\").    Weight as of this encounter: 94.9 kg (209 lb 3.2 oz).      SMA Emil at 11:53 AM on 6/25/2019  "

## 2019-06-25 NOTE — PROGRESS NOTES
PRIMARY CARE CENTER       SUBJECTIVE:  Parrish Muir is a 54 year old male with a PMHx of IBD, HTN, and depression who comes in for evaluation fo HTN.    Patient reports diagnosed with HTN last year and has been on losartan for 1 year. Briefly on atenolol however this was not renewed. No issues with taking his losartan daily and has not noticed side effects. Denies light headedness or dizziness. No cough.     Gets his BP checked at [facility] and has had elevated BP readings 140s/100s and is concerned his BP is not well controlled.     Also concerned about erectile dysfunction. Reports history of enlarged prostate. Was prescribed cialis and this helped with his LUTS and ED in the past. Followed with urology back in 2010/2011 in [place] and they were monitoring his PSA at the time however has been off cialis for quite some time. Up at least twice if not three times a night due to intermittent having to go. No pain with urination. Feels like does not empty bladder completely.  No blood in the urine. No morning erections.     Medications and allergies reviewed by me today.     ROS:   Constitutional, neuro, ENT, endocrine, pulmonary, cardiac, gastrointestinal, genitourinary, musculoskeletal, integument and psychiatric systems are negative, except as otherwise noted.    OBJECTIVE:    /67   Pulse 72   Wt 94.9 kg (209 lb 3.2 oz)   SpO2 98%   BMI 30.89 kg/m     Wt Readings from Last 1 Encounters:   06/25/19 94.9 kg (209 lb 3.2 oz)       GENERAL APPEARANCE: healthy, alert and no distress     RESP: lungs clear to auscultation - no rales, rhonchi or wheezes     CV: regular rates and rhythm, normal S1 S2, no S3 or S4 and no murmur, click or rub     ABDOMEN:  soft, nontender, no HSM or masses and bowel sounds normal     SKIN: no suspicious lesions or rashes     NEURO: Normal strength and tone, sensory exam grossly normal, mentation intact and speech normal     PSYCH: mentation appears  normal, affect normal/bright     LYMPHATICS: No cervical adenopathy     ASSESSMENT/PLAN:    Parrish was seen today for hypertension.    Diagnoses and all orders for this visit:    Erectile dysfunction  Patient reports history of ED previously on cialis in the past. Reports cialis worked for him in the past. Will trial this for both BPH and ED.   -     tadalafil (CIALIS) 5 MG tablet; Take 1 tablet (5 mg) by mouth every 24 hours  -     Hemoglobin A1c; Future  -     TSH with free T4 reflex; Future    Benign prostatic hyperplasia with lower urinary tract symptoms  Reports incomplete bladder emptying and nocturnal awakenings. Denies dysuria or hematuria. Given previously evaluated with diagnosis of BPH in past will defer UA.    -     PSA screen; Future  -     cialis as above  -     Urology consult for further workup    Essential hypertension  Repeat BP reading in clinic patient remains normotensive on his losartan. Will prescribe BP monitor and instructed patient to record BP daily. Will review readings to see if titration of losartan necessary.   -     order for DME; Equipment being ordered: Digital home blood pressure monitor kit    Pt should return to clinic for f/u with me in 2-3 weeks.     Ayse Stone MD PhD  Internal Medicine, PGY-2  [phone]   Jun 25, 2019    Pt was seen and plan of care discussed with Dr. Lewis.     Pt was seen and examined with Dr. Stone.  I agree with her documentation as noted above.    My additional comments: None    Azeb Lewis MD

## 2019-06-25 NOTE — PATIENT INSTRUCTIONS
[facility]         Internal Medicine Resident                   Continuity Clinic    Who We Are    Resident Continuity Clinic is a part of the [facility] Primary Care Clinic.  Resident physicians see patients independently and establish a relationship with them over the course of their three-year residency program.  As with the Primary Care Clinic, our Resident Continuity Clinic models a group practice.  If your doctor is not available, you will be able to see another resident physician.  At the end of a resident s training, patients will be transitioned to a new resident physician for ongoing care.     We treat patients with a wide array of medical needs from routine physicals, to acute illnesses, to diabetes and blood pressure management, to complex medical illness.  What is a Resident Physician?    Resident physicians hold medical degrees and are doctors. They are training to become specialists in Internal Medicine. They work under the supervision of board-certified faculty physicians.  Expectations for Your Care    We strive to provide accessible, quality care at all times.    In order to provide this care, it is best to see your primary care resident doctor consistently rather switching between providers.  In the event you do see another physician, you should schedule a follow-up visit with your usual primary care doctor.    If you are transitioning your care from another clinic, it is helpful to have your records available for your doctor to review.    We do not prescribe controlled substances, such as ADD medications or narcotic pain medications, on your first visit.  We will review your health records and concerns prior to devising a treatment plan with you in order to provide the best care.      Clinic Services     Extended clinic hours; patient  to help navigate your visit;  parking; laboratory and imaging services with evening and weekend hours    Multiple medical and  surgical specialties in one building    Complementary services, including Nutrition, Integrative Medicine, Pharmacy consultations, Mental and Behavioral Health, Sports Medicine and Physical Therapy    Thank You    We would like to thank you for choosing the [facility] Internal Medicine Resident Continuity Clinic for your primary care. You are making a priceless contribution to the training of the next generation of health care practitioners.     Contact us at [phone] for appointments or questions.    Resident Clinic Hours are Tuesdays and Thursdays, 7:30am-5:00pm    Residents   Maximus Britton MD   (Male)   Tamiko Cox MD  (Female)  Gloria Mauricio MD   (Female)   Charlotte Richard MD   (Female)   Willian Shen MD  (Male)   Adam Mcmahon MD  (Male)   Ayse Stone MD    (Female)   Benji Harp MD  (Male)   Greg Torres MD  (Male)    Sharon Dunbar MD  (Female)   Brandon Heredia MD  (Male)   Hector Warner MD  (Female)   Elsy Denis MD    (Female)   Henry Gomez MD  (Male)   Hadley Rodriguez MD  (Male)   Adam Holt MD (Male)   Kem Mayer MD  (Male)   Pao Xiong MD (Female)    Annie Fraser MD (Female)   Joshua Salas MD  (Male)   Natalia Batista MD    (Female)   Isabela Yang MD  (Female)    Supervising Physicians   MD Deidra Lund MD Briar Duffy, MD James Langland, MD Mary Logeais, MD Tanya Melnik, MD Charles Moldow, MD Heather Thompson Buum, MD Kathleen Watson, MD

## 2019-06-25 NOTE — TELEPHONE ENCOUNTER
Prior Authorization Retail Medication Request    Medication/Dose: Tadalafil 5 mg  ICD code (if different than what is on RX):  N52.9  Previously Tried and Failed:  unknown  Rationale:  none    Insurance Name:    Insurance ID:  [id number]

## 2019-06-25 NOTE — TELEPHONE ENCOUNTER
Central Prior Authorization Team   Phone: [phone]    PA Initiation    Medication: Tadalafil 5 mg  Insurance Company: [employer] - Phone [phone] Fax [phone]  Pharmacy Filling the Rx: [id number] - [place], [place] - [address] [place] [address]  Filling Pharmacy Phone: [phone]  Filling Pharmacy Fax:    Start Date: 6/25/2019

## 2019-06-26 NOTE — TELEPHONE ENCOUNTER
PRIOR AUTHORIZATION DENIED    Medication: Tadalafil 5 mg - P/A DENIED    Denial Date: 6/25/2019    Denial Rational:         Appeal Information:

## 2019-07-01 NOTE — TELEPHONE ENCOUNTER
Health Call Center    Phone Message    May a detailed message be left on voicemail: no    Reason for Call: Form or Letter ** Patient stated he does not trust that his landlord sent over the fax. Requested Francisco Nixon MD or his care team write a letter addressed to his landlord stating his need for a two toilet unit. Please advise.  Type or form/letter needing completion: Accomodation letter for landlord   Provider: Francisco Nixon MD   Date form needed: ASAP  Once completed: Mail form to Name: [employer], ATTN: Thais Fox, at Address: [address] [place] [id number]      Action Taken: Message routed to:  [facility] Gastroenterology

## 2019-07-02 ENCOUNTER — MYC MEDICAL ADVICE (OUTPATIENT)
Dept: INTERNAL MEDICINE | Facility: CLINIC | Age: 54
End: 2019-07-02

## 2019-07-02 NOTE — TELEPHONE ENCOUNTER
Faxed DME to [facility] again for BP kit. Responded to patient via [employer]. Viky Tyson on 7/2/2019 at 2:12 PM

## 2019-07-15 NOTE — TELEPHONE ENCOUNTER
The patient needs the blood pressure device order to be sent to [facility] [id number] [place] [address] Phone: [phone] Fax [phone] please call patient to address concerns thank you

## 2019-07-16 NOTE — TELEPHONE ENCOUNTER
I called the patient and let him know that I faxed the order for pressure cuff to the requested location, [facility]. I let him know that the Generic Cialis was not authorized by his insurance and was forwarded to Dr. Lafleur for further review. He had no further questions or concerns.     Renetta Trevino, EMT at 10:29 AM on 7/16/2019.

## 2019-07-23 ENCOUNTER — PATIENT OUTREACH (OUTPATIENT)
Dept: GASTROENTEROLOGY | Facility: CLINIC | Age: 54
End: 2019-07-23

## 2019-07-23 ENCOUNTER — TELEPHONE (OUTPATIENT)
Dept: GASTROENTEROLOGY | Facility: CLINIC | Age: 54
End: 2019-07-23

## 2019-07-23 NOTE — PROGRESS NOTES
Parrish called and confirmed that he would be able to make an appointment on August 9 at 10:40 AM.  With Dr. Grigsby

## 2019-07-23 NOTE — PROGRESS NOTES
Called patient left douglas offered him an appointment on August 2, 2019 as Dr. Grigsby will not be in August in the office on Monday the day he is scheduled.  Left my direct number for patient to call back and confirm appointment date and time

## 2019-07-30 NOTE — TELEPHONE ENCOUNTER
Health Call Center    Phone Message    May a detailed message be left on voicemail: yes    Reason for Call: Other: Patient called to see where this is at in the 2nd appeal process that was sent to Dr Culver earlier in July. Please follow up with the patient with a status.     Action Taken: Message routed to:  [facility] PCC

## 2019-07-31 RX ORDER — SILDENAFIL 50 MG/1
50 TABLET, FILM COATED ORAL DAILY PRN
Qty: 10 TABLET | Refills: 1 | Status: SHIPPED | OUTPATIENT
Start: 2019-07-31 | End: 2023-08-21

## 2019-07-31 NOTE — TELEPHONE ENCOUNTER
Please let patient know I have prescribed Viagra instead to see if this is covered for his erectile dysfunction.   Zay

## 2019-07-31 NOTE — TELEPHONE ENCOUNTER
MEDICAL RECORDS REQUEST   [facility]  Urology Clinic  [address] [place] [address]  PHONE: [phone]  Fax: [phone]        FUTURE VISIT INFORMATION                                                   Parrish Muir : 1965 scheduled for future visit at [facility]    APPOINTMENT INFORMATION:    Date: 19 9:20AM     Provider:  Blake Hoffman MD     Reason for Visit/Diagnosis: ED Issues     REFERRAL INFORMATION:    Referring provider: Azeb Lewis     Specialty: MD     Referring providers clinic:  [facility]     Clinic contact number:  [phone]    RECORDS REQUESTED FOR VISIT                                                     NOTES  STATUS/DETAILS   OFFICE NOTE from referring provider  yes   OFFICE NOTE from other specialist  no   DISCHARGE SUMMARY from hospital  no   DISCHARGE REPORT from the ER  no   OPERATIVE REPORT  no   MEDICATION LIST  yes     PRE-VISIT CHECKLIST      Record collection complete Yes- All recs in epic    Appointment appropriately scheduled           (right time/right provider) Yes   MyChart activation Yes   Questionnaire complete If no, please explain: In process      Completed by: Brea Casey

## 2019-07-31 NOTE — TELEPHONE ENCOUNTER
[facility] on 7/31/2019 at 9:04 AM

## 2019-08-02 ENCOUNTER — OFFICE VISIT (OUTPATIENT)
Dept: PULMONOLOGY | Facility: CLINIC | Age: 54
End: 2019-08-02
Attending: PEDIATRICS
Payer: COMMERCIAL

## 2019-08-02 VITALS
HEART RATE: 74 BPM | RESPIRATION RATE: 17 BRPM | DIASTOLIC BLOOD PRESSURE: 88 MMHG | SYSTOLIC BLOOD PRESSURE: 145 MMHG | WEIGHT: 203 LBS | OXYGEN SATURATION: 99 % | BODY MASS INDEX: 30.07 KG/M2 | HEIGHT: 69 IN

## 2019-08-02 DIAGNOSIS — K51.00 ULCERATIVE PANCOLITIS WITHOUT COMPLICATION (H): Primary | ICD-10-CM

## 2019-08-02 PROCEDURE — G0463 HOSPITAL OUTPT CLINIC VISIT: HCPCS | Mod: ZF

## 2019-08-02 RX ORDER — MESALAMINE 400 MG/1
2400 CAPSULE, DELAYED RELEASE ORAL 2 TIMES DAILY
Qty: 360 CAPSULE | Refills: 3 | Status: SHIPPED | OUTPATIENT
Start: 2019-08-02 | End: 2019-08-15

## 2019-08-02 ASSESSMENT — PAIN SCALES - GENERAL: PAINLEVEL: NO PAIN (0)

## 2019-08-02 ASSESSMENT — MIFFLIN-ST. JEOR: SCORE: 1746.18

## 2019-08-02 NOTE — LETTER
8/2/2019       RE: Parrish Muir  [address]  [place] [address]     Dear Colleague,    Thank you for referring your patient, Parrish Muir, to the [facility] at [facility]. Please see a copy of my visit note below.    GI CLINIC VISIT    CC/REFERRING PROVIDER: Seb Lafleur  REASON FOR CONSULTATION: UC    HPI: 54 year old male w/ h/o UC pan-colitis since 2003 currently on PO 5-ASA monotherapy, chronic pain syndrome on chronic narcotic pain medications HTN, depression - presenting for f/u UC. Doing well overall, reports having 1-2 soft formed BM/day w/o blood (usually grouped in the morning w/ mild assoc urgency). Denies any tenesmus or insecurity passing flatus, no fecal incontinence or new perianal/nocturnal sxs noted. Denies any overt obstructive sxs at this time, +ongoing passage of stool/flatus. Denies any N/V/F/C/HA/NS or other const/syst/cardiopulmonary sxs, no BRB/melena in stool or overt urinary changes, no unintentional wt loss or appetite/satiety changes. No other bowel/bladder habit changes, no dysphagia/odynophagia. No jaundice/icterus/pruritus, no acholic stools/steatorrhea, no new lumps/bumps, no jt pain/oral ulcer/rash/eye sxs noted.    ROS: 10pt ROS performed and otherwise negative.    PERTINENT PAST MEDICAL/SURGICAL HISTORY:  As noted above.    PERTINENT MEDICATIONS:  - Lialda 2.4g PO QAM (transition to Delzicol today d/t insurance formulary change)  - Anticoagulation/Antiplatelet Agents: none.  Medications reviewed with patient today, see Medication List/Assessment for details.  No other NSAID/anticoagulation reported by patient.  No other OTC/herbal/supplements reported by patient.    SOCIAL HISTORY: Tobacco: none.    PHYSICAL EXAMINATION:  Vitals reviewed, AFVSS  Wt 203# today (stable)  Gen: aaox3, cooperative, pleasant, not diaphoretic, nad  HEENT: ncat, neck supple, no clad/sclad, normal op w/o  ulcer/exudate, anicteric, mmm  Resp/CV without acute findings, not dyspneic/tachycardic  Abd: +nabs, soft, nt, nd, no peritoneal s/s noted  Ext: no c/c/e  Skin: warm, perfused, no jaundice  Neuro: grossly intact, no asterixis noted    PERTINENT STUDIES:  Labs ordered today, currently pending    ASSESSMENT/PLAN:    1. UC Pan-colitis with excellent overall ongoing clinical + endoscopic response thus far - discussed next steps in evaluation and management together today  1. It was wonderful getting a chance to meet with you to discuss your UC Pan-colitis with excellent overall ongoing clinical + endoscopic response thus far - discussed next steps in evaluation and management together today.  - Will transition your Lialda to Delzicol given the change in your insurance coverage, planning for 2400mg taken twice-daily.  - Recommend routine dysplasia surveillance colonoscopy in the next 1 year, can arrange this in early 2020 unless symptomatic sooner. Given the colonic redundancy and intolerance for abdominal distention under MAC sedation (wretching/vomiting x2), will need to be arranged in the [facility] OR setting.  - Discussed Preventive Care in IBD today.    2. Recommend routine studies including nutritional markers as we discussed today.   - Recommend having the following studies checked at least 1-2 times/year for disease and medication safety monitoring: BMP, LFT, CBC with differential, ESR/CRP.    3. Continue to monitor for the danger signs/symptoms we reviewed together today:  worsening abdominal pain, worsening diarrhea, bowel/bladder obstructive symptoms (nausea/vomiting, abdominal distention, difficulty passing stool/flatus/urine), blood mixed into stools, persistent fevers/chills, progressive anemia (particularly with iron deficiency), difficulty with swallowing, perianal/rectal discomfort (particularly with defecation), unexpected weight loss, etc.  - Contact us via MyChart or phone should these  symptoms occur, particularly as we may advise further evaluation accordingly.    2. Cancer Screening  No PSC or known FH CRC, intolerant of MAC sedation. Would be due for completion of dysplasia surveillance in the next 1 year, discussed above.    RTC 6 months, sooner if symptomatic.     Thank you for this consultation. It was a pleasure to participate in the care of this patient; please contact us with any further questions.     Francisco Grigsby MD   of Medicine  [facility] - Department of Medicine  Division of Gastroenterology

## 2019-08-02 NOTE — PATIENT INSTRUCTIONS
I've included a brief summary of our discussion and care plan from today's visit below.  Please review this information with your primary care provider.  _______________________________________________________________________      1. It was wonderful getting a chance to meet with you to discuss your UC Pan-colitis with excellent overall ongoing clinical + endoscopic response thus far - discussed next steps in evaluation and management together today.  - Will transition your Lialda to Delzicol given the change in your insurance coverage, planning for 2400mg taken twice-daily.  - Recommend routine dysplasia surveillance colonoscopy in the next 1 year, can arrange this in early 2020 unless symptomatic sooner. Given the colonic redundancy and intolerance for abdominal distention under MAC sedation (wretching/vomiting x2), will need to be arranged in the [facility] OR setting.  - Discussed Preventive Care in IBD today.    2. Recommend routine studies including nutritional markers as we discussed today.   - Recommend having the following studies checked at least 1-2 times/year for disease and medication safety monitoring: BMP, LFT, CBC with differential, ESR/CRP.    3. Continue to monitor for the danger signs/symptoms we reviewed together today:  worsening abdominal pain, worsening diarrhea, bowel/bladder obstructive symptoms (nausea/vomiting, abdominal distention, difficulty passing stool/flatus/urine), blood mixed into stools, persistent fevers/chills, progressive anemia (particularly with iron deficiency), difficulty with swallowing, perianal/rectal discomfort (particularly with defecation), unexpected weight loss, etc.  - Contact us via Vigohart or phone should these symptoms occur, particularly as we may advise further evaluation accordingly.    4. Return to GI Clinic with me in 6 months to review your progress, sooner if symptomatic.   - If you are unable to schedule this follow-up appointment today,  please contact our  at [phone] within the next week to help set up this necessary appointment.    PREVENTIVE CARE IN INFLAMMATORY BOWEL DISEASE    - Strongly recommend tobacco abstinence.    - Recommend considering daily calcium + Vitamin D supplementation.    - Recommend age-appropriate cancer surveillance:  - Yearly Dermatology visit for visual skin inspection if immunosuppressed, monthly skin self-check after bathing.  - Dysplasia surveillance colonoscopy every 1-2 years in patients with Crohn's colitis or ulcerative colitis >8-10 years since diagnosis.  - (For men and women ages 9-26) Consideration for HPV vaccination, should be discussed with your PCP further if you feel you'd like to pursue this.  - (For women, men with risk factors) Annual Pap smears if immunosuppressed, mammogram when deemed appropriate by your PCP.    - Recommend follow-up with your PCP to ensure the following vaccinations have been updated: Hepatitis A/B, Tdap, Pneumococcal pneumonia, yearly flu SHOT, Meningococcal meningitis (if college-age), HPV (all aged 18-26), etc.  - Would also recommend VZV and MMR titers be checked to confirm immunity.  - Live/attenuated vaccines (such as the INTRANASAL flu vaccine, MMR, Yellow Fever, or Zostavax vaccine) should not be administered to immunosuppressed patients, except in very specific instances which you should discuss with a GI and Infectious Disease provider first.    - Family planning:  If you or your partner are planning to become pregnant, please schedule time with us to discuss issues surrounding IBD and Fertility/Pregnancy.      _______________________________________________________________________    It was a pleasure seeing you in clinic today - please be in touch if there are any further questions that arise following today's visit.  During business hours, you may reach Clinic Nurse Triage Line at [phone].  For urgent/emergent questions after business hours, you  may reach the on-call GI Fellow by contacting the [facility]  at [phone].    Any benign/non-urgent test results are usually communicated via letter or ISVWorldhart message within 1-2 weeks after completion.  Urgent results (those that require a change in the previously-discussed care plan) are usually communicated via a phone call once available from our clinic staff to discuss the results and the next steps in your evaluation.    I recommend signing up for [employer] access if you have not already done so and are comfortable with using a computer.  This allows for online access to your lab results and also helps you communicate efficiently with my clinic should any questions arise in your care.    We have Financial Counseling services available through our clinic.  If you have questions about your insurance coverage or payment responsibilities, particularly before you undergo any tests or procedures, please let us know and we can arrange a consultation accordingly to help you make informed decisions about your healthcare.    Sincerely,    Francisco Grigsby MD    [facility] - Department of Medicine  Division of Gastroenterology

## 2019-08-02 NOTE — NURSING NOTE
Chief Complaint   Patient presents with     RECHECK     IBD    Medications reviewed and vital signs taken.   Gloria Hayes CMA

## 2019-08-02 NOTE — PROGRESS NOTES
GI CLINIC VISIT    CC/REFERRING PROVIDER: Seb Lafleur  REASON FOR CONSULTATION: UC    HPI: 54 year old male w/ h/o UC pan-colitis since 2003 currently on PO 5-ASA monotherapy, chronic pain syndrome on chronic narcotic pain medications HTN, depression - presenting for f/u UC. Doing well overall, reports having 1-2 soft formed BM/day w/o blood (usually grouped in the morning w/ mild assoc urgency). Denies any tenesmus or insecurity passing flatus, no fecal incontinence or new perianal/nocturnal sxs noted. Denies any overt obstructive sxs at this time, +ongoing passage of stool/flatus. Denies any N/V/F/C/HA/NS or other const/syst/cardiopulmonary sxs, no BRB/melena in stool or overt urinary changes, no unintentional wt loss or appetite/satiety changes. No other bowel/bladder habit changes, no dysphagia/odynophagia. No jaundice/icterus/pruritus, no acholic stools/steatorrhea, no new lumps/bumps, no jt pain/oral ulcer/rash/eye sxs noted.    ROS: 10pt ROS performed and otherwise negative.    PERTINENT PAST MEDICAL/SURGICAL HISTORY:  As noted above.    PERTINENT MEDICATIONS:  - Lialda 2.4g PO QAM (transition to Delzicol today d/t insurance formulary change)  - Anticoagulation/Antiplatelet Agents: none.  Medications reviewed with patient today, see Medication List/Assessment for details.  No other NSAID/anticoagulation reported by patient.  No other OTC/herbal/supplements reported by patient.    SOCIAL HISTORY: Tobacco: none.    PHYSICAL EXAMINATION:  Vitals reviewed, AFVSS  Wt 203# today (stable)  Gen: aaox3, cooperative, pleasant, not diaphoretic, nad  HEENT: ncat, neck supple, no clad/sclad, normal op w/o ulcer/exudate, anicteric, mmm  Resp/CV without acute findings, not dyspneic/tachycardic  Abd: +nabs, soft, nt, nd, no peritoneal s/s noted  Ext: no c/c/e  Skin: warm, perfused, no jaundice  Neuro: grossly intact, no asterixis noted    PERTINENT STUDIES:  Labs ordered today, currently pending    ASSESSMENT/PLAN:    1.  UC Pan-colitis with excellent overall ongoing clinical + endoscopic response thus far - discussed next steps in evaluation and management together today  1. It was wonderful getting a chance to meet with you to discuss your UC Pan-colitis with excellent overall ongoing clinical + endoscopic response thus far - discussed next steps in evaluation and management together today.  - Will transition your Lialda to Delzicol given the change in your insurance coverage, planning for 2400mg taken twice-daily.  - Recommend routine dysplasia surveillance colonoscopy in the next 1 year, can arrange this in early 2020 unless symptomatic sooner. Given the colonic redundancy and intolerance for abdominal distention under MAC sedation (wretching/vomiting x2), will need to be arranged in the [facility] OR setting.  - Discussed Preventive Care in IBD today.    2. Recommend routine studies including nutritional markers as we discussed today.   - Recommend having the following studies checked at least 1-2 times/year for disease and medication safety monitoring: BMP, LFT, CBC with differential, ESR/CRP.    3. Continue to monitor for the danger signs/symptoms we reviewed together today:  worsening abdominal pain, worsening diarrhea, bowel/bladder obstructive symptoms (nausea/vomiting, abdominal distention, difficulty passing stool/flatus/urine), blood mixed into stools, persistent fevers/chills, progressive anemia (particularly with iron deficiency), difficulty with swallowing, perianal/rectal discomfort (particularly with defecation), unexpected weight loss, etc.  - Contact us via MyChart or phone should these symptoms occur, particularly as we may advise further evaluation accordingly.    2. Cancer Screening  No PSC or known FH CRC, intolerant of MAC sedation. Would be due for completion of dysplasia surveillance in the next 1 year, discussed above.    RTC 6 months, sooner if symptomatic.     Thank you for this  consultation. It was a pleasure to participate in the care of this patient; please contact us with any further questions.     Francisco Grigsby MD   of Medicine  [facility] - Department of Medicine  Division of Gastroenterology

## 2019-08-06 ENCOUNTER — TELEPHONE (OUTPATIENT)
Dept: PULMONOLOGY | Facility: CLINIC | Age: 54
End: 2019-08-06

## 2019-08-06 ENCOUNTER — TELEPHONE (OUTPATIENT)
Dept: GASTROENTEROLOGY | Facility: CLINIC | Age: 54
End: 2019-08-06

## 2019-08-06 NOTE — TELEPHONE ENCOUNTER
Health Call Center    Phone Message    May a detailed message be left on voicemail: yes    Reason for Call: Medication Question or concern regarding medication   Prescription Clarification  Name of Medication: balsalazide  Prescribing Provider: Dr. Grigsby   Pharmacy: [facility] pharmacy   What on the order needs clarification? Park from [facility] is calling on behalf of the pt because the pharmacy has not received the prescription for Balsalazide that Dr. Grigsby was going to send over. Please send the prescription to the pharmacy, then give Park a call back to confirm it's been sent.          Action Taken: Message routed to:  [facility] ROZINA

## 2019-08-07 NOTE — TELEPHONE ENCOUNTER
Pharmacy states delzicol is not covered.  Called pt to discuss. Called pt to let him know that pharmacy states that pt had a letter from his insurance company stating lialda not covered but Delzicol is. However when pharmacy ran Delzicol was not covered but balsalazide is. Pt stating that our office did not send the prescription to his pharmacy tried more than once to let him know delzicol was sent but not covered. Asked him if he had his letter from the insurance company so could have him read the letter to me about covered medications. . Stated that he was in his office and did no have the letter and did not matter as he had talked to Dr. Grigsby about the delzicol.  Apologized that this was not covered. Will route to Dr. Grigsby for okay for balsalazide.

## 2019-08-08 ENCOUNTER — PATIENT OUTREACH (OUTPATIENT)
Dept: GASTROENTEROLOGY | Facility: CLINIC | Age: 54
End: 2019-08-08

## 2019-08-08 DIAGNOSIS — K51.90 ULCERATIVE COLITIS (H): Primary | ICD-10-CM

## 2019-08-08 RX ORDER — BALSALAZIDE DISODIUM 750 MG/1
2250 CAPSULE ORAL 3 TIMES DAILY
Qty: 270 CAPSULE | Refills: 5 | Status: SHIPPED | OUTPATIENT
Start: 2019-08-08 | End: 2019-08-14

## 2019-08-09 ENCOUNTER — PATIENT OUTREACH (OUTPATIENT)
Dept: GASTROENTEROLOGY | Facility: CLINIC | Age: 54
End: 2019-08-09

## 2019-08-12 ENCOUNTER — OFFICE VISIT (OUTPATIENT)
Dept: DERMATOLOGY | Facility: CLINIC | Age: 54
End: 2019-08-12
Payer: COMMERCIAL

## 2019-08-12 VITALS — HEART RATE: 70 BPM | SYSTOLIC BLOOD PRESSURE: 116 MMHG | DIASTOLIC BLOOD PRESSURE: 78 MMHG

## 2019-08-12 DIAGNOSIS — L70.0 OPEN COMEDONE: Primary | ICD-10-CM

## 2019-08-12 DIAGNOSIS — D48.5 NEOPLASM OF UNCERTAIN BEHAVIOR OF SKIN: ICD-10-CM

## 2019-08-12 RX ORDER — LIDOCAINE HYDROCHLORIDE AND EPINEPHRINE 10; 10 MG/ML; UG/ML
3 INJECTION, SOLUTION INFILTRATION; PERINEURAL ONCE
Status: ACTIVE | OUTPATIENT
Start: 2019-08-12

## 2019-08-12 ASSESSMENT — PAIN SCALES - GENERAL
PAINLEVEL: NO PAIN (0)
PAINLEVEL: NO PAIN (0)

## 2019-08-12 NOTE — LETTER
8/12/2019       RE: Parrish Muir  [address]  [place] [address]     Dear Colleague,    Thank you for referring your patient, Parrish Muir, to the [facility] DERMATOLOGY at [facility]. Please see a copy of my visit note below.    [facility] Dermatology Note      Dermatology Problem List:  1.  Open comedone and dilated pore of Lizbeth of the upper back s/p punch biopsy on 8/3/19, one recurred s/p punch biopsy on 8/12/19  2. PIH of the chest and back related to manipulation of comedones  3. Right axilla nodule that drains white matter intermittently    Encounter Date: Aug 12, 2019    CC:  Chief Complaint   Patient presents with     Derm Problem     Parrish is her for black heads on his back         History of Present Illness:  Mr. Parrish Muir is a 54 year old male who presents as a follow-up for open comedones. The patient was last seen 8/3/18 when he underwent a  punch biopsy of open comedones on his upper back. Has been doing well since the last time he was here but has had one of the open comedones previously removed recur. Notes it is mildly itchy, denies pain. Suspects it has been there for about 2 months, is not sure. Also has 2 black heads on the chest that drain intermittently with manipulation. Has been using the 10% benzoyl peroxide wash every day in the shower. Has not noticed any change in the areas, feels as though they will not go away unless he has them taken out like last time. The lesions are bothersome to him because they drain and he is concerned that they will continue to grow.    Past Medical History:   Patient Active Problem List   Diagnosis     Inflammatory bowel disease (ulcerative colitis) (H)     HTN (hypertension)     Moderate major depression (H)     History of colonic polyps     Benign neoplasm of colon     Anxiety     Past Medical History:   Diagnosis Date     Depression      Hypertension      Past Surgical  History:   Procedure Laterality Date     COLONOSCOPY WITH CO2 INSUFFLATION N/A 11/6/2015    Procedure: COLONOSCOPY WITH CO2 INSUFFLATION;  Surgeon: Francisco Grigsby MD;  Location:  OR       Social History:  Patient reports that he has never smoked. He has never used smokeless tobacco. He reports that he does not drink alcohol or use drugs.    Family History:  Family History   Problem Relation Age of Onset     Alzheimer Disease Mother      Skin Cancer No family hx of      Melanoma No family hx of        Medications:  Current Outpatient Medications   Medication Sig Dispense Refill     acetaminophen (TYLENOL) 500 MG tablet Take 1-2 tablets (500-1,000 mg) by mouth every 6 hours as needed for mild pain 120 tablet 6     baclofen (LIORESAL) 10 MG tablet Take 1 tablet (10 mg) by mouth 2 times daily 90 tablet 1     balsalazide (COLAZAL) 750 MG capsule Take 3 capsules (2,250 mg) by mouth 3 times daily 270 capsule 5     benzoyl peroxide 10 % topical liquid Use daily as directed when showering and be careful about bleaching of towels 227 g 3     gabapentin (NEURONTIN) 300 MG capsule Take 1 capsule (300 mg) by mouth At Bedtime 30 capsule 1     losartan (COZAAR) 100 MG tablet Take 1 tablet (100 mg) by mouth daily 90 tablet 0     NARCAN nasal spray        order for DME Equipment being ordered: Digital home blood pressure monitor kit 1 Units 0     oxyCODONE (OXYCONTIN) 10 MG 12 hr tablet Take 10 mg by mouth every 12 hours       oxyCODONE-acetaminophen (PERCOCET)  MG per tablet Take 1 tablet by mouth 2 times daily       oxyCODONE-acetaminophen (PERCOCET) 5-325 MG per tablet Take 1 tablet by mouth every 6 hours as needed for moderate to severe pain 120 tablet 0     sildenafil (VIAGRA) 50 MG tablet Take 1 tablet (50 mg) by mouth daily as needed (erectile dysfunction) 10 tablet 1     sulfaSALAzine (AZULFIDINE) 500 MG tablet Take 2 tablets (1,000 mg) by mouth 2 times daily 180 tablet 0     cholecalciferol (VITAMIN D3) 5000 units  CAPS capsule TAKE ONE CAPSULE BY MOUTH ONCE DAILY  3     FOLIC ACID PO Take 400 mcg by mouth daily       lidocaine (LMX4) 4 % CREA cream Apply 3 times daily as needed for pain (Patient not taking: Reported on 8/2/2019) 1 Tube 1     mesalamine (DELZICOL) 400 MG DR capsule Take 6 capsules (2,400 mg) by mouth 2 times daily (Patient not taking: Reported on 8/12/2019) 360 capsule 3     mesalamine (LIALDA) 1.2 g EC tablet Take 4 tablets (4,800 mg) by mouth every morning (Patient not taking: Reported on 8/12/2019) 120 tablet 3     mesalamine (ROWASA) 4 g enema Place 1 enema (4 g) rectally At Bedtime (Patient not taking: Reported on 8/2/2019) 30 Bottle 3     Allergies   Allergen Reactions     Morphine Sulfate      Severe hallucinations     Hydrocodone-Acetaminophen Itching         Review of Systems:  -Const: Denies fevers, chills or unexpected changes in weight. Has intentionally lost 25lbs.   -Constitutional: Otherwise feeling well today, in usual state of health.  -HEENT: Patient denies nonhealing oral sores.  -Skin: As above in HPI. No additional skin concerns.    Physical exam:  Vitals: /78   Pulse 70   GEN: This is a well developed, well-nourished male in no acute distress, in a pleasant mood.   SKIN: Focused examination of the back and chest was performed.  -Large papule with a central black punctum on central upper back with peripheral hyperpigmentation  -Hyperpigmentation and mild scarring on mid chest surrounding a papule with a central black punctum with subcutaneous nodule  -Small papules with a central black punctum on the mid chest with subcutaneous nodule  -No other lesions of concern on areas examined.     Impression/Plan:  1. Open comedone versus dilated pore of Lizbeth, upper central back, pruritic and draining    Punch biopsy: After discussion of benefits and risks including but not limited to bleeding, infection, scar, incomplete removal, recurrence, and non-diagnostic biopsy, written consent and  photographs were obtained. Time-out was performed. The area was cleaned with isopropyl alcohol. 2mL of 1% lidocaine with 1:100,000 epinephrine was injected to obtain adequate anesthesia of the lesion on the central upper back. A 4 mm punch biopsy was performed.  4-0 prolene sutures were utilized to approximate the epidermal edges.  White petroleum jelly/VaselineTM and a bandage was applied to the wound.  Explicit verbal and written wound care instructions were provided.  The patient left the Dermatology Clinic in good condition.     Advised to avoid strenuous activity or heavy lifting for the next week    Continue BPO 10% solution daily    2. Benign cyst, epidermal inclusion cyst versus other    Kenalog intralesional injection procedure note : After verbal consent and discussion of risks including but not limited to atrophy, pain, and bruising,  time out was performed, 0.1 total cc of Kenalog 10 mg/cc was injected into one site on the mid chest.  The patient tolerated the procedure well and left the Dermatology clinic in good condition.        Follow-up in 10 days for suture removal, earlier for new or changing lesions.     Staff Involved:  ILiberty, MS3, saw and examined the patient in the presence of Dr. Roberts.    Staff Physician:  I was present with the medical student who participated in the service and in the documentation of the note. I have verified the history and personally performed the physical exam and medical decision making. I agree with the assessment and plan of care as documented in the note.  I was present for the key portion of the biopsy procedure which was also done with the dermatology resident. I was also present for the key portion of the cyst ILK injection.       Julianne Roberts MD  Professor and Chair  Department of Dermatology  [facility]: Phone: [phone], Fax:[phone]  [facility]  [facility]: Phone: [phone], Fax: [phone]                 Drug Administration Record    Prior to injection, verified patient identity using patient's name and date of birth.  Due to injection administration, patient instructed to remain in clinic for 15 minutes  afterwards, and to report any adverse reaction to me immediately.    Drug Name: triamcinolone acetonide(kenalog)  Dose: 0.1mL of triamcinolone 10mg/mL, 9.9mg dose  Route administered: ID  NDC #: [id number]: Kenalog-10 (8894-4783-87)  Amount of waste(mL): 4.9mL  Reason for waste: Multi dose vial    LOT #: [id number]  SITE: See provider note   : [employer]  EXPIRATION DATE: 01/21      Again, thank you for allowing me to participate in the care of your patient.      Sincerely,    Julianne Roberts MD

## 2019-08-12 NOTE — PATIENT INSTRUCTIONS

## 2019-08-12 NOTE — NURSING NOTE
Dermatology Rooming Note    Parrish Muir's goals for this visit include:   Chief Complaint   Patient presents with     Derm Problem     Parrish is her for black heads on his back     Diann Jarquin, CMA

## 2019-08-12 NOTE — PROGRESS NOTES
[facility] Dermatology Note      Dermatology Problem List:  1. Open comedone and dilated pore of Lizbeth of the upper back s/p punch biopsy on 8/3/19, one recurred s/p punch biopsy on 8/12/19  2. PIH of the chest and back related to manipulation of comedones  3. Right axilla nodule that drains white matter intermittently    Encounter Date: Aug 12, 2019    CC:  Chief Complaint   Patient presents with     Derm Problem     Parrish is her for black heads on his back         History of Present Illness:  Mr. Parrish Muir is a 54 year old male who presents as a follow-up for open comedones. The patient was last seen 8/3/18 when he underwent a  punch biopsy of open comedones on his upper back. Has been doing well since the last time he was here but has had one of the open comedones previously removed recur. Notes it is mildly itchy, denies pain. Suspects it has been there for about 2 months, is not sure. Also has 2 black heads on the chest that drain intermittently with manipulation. Has been using the 10% benzoyl peroxide wash every day in the shower. Has not noticed any change in the areas, feels as though they will not go away unless he has them taken out like last time. The lesions are bothersome to him because they drain and he is concerned that they will continue to grow.    Past Medical History:   Patient Active Problem List   Diagnosis     Inflammatory bowel disease (ulcerative colitis) (H)     HTN (hypertension)     Moderate major depression (H)     History of colonic polyps     Benign neoplasm of colon     Anxiety     Past Medical History:   Diagnosis Date     Depression      Hypertension      Past Surgical History:   Procedure Laterality Date     COLONOSCOPY WITH CO2 INSUFFLATION N/A 11/6/2015    Procedure: COLONOSCOPY WITH CO2 INSUFFLATION;  Surgeon: Francisco Grigsby MD;  Location:  OR       Social History:  Patient reports that he has never smoked. He has never used smokeless tobacco. He  reports that he does not drink alcohol or use drugs.    Family History:  Family History   Problem Relation Age of Onset     Alzheimer Disease Mother      Skin Cancer No family hx of      Melanoma No family hx of        Medications:  Current Outpatient Medications   Medication Sig Dispense Refill     acetaminophen (TYLENOL) 500 MG tablet Take 1-2 tablets (500-1,000 mg) by mouth every 6 hours as needed for mild pain 120 tablet 6     baclofen (LIORESAL) 10 MG tablet Take 1 tablet (10 mg) by mouth 2 times daily 90 tablet 1     balsalazide (COLAZAL) 750 MG capsule Take 3 capsules (2,250 mg) by mouth 3 times daily 270 capsule 5     benzoyl peroxide 10 % topical liquid Use daily as directed when showering and be careful about bleaching of towels 227 g 3     gabapentin (NEURONTIN) 300 MG capsule Take 1 capsule (300 mg) by mouth At Bedtime 30 capsule 1     losartan (COZAAR) 100 MG tablet Take 1 tablet (100 mg) by mouth daily 90 tablet 0     NARCAN nasal spray        order for DME Equipment being ordered: Digital home blood pressure monitor kit 1 Units 0     oxyCODONE (OXYCONTIN) 10 MG 12 hr tablet Take 10 mg by mouth every 12 hours       oxyCODONE-acetaminophen (PERCOCET)  MG per tablet Take 1 tablet by mouth 2 times daily       oxyCODONE-acetaminophen (PERCOCET) 5-325 MG per tablet Take 1 tablet by mouth every 6 hours as needed for moderate to severe pain 120 tablet 0     sildenafil (VIAGRA) 50 MG tablet Take 1 tablet (50 mg) by mouth daily as needed (erectile dysfunction) 10 tablet 1     sulfaSALAzine (AZULFIDINE) 500 MG tablet Take 2 tablets (1,000 mg) by mouth 2 times daily 180 tablet 0     cholecalciferol (VITAMIN D3) 5000 units CAPS capsule TAKE ONE CAPSULE BY MOUTH ONCE DAILY  3     FOLIC ACID PO Take 400 mcg by mouth daily       lidocaine (LMX4) 4 % CREA cream Apply 3 times daily as needed for pain (Patient not taking: Reported on 8/2/2019) 1 Tube 1     mesalamine (DELZICOL) 400 MG DR capsule Take 6 capsules  (2,400 mg) by mouth 2 times daily (Patient not taking: Reported on 8/12/2019) 360 capsule 3     mesalamine (LIALDA) 1.2 g EC tablet Take 4 tablets (4,800 mg) by mouth every morning (Patient not taking: Reported on 8/12/2019) 120 tablet 3     mesalamine (ROWASA) 4 g enema Place 1 enema (4 g) rectally At Bedtime (Patient not taking: Reported on 8/2/2019) 30 Bottle 3     Allergies   Allergen Reactions     Morphine Sulfate      Severe hallucinations     Hydrocodone-Acetaminophen Itching         Review of Systems:  -Const: Denies fevers, chills or unexpected changes in weight. Has intentionally lost 25lbs.   -Constitutional: Otherwise feeling well today, in usual state of health.  -HEENT: Patient denies nonhealing oral sores.  -Skin: As above in HPI. No additional skin concerns.    Physical exam:  Vitals: /78   Pulse 70   GEN: This is a well developed, well-nourished male in no acute distress, in a pleasant mood.   SKIN: Focused examination of the back and chest was performed.  -Large papule with a central black punctum on central upper back with peripheral hyperpigmentation  -Hyperpigmentation and mild scarring on mid chest surrounding a papule with a central black punctum with subcutaneous nodule  -Small papules with a central black punctum on the mid chest with subcutaneous nodule  -No other lesions of concern on areas examined.     Impression/Plan:  1. Open comedone versus dilated pore of Lizbeth, upper central back, pruritic and draining    Punch biopsy: After discussion of benefits and risks including but not limited to bleeding, infection, scar, incomplete removal, recurrence, and non-diagnostic biopsy, written consent and photographs were obtained. Time-out was performed. The area was cleaned with isopropyl alcohol. 2mL of 1% lidocaine with 1:100,000 epinephrine was injected to obtain adequate anesthesia of the lesion on the central upper back. A 4 mm punch biopsy was performed.  4-0 prolene sutures were  utilized to approximate the epidermal edges.  White petroleum jelly/VaselineTM and a bandage was applied to the wound.  Explicit verbal and written wound care instructions were provided.  The patient left the Dermatology Clinic in good condition.     Advised to avoid strenuous activity or heavy lifting for the next week    Continue BPO 10% solution daily    2. Benign cyst, epidermal inclusion cyst versus other    Kenalog intralesional injection procedure note : After verbal consent and discussion of risks including but not limited to atrophy, pain, and bruising,  time out was performed, 0.1 total cc of Kenalog 10 mg/cc was injected into one site on the mid chest.  The patient tolerated the procedure well and left the Dermatology clinic in good condition.        Follow-up in 10 days for suture removal, earlier for new or changing lesions.     Staff Involved:  I, Liberty Beltran, MS3, saw and examined the patient in the presence of Dr. Roberts.    Staff Physician:  I was present with the medical student who participated in the service and in the documentation of the note. I have verified the history and personally performed the physical exam and medical decision making. I agree with the assessment and plan of care as documented in the note.  I was present for the key portion of the biopsy procedure which was also done with the dermatology resident. I was also present for the key portion of the cyst ILK injection.       Julianne Roberts MD  Professor and Chair  Department of Dermatology  [facility]: Phone: [phone], Fax:[phone]  [facility]: Phone: [phone], Fax: [phone]

## 2019-08-12 NOTE — NURSING NOTE
Lidocaine-epinephrine 1-1:822526 % injection   1mL once for one use, starting 8/12/2019 ending 8/12/2019,  2mL disp, R-0, injection  Injected by VINCE Rodriguez

## 2019-08-12 NOTE — PROGRESS NOTES
Drug Administration Record    Prior to injection, verified patient identity using patient's name and date of birth.  Due to injection administration, patient instructed to remain in clinic for 15 minutes  afterwards, and to report any adverse reaction to me immediately.    Drug Name: triamcinolone acetonide(kenalog)  Dose: 0.1mL of triamcinolone 10mg/mL, 9.9mg dose  Route administered: ID  NDC #: [id number]: Kenalog-10 (3245-9335-72)  Amount of waste(mL): 4.9mL  Reason for waste: Multi dose vial    LOT #: [id number]  SITE: See provider note   : [employer]  EXPIRATION DATE: 01/21

## 2019-08-13 NOTE — TELEPHONE ENCOUNTER
Rec'd a voicemail from Sabine at [facility] pharmacy. She was able to get the Delzicol processed, but wants to know if they should discontinue the Delzicol? Or the Balsalazide? Or is patient to be on both? She can be reached at [phone].

## 2019-08-13 NOTE — TELEPHONE ENCOUNTER
Central Prior Authorization Team   Phone: [phone]    PA Initiation    Medication: mesalamine (DELZICOL) 400 MG DR capsule  Insurance Company: [employer] - Phone [phone] Fax [phone]  Pharmacy Filling the Rx: [id number] - [place], [place] - [address] [place] [address]  Filling Pharmacy Phone: [phone]  Filling Pharmacy Fax:    Start Date: 8/12/2019

## 2019-08-13 NOTE — TELEPHONE ENCOUNTER
Prior Authorization Not Needed per Insurance. Insurance prefers name brand Delzicol.    Medication: mesalamine (DELZICOL) 400 MG DR capsule  Insurance Company: [employer] - Phone [phone] Fax [phone]  Expected CoPay:      Pharmacy Filling the Rx: [id number] - [address]  Pharmacy Notified: Yes - faxed letter to pharmacy stating to process name brand  Patient Notified:

## 2019-08-14 ENCOUNTER — PATIENT OUTREACH (OUTPATIENT)
Dept: GASTROENTEROLOGY | Facility: CLINIC | Age: 54
End: 2019-08-14

## 2019-08-14 LAB — COPATH REPORT: NORMAL

## 2019-08-14 NOTE — TELEPHONE ENCOUNTER
Called the patient's pharmacy to make sure that there is a call read through his insurance he is able to pick it up with $3 co-pay.  Called patient left a message stating this information to him left my name and number if he had any other questions or concerns.  Will remove the balsalazide aside from his medication list.

## 2019-08-15 ENCOUNTER — OFFICE VISIT (OUTPATIENT)
Dept: INTERNAL MEDICINE | Facility: CLINIC | Age: 54
End: 2019-08-15
Payer: COMMERCIAL

## 2019-08-15 ENCOUNTER — APPOINTMENT (OUTPATIENT)
Dept: LAB | Facility: CLINIC | Age: 54
End: 2019-08-15
Payer: COMMERCIAL

## 2019-08-15 VITALS
BODY MASS INDEX: 30.36 KG/M2 | SYSTOLIC BLOOD PRESSURE: 134 MMHG | WEIGHT: 205 LBS | DIASTOLIC BLOOD PRESSURE: 83 MMHG | HEIGHT: 69 IN | HEART RATE: 80 BPM | OXYGEN SATURATION: 98 %

## 2019-08-15 DIAGNOSIS — K51.00 ULCERATIVE PANCOLITIS WITHOUT COMPLICATION (H): ICD-10-CM

## 2019-08-15 DIAGNOSIS — I10 BENIGN ESSENTIAL HYPERTENSION: ICD-10-CM

## 2019-08-15 DIAGNOSIS — E55.9 VITAMIN D DEFICIENCY: Primary | ICD-10-CM

## 2019-08-15 LAB
ALBUMIN SERPL-MCNC: 4 G/DL (ref 3.4–5)
ALP SERPL-CCNC: 70 U/L (ref 40–150)
ALT SERPL W P-5'-P-CCNC: 40 U/L (ref 0–70)
ANION GAP SERPL CALCULATED.3IONS-SCNC: 3 MMOL/L (ref 3–14)
AST SERPL W P-5'-P-CCNC: 23 U/L (ref 0–45)
BASOPHILS # BLD AUTO: 0.1 10E9/L (ref 0–0.2)
BASOPHILS NFR BLD AUTO: 1.2 %
BILIRUB DIRECT SERPL-MCNC: 0.1 MG/DL (ref 0–0.2)
BILIRUB SERPL-MCNC: 0.5 MG/DL (ref 0.2–1.3)
BUN SERPL-MCNC: 15 MG/DL (ref 7–30)
CALCIUM SERPL-MCNC: 9.4 MG/DL (ref 8.5–10.1)
CHLORIDE SERPL-SCNC: 105 MMOL/L (ref 94–109)
CO2 SERPL-SCNC: 31 MMOL/L (ref 20–32)
CREAT SERPL-MCNC: 1.03 MG/DL (ref 0.66–1.25)
CRP SERPL-MCNC: <2.9 MG/L (ref 0–8)
DEPRECATED CALCIDIOL+CALCIFEROL SERPL-MC: 20 UG/L (ref 20–75)
DIFFERENTIAL METHOD BLD: NORMAL
EOSINOPHIL # BLD AUTO: 0.1 10E9/L (ref 0–0.7)
EOSINOPHIL NFR BLD AUTO: 1.5 %
ERYTHROCYTE [DISTWIDTH] IN BLOOD BY AUTOMATED COUNT: 12.2 % (ref 10–15)
ERYTHROCYTE [SEDIMENTATION RATE] IN BLOOD BY WESTERGREN METHOD: 6 MM/H (ref 0–20)
GFR SERPL CREATININE-BSD FRML MDRD: 81 ML/MIN/{1.73_M2}
GLUCOSE SERPL-MCNC: 104 MG/DL (ref 70–99)
HCT VFR BLD AUTO: 42.8 % (ref 40–53)
HGB BLD-MCNC: 14 G/DL (ref 13.3–17.7)
IMM GRANULOCYTES # BLD: 0 10E9/L (ref 0–0.4)
IMM GRANULOCYTES NFR BLD: 0.3 %
LYMPHOCYTES # BLD AUTO: 2 10E9/L (ref 0.8–5.3)
LYMPHOCYTES NFR BLD AUTO: 30.3 %
MCH RBC QN AUTO: 30 PG (ref 26.5–33)
MCHC RBC AUTO-ENTMCNC: 32.7 G/DL (ref 31.5–36.5)
MCV RBC AUTO: 92 FL (ref 78–100)
MONOCYTES # BLD AUTO: 0.4 10E9/L (ref 0–1.3)
MONOCYTES NFR BLD AUTO: 6.6 %
NEUTROPHILS # BLD AUTO: 3.9 10E9/L (ref 1.6–8.3)
NEUTROPHILS NFR BLD AUTO: 60.1 %
NRBC # BLD AUTO: 0 10*3/UL
NRBC BLD AUTO-RTO: 0 /100
PLATELET # BLD AUTO: 225 10E9/L (ref 150–450)
POTASSIUM SERPL-SCNC: 4.6 MMOL/L (ref 3.4–5.3)
PROT SERPL-MCNC: 7.6 G/DL (ref 6.8–8.8)
RBC # BLD AUTO: 4.67 10E12/L (ref 4.4–5.9)
SODIUM SERPL-SCNC: 139 MMOL/L (ref 133–144)
WBC # BLD AUTO: 6.6 10E9/L (ref 4–11)

## 2019-08-15 RX ORDER — LOSARTAN POTASSIUM 100 MG/1
100 TABLET ORAL DAILY
Qty: 90 TABLET | Refills: 3 | Status: SHIPPED | OUTPATIENT
Start: 2019-08-15 | End: 2020-03-16

## 2019-08-15 ASSESSMENT — MIFFLIN-ST. JEOR: SCORE: 1755.5

## 2019-08-15 ASSESSMENT — PAIN SCALES - GENERAL: PAINLEVEL: MODERATE PAIN (5)

## 2019-08-15 NOTE — PATIENT INSTRUCTIONS
[facility] Medication Refill Request Information:  * Please contact your pharmacy regarding ANY request for medication refills.  ** [facility] Prescription Fax = [phone]  * Please allow 3 business days for routine medication refills.  * Please allow 5 business days for controlled substance medication refills.     [facility] Test Result notification information:  *You will be notified with in 7-10 days of your appointment day regarding the results of your test.  If you are on MyChart you will be notified as soon as the provider has reviewed the results and signed off on them.    [facility]: [phone]

## 2019-08-15 NOTE — PROGRESS NOTES
"  PRIMARY CARE CENTER         HPI:       Parrish Muir is a 54 year old male who presents for the following  Patient presents with: Recheck Medication (Patient is here for a BP med check )     54 y.o male with HTN, Ulcerative Colitis, depression who presented   for medication refill. He was recently started on Losartan for BP control. He documented his BP for 30 days; SBP ranges from 110-130. Concerned that he might have Vit D deficiency.     Otherwise no changes in bowel habit, no headache, SOB, chest pain, abdominal pain, or urinary symptoms.       Problem, Medication and Allergy Lists were reviewed and are current.  Patient is an established patient of this clinic.         Review of Systems:     Review Of Systems  Skin: negative  Eyes: negative  Ears/Nose/Throat: negative  Respiratory: No shortness of breath, dyspnea on exertion, cough, or hemoptysis  Cardiovascular: negative and chest pain  Gastrointestinal: negative and abdominal pain  Genitourinary: negative, frequency, urgency and hesitancy  Musculoskeletal: negative and joint pain  Neurologic: negative and headaches  Psychiatric: negative and anxiety  Hematologic/Lymphatic/Immunologic: negative, chills and fever  Endocrine: negative, cold intolerance, heat intolerance, polyphagia and polydipsia            Physical Exam:   /83 (BP Location: Right arm, Patient Position: Sitting, Cuff Size: Adult Regular)   Pulse 80   Ht 1.753 m (5' 9.02\")   Wt 93 kg (205 lb)   SpO2 98%   BMI 30.26 kg/m    Body mass index is 30.26 kg/m .  Vitals were reviewed.     Gen: In no acute distress. Patient comfortably sitting on exam table in clinic.   HEENT: No scleral icterus  CV: Normal rate, regular rhythm. S1/S2 normal.   Resp: Clear to auscultation bilaterally. Without wheeze or crackles  Abdomen: Soft, non tender abdomen,    Extremities: No peripheral edema  Skin: without rash or trauma  Neuro: Oriented to - City, Date, Year   5/5 strength in the proximal and " distal upper and lower ext bilaterally  Normal sensation to light touch in the upper and lower ext bilaterally      Results:   Pending     Assessment and Plan     54 y.o male with HTN, Ulcerative Colitis, depression was seen today for recheck medication and good BP control.  Physical exam benign, vitals normal. BP well controlled with Losartan.  Renewed Losartan. Future labs to be done today; concerned about vitamin D deficiency. Renewed his Vitamin D, so he can take while vitamin D levels are pending.   Concern for Vitamin D deficiency   Benign essential hypertension  -     losartan (COZAAR) 100 MG tablet; Take 1 tablet (100 mg) by mouth daily  -      Renew Vitamin D supplements     Options for treatment and follow-up care were reviewed with the patient. Parrish Muir engaged in the decision making process and verbalized understanding of the options discussed and agreed with the final plan.    Sherry Mai MD  Aug 15, 2019    Pt was seen and plan of care discussed with Dr. Tomas.     Teaching Physician Note:    While the patient was in clinic, I reviewed the patient's medical history and results, the resident's findings on physical examination, and the patient's diagnosis and treatment plan with the resident.  I agree with the information documented with the following exceptions: none.    Stephanie Tomas MD, PhD

## 2019-08-15 NOTE — NURSING NOTE
Chief Complaint   Patient presents with     Recheck Medication     Patient is here for a BP med check        Renetta Trevino, EMT at 9:35 AM on 8/15/2019.

## 2019-09-26 ENCOUNTER — PRE VISIT (OUTPATIENT)
Dept: UROLOGY | Facility: CLINIC | Age: 54
End: 2019-09-26

## 2019-09-28 ENCOUNTER — HEALTH MAINTENANCE LETTER (OUTPATIENT)
Age: 54
End: 2019-09-28

## 2019-10-08 ENCOUNTER — PRE VISIT (OUTPATIENT)
Dept: UROLOGY | Facility: CLINIC | Age: 54
End: 2019-10-08

## 2019-10-08 NOTE — TELEPHONE ENCOUNTER
Reason for Visit: Consult    Diagnosis: ED    Orders/Procedures/Records: in system    Contact Patient: n/a    Rooming Requirements: normal      Gloria Richardson LPN  10/08/19  1:40 PM

## 2019-10-17 DIAGNOSIS — G43.009 MIGRAINE WITHOUT AURA AND WITHOUT STATUS MIGRAINOSUS, NOT INTRACTABLE: ICD-10-CM

## 2019-10-18 RX ORDER — ACETAMINOPHEN 500 MG
500-1000 TABLET ORAL EVERY 6 HOURS PRN
Qty: 120 TABLET | Refills: 6 | Status: SHIPPED | OUTPATIENT
Start: 2019-10-18 | End: 2020-11-11

## 2019-10-18 NOTE — TELEPHONE ENCOUNTER
8/15/2019  [facility]   Sherry Mai MD

## 2020-03-13 DIAGNOSIS — I10 BENIGN ESSENTIAL HYPERTENSION: ICD-10-CM

## 2020-03-13 NOTE — TELEPHONE ENCOUNTER
[facility] Call Center    Phone Message    May a detailed message be left on voicemail: yes     Reason for Call: Medication Refill Request    Has the patient contacted the pharmacy for the refill? Yes   Name of medication being requested: losartan (COZAAR) 100 MG tablet   Provider who prescribed the medication: Dr. Miquel Abraham  Pharmacy: [address] [address]  Date medication is needed: ASAP    Patient is requesting a call back to discuss medication.    Action Taken: Message routed to:  [facility] ([facility]    Travel Screening: Not Applicable

## 2020-03-16 RX ORDER — LOSARTAN POTASSIUM 100 MG/1
100 TABLET ORAL DAILY
Qty: 90 TABLET | Refills: 3 | Status: SHIPPED | OUTPATIENT
Start: 2020-03-16 | End: 2021-03-31

## 2020-11-06 DIAGNOSIS — K51.90 INFLAMMATORY BOWEL DISEASE (ULCERATIVE COLITIS) (H): Primary | ICD-10-CM

## 2020-11-06 DIAGNOSIS — G43.009 MIGRAINE WITHOUT AURA AND WITHOUT STATUS MIGRAINOSUS, NOT INTRACTABLE: ICD-10-CM

## 2020-11-11 RX ORDER — ACETAMINOPHEN 500 MG
500-1000 TABLET ORAL EVERY 6 HOURS PRN
Qty: 120 TABLET | Refills: 0 | Status: SHIPPED | OUTPATIENT
Start: 2020-11-11 | End: 2021-02-01

## 2020-11-11 NOTE — TELEPHONE ENCOUNTER
Tried calling patient, no answered. Left message with Primary Care clinic number requesting patient to call back to schedule annual appointment as Last Clinic Visit: 8/15/2019.

## 2020-11-11 NOTE — TELEPHONE ENCOUNTER
BALSALAZIDE DISODIUM 750MG CAPS      Last Written Prescription Date:  Not on med list    Last Office Visit : 10-17-18  Future Office visit:  none    Routing refill request to provider for review/approval because:  Med not on med list  see notes: 8-6-19, 8-8-10 regarding               insurance coverage  Overdue lab: Cr      Scheduling has been notified to contact the pt for appointment.

## 2020-11-11 NOTE — TELEPHONE ENCOUNTER
Last Clinic Visit: 8/15/2019  [facility]  Scheduling has been notified to contact the pt for appointment.  90 day RF

## 2020-11-17 ENCOUNTER — PATIENT OUTREACH (OUTPATIENT)
Dept: GASTROENTEROLOGY | Facility: CLINIC | Age: 55
End: 2020-11-17

## 2020-11-17 NOTE — PROGRESS NOTES
Patient has not been seen since October 2018.   Patient requesting refill of mesalamine.   Informed due to no labs and length of time will need to wait until clinic appt.    Pt now scheduled on November 24 at 420.

## 2020-11-24 ENCOUNTER — VIRTUAL VISIT (OUTPATIENT)
Dept: GASTROENTEROLOGY | Facility: CLINIC | Age: 55
End: 2020-11-24
Payer: COMMERCIAL

## 2020-11-24 VITALS — WEIGHT: 200 LBS | BODY MASS INDEX: 29.62 KG/M2 | HEIGHT: 69 IN

## 2020-11-24 DIAGNOSIS — K51.00 ULCERATIVE PANCOLITIS (H): Primary | ICD-10-CM

## 2020-11-24 PROCEDURE — 99214 OFFICE O/P EST MOD 30 MIN: CPT | Mod: GC | Performed by: STUDENT IN AN ORGANIZED HEALTH CARE EDUCATION/TRAINING PROGRAM

## 2020-11-24 RX ORDER — POLYETHYLENE GLYCOL 3350 17 G/17G
17 POWDER, FOR SOLUTION ORAL 2 TIMES DAILY
Qty: 510 G | Refills: 3 | Status: SHIPPED | OUTPATIENT
Start: 2020-11-24 | End: 2020-11-24

## 2020-11-24 RX ORDER — BALSALAZIDE DISODIUM 750 MG/1
2250 CAPSULE ORAL 3 TIMES DAILY
Qty: 270 CAPSULE | Refills: 5 | Status: SHIPPED | OUTPATIENT
Start: 2020-11-24 | End: 2020-11-24

## 2020-11-24 RX ORDER — POLYETHYLENE GLYCOL 3350 17 G/17G
17 POWDER, FOR SOLUTION ORAL 2 TIMES DAILY
Qty: 510 G | Refills: 3 | Status: SHIPPED | OUTPATIENT
Start: 2020-11-24

## 2020-11-24 RX ORDER — BALSALAZIDE DISODIUM 750 MG/1
2250 CAPSULE ORAL 3 TIMES DAILY
Qty: 270 CAPSULE | Refills: 5 | Status: SHIPPED | OUTPATIENT
Start: 2020-11-24 | End: 2021-02-01

## 2020-11-24 ASSESSMENT — MIFFLIN-ST. JEOR: SCORE: 1727.57

## 2020-11-24 ASSESSMENT — PAIN SCALES - GENERAL: PAINLEVEL: MODERATE PAIN (5)

## 2020-11-24 NOTE — NURSING NOTE
"Chief Complaint   Patient presents with     Follow Up     follow up       Vitals:    11/24/20 1601   Weight: 90.7 kg (200 lb)   Height: 1.753 m (5' 9\")       Body mass index is 29.53 kg/m .    Dian Delgadillo CMA    "

## 2020-11-24 NOTE — PROGRESS NOTES
"Parrish Muir is a 55 year old male who is being evaluated via a billable video visit.      The patient has been notified of following:     \"This video visit will be conducted via a call between you and your physician/provider. We have found that certain health care needs can be provided without the need for an in-person physical exam.  This service lets us provide the care you need with a video conversation.  If a prescription is necessary we can send it directly to your pharmacy.  If lab work is needed we can place an order for that and you can then stop by our lab to have the test done at a later time.    Video visits are billed at different rates depending on your insurance coverage.  Please reach out to your insurance provider with any questions.    If during the course of the call the physician/provider feels a video visit is not appropriate, you will not be charged for this service.\"    Patient has given verbal consent for Video visit? Yes  How would you like to obtain your AVS? MyChart  If you are dropped from the video visit, the video invite should be resent to: Text to cell phone: [phone]  Will anyone else be joining your video visit? No        Video-Visit Details    Type of service:  Video Visit    Video Start Time: 4:30 PM  Video End Time: 5:07 PM    Originating Location (pt. Location): Home    Distant Location (provider location):  [facility] [place]     Platform used for Video Visit: Treva Vee MD        "

## 2020-11-24 NOTE — PATIENT INSTRUCTIONS
It was a pleasure taking care of you today.  I've included a brief summary of our discussion and care plan from today's visit below.  Please review this information with your primary care provider.  _____________________________________________________________________    My recommendations are summarized as follows:    - Continue your mesalamine as prescribed (Balsalazide 2.25g three times daily)  - Start taking miralax, one packet (17g) twice daily for a goal of 1-2 soft bowel movements daily, you can increase by one packet (17g) daily every 2-3 days if you are not achieving your goal of 1-2 soft bowel movements daily.   - Blood work to be scheduled near you: CBC, CMP, ESR, CRP  - Schedule a colonoscopy at [facility] with our anesthesia team (plan for week of 12/18-12/24 with Dr. Jett)  - For your colonoscopy we recommend the following:    -- Increase your miralax to two packets twice daily the week before your colonoscopy   -- Low fiber diet the week before your colonoscopy   -- Clear liquid diet only the day before your colonoscopy    -- Follow the instructions for your miralax bowel preparation that you should receive before your procedure     Return to GI Clinic in 6 months to review your progress.    ____________________________________________________________________    Who do I call with any questions after my visit?  Please be in touch if there are any further questions that arise following today's visit.  There are multiple ways to contact your gastroenterology care team.        During business hours, you may reach a Gastroenterology nurse at [phone] and choose option 3.         To schedule or reschedule an appointment, please call [phone].       You can always send a secure message through [employer].  [employer] messages are answered by your nurse or doctor typically within 24 hours.  Please allow extra time on weekends and holidays.        For urgent/emergent questions after business hours,  you may reach the on-call GI Fellow by contacting the [facility]  at [phone].     How will I get the results of any tests ordered?    You will receive all of your results.  If you have signed up for [employer], any tests ordered at your visit will be available to you after your physician reviews them.  Typically this takes 1-2 weeks.  If there are urgent results that require a change in your care plan, your physician or nurse will call you to discuss the next steps.      What is [employer]?  [employer] is a secure way for you to access all of your healthcare records from the [facility].  It is a web based computer program, so you can sign on to it from any location.  It also allows you to send secure messages to your care team.  I recommend signing up for [employer] access if you have not already done so and are comfortable with using a computer.      How to I schedule a follow-up visit?  If you did not schedule a follow-up visit today, please call [phone] to schedule a follow-up office visit.        Sincerely,    Rocio Vee MD  GI Fellow  [facility]  Division of Gastroenterology, Hepatology & Nutrition

## 2020-11-24 NOTE — PROGRESS NOTES
IBD CLINIC VISIT     CC/REFERRING MD:  Referred Self  REASON FOR CONSULTATION: Follow up UC pancolitis    IBD HISTORY  Age at diagnosis: 38 ()  Extent of disease: Pancolitis  Current UC medications:   -- Balsalazide 2.25g TID (changed from Lialda to delzicol to balsalazide in 2019 due to insurance changes)  Prior UC surgeries: None  Prior IBD Medications:  -- Lialda 4.8g daily (changed 2019 to balsalazide due to insurance change)  -- Rowasa enemas (rx'd 2018 and 3/2019)    DRUG MONITORING  TPMT enzyme activity: N/A    6-TGN/6-MMPN levels: N/A    Biologic concentration: N/A    DISEASE ASSESSMENT  Labs:  Recent Labs   Lab Test 08/15/19  1050 10/17/18  1022   CRP <2.9 <2.9   SED 6 6     Endoscopic assessment:   -- Colonoscopy (2018): Esparza 2 inflammation in distal sigmoid and rectum otherwise normal endoscopic exam. 2mm polyp in cecum resected but not retrieved. No dysplasia biopsies obtained due to patient intolerance of procedure with MAC and poor abdominal tone.  Enterography: N/A  Fecal calprotectin: Not done  C diff: Not done    Esparza score:   Stool freq: 0 (baseline stools frequency)  Rectal bleedin (None)  PGA: 0 (normal)  Endoscopy: Not done    Remission: <3   Mild disease: 3-5  Moderate disease: 6-10  Severe disease: >10    ASSESSMENT/PLAN  Mr. Muir is a 55 year old male w/ h/o UC pan-colitis since  currently on PO 5-ASA monotherapy, chronic pain syndrome on chronic narcotic pain medications HTN, depression who presents for follow up of UC.     #. UC pancolitis:  In clinical remission on balsalazide monotherapy. Last endoscopic assessment 2018 with Esparza 2 disease in rectosigmoid colon. Plan for colonoscopy for disease re-assessment and dysplasia surveillance.    Recommendations:  -- Schedule labs in the next month: BMP, LFTs, CBC with diff, ESR, CRP  -- Continue Balsalazide 2.25g TID (refills sent)  -- Start taking miralax, one packet (17g) twice daily for a goal of 1-2 soft bowel  movements daily, you can increase by one packet (17g) daily every 2-3 days if you are not achieving your goal of 1-2 soft bowel movements daily.   -- Schedule colonoscopy for disease assessment and dysplasia surveillance in the OR at [facility] (patient intolerant of MAC due to redundant colon and intolerance of abdominal distention under MAC sedation - patient had wretching and vomiting x2 8/2018)  --For your colonoscopy we recommend the following:    -- Increase your miralax to two packets twice daily the week before your colonoscopy   -- Low fiber diet the week before your colonoscopy   -- Clear liquid diet only the day before your colonoscopy    -- Follow the instructions for your miralax bowel preparation that you should receive before your procedure  -- We discussed the importance of remaining on the patient's current immunosuppressive therapy.  Discussed that the risks of coronavirus on these medications were small.  Discussed that should medications be interrupted, there could be a flare of the underlying inflammatory bowel disease which could require prednisone or hospitalization, both of which would likely increase the risk of coronavirus beyond that of the current medications.  Also discussed that should we enter a medications there is no guarantee that they would be effective again.  Finally we discussed the importance of hand hygiene, social isolation, and following all routine recommendations from the CDC.    #. IBD healthcare maintenance:     Colon cancer surveillance   -- No PSC or known FH CRC, intolerant of MAC sedation. Due for completion of dysplasia surveillance.    Bone mineral density screening   -- Recommend all patients supplement with calcium and vitamin D    Vaccinations  -- Yearly influenza vaccination: Due now  -- Hepatitis B vaccination: Titer to be checked  -- Hepatitis A vaccination: Titer to be checked  -- TDaP: Last Given 6/2018  -- Pneumonia vaccination once and 5 year  booster: Note done (no systemic immunosuppression)    Not immunocompromised:  -- Varicella vaccination:   -- Zoster vaccination: Recommend to give if over 60.  Recommend to discuss if over 50.  Some authorities recommend to administer to patients > 50 due to increased risk of immunosuppression.      Misc  -- Avoid tobacco use  -- Avoid NSAIDs as there is potentially a 25% chance of causing an IBD flare  -- Recommend yearly skin cancer exam, especially if on, or previously exposed to, an immunomodulator or biologic.     RTC 6 months    Thank you for this consultation.  It was a pleasure to participate in the care of this patient; please contact us with any further questions.  A total of 37 minutes was spent with this patient, >50% of which was counseling regarding the above delineated issues.    Rocio Vee MD  GI Fellow, PGY-6  Division of Gastroenterology, Hepatology and Nutrition  [facility]  [phone]      HPI:   Currently, states that he has been doing relatively well. He is currently having one large, formed bowel movement every other day without any blood. Denies any straining with bowel movements and does not need to sit on the toilet for more than 5 minutes to pass bowel movements. He has discomfort in the low abdomen that builds up before he has his bowel movements, the discomfort does eventually go away 8-10 hours after having a bowel movement. He has not tried any laxatives or fiber for these symptoms. For his chronic pain he is on two 10mg oxyconton daily and is also taking vicodin - he states that these doses are chronic and stable for the past 10 years.    States he has been out of the balsalazide for the past 5 days.     Denies any fevers, chills, tenesmus, nausea, vomiting, weight loss. No fecal incontinence, urgency, nocturnal stool or perinal disease.    Denies any EIM.    ROS:    No fevers or chills  No weight loss  No blurry vision, double vision or change in vision  No sore  throat  No lymphadenopathy  No headache, paraesthesias, or weakness in a limb  No shortness of breath or wheezing  No chest pain or pressure  No arthralgias or myalgias  No rashes or skin changes  No odynophagia or dysphagia  No BRBPR, hematochezia, melena  No dysuria, frequency or urgency  No hot/cold intolerance or polyria  No anxiety or depression    Extra intestinal manifestations of IBD:  No uveitis/episcleritis  No aphthous ulcers   No arthritis   No erythema nodosum/pyoderma gangrenosum.     PERTINENT PAST MEDICAL HISTORY:  Past Medical History:   Diagnosis Date     Depression      Hypertension        PREVIOUS SURGERIES:  Past Surgical History:   Procedure Laterality Date     COLONOSCOPY WITH CO2 INSUFFLATION N/A 11/6/2015    Procedure: COLONOSCOPY WITH CO2 INSUFFLATION;  Surgeon: Francisco Grigsby MD;  Location:  OR       PREVIOUS ENDOSCOPY:  See above.    ALLERGIES:     Allergies   Allergen Reactions     Morphine Sulfate      Severe hallucinations     Hydrocodone-Acetaminophen Itching       PERTINENT MEDICATIONS:    Current Outpatient Medications:      acetaminophen (ACETAMINOPHEN EXTRA STRENGTH) 500 MG tablet, Take 1-2 tablets (500-1,000 mg) by mouth every 6 hours as needed for mild pain For additional refills, please schedule a follow-up appointment at [phone], Disp: 120 tablet, Rfl: 0     baclofen (LIORESAL) 10 MG tablet, Take 1 tablet (10 mg) by mouth 2 times daily, Disp: 90 tablet, Rfl: 1     benzoyl peroxide 10 % topical liquid, Use daily as directed when showering and be careful about bleaching of towels, Disp: 227 g, Rfl: 3     cholecalciferol (VITAMIN D3) 5000 units (125 mcg) capsule, TAKE ONE CAPSULE BY MOUTH ONCE DAILY, Disp: 30 capsule, Rfl: 3     FOLIC ACID PO, Take 400 mcg by mouth daily, Disp: , Rfl:      gabapentin (NEURONTIN) 300 MG capsule, Take 1 capsule (300 mg) by mouth At Bedtime, Disp: 30 capsule, Rfl: 1     losartan (COZAAR) 100 MG tablet, Take 1 tablet (100 mg) by mouth daily,  Disp: 90 tablet, Rfl: 3     NARCAN nasal spray, , Disp: , Rfl:      order for DME, Equipment being ordered: Digital home blood pressure monitor kit, Disp: 1 Units, Rfl: 0     oxyCODONE (OXYCONTIN) 10 MG 12 hr tablet, Take 10 mg by mouth every 12 hours, Disp: , Rfl:      oxyCODONE-acetaminophen (PERCOCET)  MG per tablet, Take 1 tablet by mouth 2 times daily, Disp: , Rfl:      oxyCODONE-acetaminophen (PERCOCET) 5-325 MG per tablet, Take 1 tablet by mouth every 6 hours as needed for moderate to severe pain, Disp: 120 tablet, Rfl: 0     sildenafil (VIAGRA) 50 MG tablet, Take 1 tablet (50 mg) by mouth daily as needed (erectile dysfunction), Disp: 10 tablet, Rfl: 1     sulfaSALAzine (AZULFIDINE) 500 MG tablet, Take 2 tablets (1,000 mg) by mouth 2 times daily, Disp: 180 tablet, Rfl: 0    Current Facility-Administered Medications:      lidocaine 1% with EPINEPHrine 1:100,000 injection 3 mL, 3 mL, Intradermal, Once, Julianne Roberts MD    SOCIAL HISTORY:  Social History     Socioeconomic History     Marital status:      Spouse name: Not on file     Number of children: Not on file     Years of education: Not on file     Highest education level: Not on file   Occupational History     Not on file   Social Needs     Financial resource strain: Not on file     Food insecurity     Worry: Not on file     Inability: Not on file     Transportation needs     Medical: Not on file     Non-medical: Not on file   Tobacco Use     Smoking status: Never Smoker     Smokeless tobacco: Never Used   Substance and Sexual Activity     Alcohol use: No     Drug use: No     Sexual activity: Not on file   Lifestyle     Physical activity     Days per week: Not on file     Minutes per session: Not on file     Stress: Not on file   Relationships     Social connections     Talks on phone: Not on file     Gets together: Not on file     Attends [employer] service: Not on file     Active member of club or organization: Not on file      Attends meetings of clubs or organizations: Not on file     Relationship status: Not on file     Intimate partner violence     Fear of current or ex partner: Not on file     Emotionally abused: Not on file     Physically abused: Not on file     Forced sexual activity: Not on file   Other Topics Concern     Parent/sibling w/ CABG, MI or angioplasty before 65F 55M? Not Asked   Social History Narrative    Lives in [place], 4 children.  Lives with daughter.  On disability.   vetran       FAMILY HISTORY:  Family History   Problem Relation Age of Onset     Alzheimer Disease Mother      Skin Cancer No family hx of      Melanoma No family hx of        Past/family/social history reviewed and no changes    PHYSICAL EXAMINATION:  Constitutional: aaox3, cooperative, pleasant, not dyspneic/diaphoretic, no acute distress  Vitals reviewed: There were no vitals taken for this visit.  Wt:   Wt Readings from Last 2 Encounters:   08/15/19 93 kg (205 lb)   08/02/19 92.1 kg (203 lb)      GENERAL: Healthy, alert and no distress  EYES: Eyes grossly normal to inspection.  No discharge or erythema, or obvious scleral/conjunctival abnormalities.  RESP: No audible wheeze, cough, or visible cyanosis.  No visible retractions or increased work of breathing.    SKIN: Visible skin clear. No significant rash, abnormal pigmentation or lesions.  NEURO: Cranial nerves grossly intact.  Mentation and speech appropriate for age.  PSYCH: Mentation appears normal, affect normal/bright, judgement and insight intact, normal speech and appearance well-groomed.        PERTINENT STUDIES:  Most recent CBC:  Recent Labs   Lab Test 08/15/19  1050 03/11/19  1630   WBC 6.6 8.0   HGB 14.0 14.5   HCT 42.8 43.0    239     Most recent hepatic panel:  Recent Labs   Lab Test 08/15/19  1050 03/11/19  1630   ALT 40 31   AST 23 20     Most recent creatinine:  Recent Labs   Lab Test 08/15/19  1050 03/11/19  1630   CR 1.03 0.97

## 2020-11-24 NOTE — LETTER
2020       RE: Parrish Muir  [address]  [place] [address]      Dear Colleague,    Thank you for referring your patient, Parrish Muir, to the [facility] [place]. Please see a copy of my visit note below.    IBD CLINIC VISIT     CC/REFERRING MD:  Referred Self  REASON FOR CONSULTATION: Follow up UC pancolitis    IBD HISTORY  Age at diagnosis: 38 ()  Extent of disease: Pancolitis  Current UC medications:   -- Balsalazide 2.25g TID (changed from Lialda to delzicol to balsalazide in 2019 due to insurance changes)  Prior UC surgeries: None  Prior IBD Medications:  -- Lialda 4.8g daily (changed 2019 to balsalazide due to insurance change)  -- Rowasa enemas (rx'd 2018 and 3/2019)    DRUG MONITORING  TPMT enzyme activity: N/A    6-TGN/6-MMPN levels: N/A    Biologic concentration: N/A    DISEASE ASSESSMENT  Labs:  Recent Labs   Lab Test 08/15/19  1050 10/17/18  1022   CRP <2.9 <2.9   SED 6 6     Endoscopic assessment:   -- Colonoscopy (2018): Esparza 2 inflammation in distal sigmoid and rectum otherwise normal endoscopic exam. 2mm polyp in cecum resected but not retrieved. No dysplasia biopsies obtained due to patient intolerance of procedure with MAC and poor abdominal tone.  Enterography: N/A  Fecal calprotectin: Not done  C diff: Not done    Esparza score:   Stool freq: 0 (baseline stools frequency)  Rectal bleedin (None)  PGA: 0 (normal)  Endoscopy: Not done    Remission: <3   Mild disease: 3-5  Moderate disease: 6-10  Severe disease: >10    ASSESSMENT/PLAN  Mr. Muir is a 55 year old male w/ h/o UC pan-colitis since  currently on PO 5-ASA monotherapy, chronic pain syndrome on chronic narcotic pain medications HTN, depression who presents for follow up of UC.     #. UC pancolitis:  In clinical remission on balsalazide monotherapy. Last endoscopic assessment 2018 with Esparza 2 disease in rectosigmoid colon. Plan for colonoscopy for disease  re-assessment and dysplasia surveillance.    Recommendations:  -- Schedule labs in the next month: BMP, LFTs, CBC with diff, ESR, CRP  -- Continue Balsalazide 2.25g TID (refills sent)  -- Start taking miralax, one packet (17g) twice daily for a goal of 1-2 soft bowel movements daily, you can increase by one packet (17g) daily every 2-3 days if you are not achieving your goal of 1-2 soft bowel movements daily.   -- Schedule colonoscopy for disease assessment and dysplasia surveillance in the OR at [facility] (patient intolerant of MAC due to redundant colon and intolerance of abdominal distention under MAC sedation - patient had wretching and vomiting x2 8/2018)  --For your colonoscopy we recommend the following:    -- Increase your miralax to two packets twice daily the week before your colonoscopy   -- Low fiber diet the week before your colonoscopy   -- Clear liquid diet only the day before your colonoscopy    -- Follow the instructions for your miralax bowel preparation that you should receive before your procedure  -- We discussed the importance of remaining on the patient's current immunosuppressive therapy.  Discussed that the risks of coronavirus on these medications were small.  Discussed that should medications be interrupted, there could be a flare of the underlying inflammatory bowel disease which could require prednisone or hospitalization, both of which would likely increase the risk of coronavirus beyond that of the current medications.  Also discussed that should we enter a medications there is no guarantee that they would be effective again.  Finally we discussed the importance of hand hygiene, social isolation, and following all routine recommendations from the CDC.    #. IBD healthcare maintenance:     Colon cancer surveillance   -- No PSC or known FH CRC, intolerant of MAC sedation. Due for completion of dysplasia surveillance.    Bone mineral density screening   -- Recommend all patients  supplement with calcium and vitamin D    Vaccinations  -- Yearly influenza vaccination: Due now  -- Hepatitis B vaccination: Titer to be checked  -- Hepatitis A vaccination: Titer to be checked  -- TDaP: Last Given 6/2018  -- Pneumonia vaccination once and 5 year booster: Note done (no systemic immunosuppression)    Not immunocompromised:  -- Varicella vaccination:   -- Zoster vaccination: Recommend to give if over 60.  Recommend to discuss if over 50.  Some authorities recommend to administer to patients > 50 due to increased risk of immunosuppression.      Misc  -- Avoid tobacco use  -- Avoid NSAIDs as there is potentially a 25% chance of causing an IBD flare  -- Recommend yearly skin cancer exam, especially if on, or previously exposed to, an immunomodulator or biologic.     RTC 6 months    Thank you for this consultation.  It was a pleasure to participate in the care of this patient; please contact us with any further questions.  A total of 37 minutes was spent with this patient, >50% of which was counseling regarding the above delineated issues.    Rocio Vee MD  GI Fellow, PGY-6  Division of Gastroenterology, Hepatology and Nutrition  [facility]  [phone]      HPI:   Currently, states that he has been doing relatively well. He is currently having one large, formed bowel movement every other day without any blood. Denies any straining with bowel movements and does not need to sit on the toilet for more than 5 minutes to pass bowel movements. He has discomfort in the low abdomen that builds up before he has his bowel movements, the discomfort does eventually go away 8-10 hours after having a bowel movement. He has not tried any laxatives or fiber for these symptoms. For his chronic pain he is on two 10mg oxyconton daily and is also taking vicodin - he states that these doses are chronic and stable for the past 10 years.    States he has been out of the balsalazide for the past 5 days.      Denies any fevers, chills, tenesmus, nausea, vomiting, weight loss. No fecal incontinence, urgency, nocturnal stool or perinal disease.    Denies any EIM.    ROS:    No fevers or chills  No weight loss  No blurry vision, double vision or change in vision  No sore throat  No lymphadenopathy  No headache, paraesthesias, or weakness in a limb  No shortness of breath or wheezing  No chest pain or pressure  No arthralgias or myalgias  No rashes or skin changes  No odynophagia or dysphagia  No BRBPR, hematochezia, melena  No dysuria, frequency or urgency  No hot/cold intolerance or polyria  No anxiety or depression    Extra intestinal manifestations of IBD:  No uveitis/episcleritis  No aphthous ulcers   No arthritis   No erythema nodosum/pyoderma gangrenosum.     PERTINENT PAST MEDICAL HISTORY:  Past Medical History:   Diagnosis Date     Depression      Hypertension        PREVIOUS SURGERIES:  Past Surgical History:   Procedure Laterality Date     COLONOSCOPY WITH CO2 INSUFFLATION N/A 11/6/2015    Procedure: COLONOSCOPY WITH CO2 INSUFFLATION;  Surgeon: Francisco Grigsby MD;  Location:  OR       PREVIOUS ENDOSCOPY:  See above.    ALLERGIES:     Allergies   Allergen Reactions     Morphine Sulfate      Severe hallucinations     Hydrocodone-Acetaminophen Itching       PERTINENT MEDICATIONS:    Current Outpatient Medications:      acetaminophen (ACETAMINOPHEN EXTRA STRENGTH) 500 MG tablet, Take 1-2 tablets (500-1,000 mg) by mouth every 6 hours as needed for mild pain For additional refills, please schedule a follow-up appointment at [phone], Disp: 120 tablet, Rfl: 0     baclofen (LIORESAL) 10 MG tablet, Take 1 tablet (10 mg) by mouth 2 times daily, Disp: 90 tablet, Rfl: 1     benzoyl peroxide 10 % topical liquid, Use daily as directed when showering and be careful about bleaching of towels, Disp: 227 g, Rfl: 3     cholecalciferol (VITAMIN D3) 5000 units (125 mcg) capsule, TAKE ONE CAPSULE BY MOUTH ONCE DAILY, Disp: 30  capsule, Rfl: 3     FOLIC ACID PO, Take 400 mcg by mouth daily, Disp: , Rfl:      gabapentin (NEURONTIN) 300 MG capsule, Take 1 capsule (300 mg) by mouth At Bedtime, Disp: 30 capsule, Rfl: 1     losartan (COZAAR) 100 MG tablet, Take 1 tablet (100 mg) by mouth daily, Disp: 90 tablet, Rfl: 3     NARCAN nasal spray, , Disp: , Rfl:      order for DME, Equipment being ordered: Digital home blood pressure monitor kit, Disp: 1 Units, Rfl: 0     oxyCODONE (OXYCONTIN) 10 MG 12 hr tablet, Take 10 mg by mouth every 12 hours, Disp: , Rfl:      oxyCODONE-acetaminophen (PERCOCET)  MG per tablet, Take 1 tablet by mouth 2 times daily, Disp: , Rfl:      oxyCODONE-acetaminophen (PERCOCET) 5-325 MG per tablet, Take 1 tablet by mouth every 6 hours as needed for moderate to severe pain, Disp: 120 tablet, Rfl: 0     sildenafil (VIAGRA) 50 MG tablet, Take 1 tablet (50 mg) by mouth daily as needed (erectile dysfunction), Disp: 10 tablet, Rfl: 1     sulfaSALAzine (AZULFIDINE) 500 MG tablet, Take 2 tablets (1,000 mg) by mouth 2 times daily, Disp: 180 tablet, Rfl: 0    Current Facility-Administered Medications:      lidocaine 1% with EPINEPHrine 1:100,000 injection 3 mL, 3 mL, Intradermal, Once, Julianne Roberts MD    SOCIAL HISTORY:  Social History     Socioeconomic History     Marital status:      Spouse name: Not on file     Number of children: Not on file     Years of education: Not on file     Highest education level: Not on file   Occupational History     Not on file   Social Needs     Financial resource strain: Not on file     Food insecurity     Worry: Not on file     Inability: Not on file     Transportation needs     Medical: Not on file     Non-medical: Not on file   Tobacco Use     Smoking status: Never Smoker     Smokeless tobacco: Never Used   Substance and Sexual Activity     Alcohol use: No     Drug use: No     Sexual activity: Not on file   Lifestyle     Physical activity     Days per week: Not on  file     Minutes per session: Not on file     Stress: Not on file   Relationships     Social connections     Talks on phone: Not on file     Gets together: Not on file     Attends Evangelical service: Not on file     Active member of club or organization: Not on file     Attends meetings of clubs or organizations: Not on file     Relationship status: Not on file     Intimate partner violence     Fear of current or ex partner: Not on file     Emotionally abused: Not on file     Physically abused: Not on file     Forced sexual activity: Not on file   Other Topics Concern     Parent/sibling w/ CABG, MI or angioplasty before 65F 55M? Not Asked   Social History Narrative    Lives in [place], 4 children.  Lives with daughter.  On disability.   vetran       FAMILY HISTORY:  Family History   Problem Relation Age of Onset     Alzheimer Disease Mother      Skin Cancer No family hx of      Melanoma No family hx of        Past/family/social history reviewed and no changes    PHYSICAL EXAMINATION:  Constitutional: aaox3, cooperative, pleasant, not dyspneic/diaphoretic, no acute distress  Vitals reviewed: There were no vitals taken for this visit.  Wt:   Wt Readings from Last 2 Encounters:   08/15/19 93 kg (205 lb)   08/02/19 92.1 kg (203 lb)      GENERAL: Healthy, alert and no distress  EYES: Eyes grossly normal to inspection.  No discharge or erythema, or obvious scleral/conjunctival abnormalities.  RESP: No audible wheeze, cough, or visible cyanosis.  No visible retractions or increased work of breathing.    SKIN: Visible skin clear. No significant rash, abnormal pigmentation or lesions.  NEURO: Cranial nerves grossly intact.  Mentation and speech appropriate for age.  PSYCH: Mentation appears normal, affect normal/bright, judgement and insight intact, normal speech and appearance well-groomed.        PERTINENT STUDIES:  Most recent CBC:  Recent Labs   Lab Test 08/15/19  1050 03/11/19  1630   WBC 6.6 8.0   HGB 14.0  "14.5   HCT 42.8 43.0    239     Most recent hepatic panel:  Recent Labs   Lab Test 08/15/19  1050 03/11/19  1630   ALT 40 31   AST 23 20     Most recent creatinine:  Recent Labs   Lab Test 08/15/19  1050 03/11/19  1630   CR 1.03 0.97         Parrish Muir is a 55 year old male who is being evaluated via a billable video visit.      The patient has been notified of following:     \"This video visit will be conducted via a call between you and your physician/provider. We have found that certain health care needs can be provided without the need for an in-person physical exam.  This service lets us provide the care you need with a video conversation.  If a prescription is necessary we can send it directly to your pharmacy.  If lab work is needed we can place an order for that and you can then stop by our lab to have the test done at a later time.    Video visits are billed at different rates depending on your insurance coverage.  Please reach out to your insurance provider with any questions.    If during the course of the call the physician/provider feels a video visit is not appropriate, you will not be charged for this service.\"    Patient has given verbal consent for Video visit? Yes  How would you like to obtain your AVS? MyChart  If you are dropped from the video visit, the video invite should be resent to: Text to cell phone: [phone]  Will anyone else be joining your video visit? No    Video-Visit Details    Type of service:  Video Visit    Video Start Time: 4:30 PM  Video End Time: 5:07 PM    Originating Location (pt. Location): Home    Distant Location (provider location):  [facility] [place]     Platform used for Video Visit: Treva Vee MD      "

## 2020-11-25 RX ORDER — BALSALAZIDE DISODIUM 750 MG/1
2250 CAPSULE ORAL 3 TIMES DAILY
Qty: 270 CAPSULE | Refills: 1 | Status: SHIPPED | OUTPATIENT
Start: 2020-11-25 | End: 2023-05-30 | Stop reason: SINTOL

## 2020-12-11 ENCOUNTER — TELEPHONE (OUTPATIENT)
Dept: GASTROENTEROLOGY | Facility: OUTPATIENT CENTER | Age: 55
End: 2020-12-11

## 2020-12-11 ENCOUNTER — TELEPHONE (OUTPATIENT)
Dept: GASTROENTEROLOGY | Facility: CLINIC | Age: 55
End: 2020-12-11

## 2020-12-11 NOTE — TELEPHONE ENCOUNTER
"1st schedule attempt - Colonoscopy with Dr. Samantha GILBERT under General Anesthesia - 12/18-12/24    Per Hiral Sim, RN:    \"[Dr. Singh] does prefer morning time of 9 or 10 while she is on service.\"      Procedure: Lower Endoscopy Schedule with Dr. Singh at [facility] week of 12/18-12/24, General in the OR, dysplasia surveillance and disease assessment. Please send Miralax bowel prep with two duclolax supp (Patient request no golytely).   Lower Endoscopy Type: Colonoscopy    Purpose of Colonoscopy Procedure: Screening    Colonoscopy Sedation: General    Preferred Location: [facility]    Scheduling Instructions: If you have not heard from the scheduling office within 2 business days, please call [phone].        "

## 2020-12-11 NOTE — TELEPHONE ENCOUNTER
Pt called to express interest in scheduling appointment for colonoscopy. Informed pt I will look into scheduling and call pt back with a date once confirmed with the OR. Pending date:12/18/2020 @10:00am procedure time, 8:30am arrival time w/ Shmidt in the OR under GENERAL; waiting on Fer from OR to confirm we can give to patient. Pt to wait to hear from me till then.

## 2020-12-14 ENCOUNTER — HOSPITAL ENCOUNTER (OUTPATIENT)
Facility: CLINIC | Age: 55
End: 2020-12-14
Attending: INTERNAL MEDICINE | Admitting: INTERNAL MEDICINE
Payer: COMMERCIAL

## 2020-12-14 ENCOUNTER — TELEPHONE (OUTPATIENT)
Dept: GASTROENTEROLOGY | Facility: OUTPATIENT CENTER | Age: 55
End: 2020-12-14

## 2020-12-14 ENCOUNTER — MYC MEDICAL ADVICE (OUTPATIENT)
Dept: CALL CENTER | Age: 55
End: 2020-12-14

## 2020-12-14 DIAGNOSIS — Z11.59 ENCOUNTER FOR SCREENING FOR OTHER VIRAL DISEASES: Primary | ICD-10-CM

## 2020-12-14 NOTE — TELEPHONE ENCOUNTER
Stef from OR has booked pt for Room 17 for a Colonoscopy wNanci Singh under GENERAL anesthesia. Called pt and lvm to confirm apptmt is booked. Informed pt if it doesn't work for him to call us back to resched.

## 2020-12-14 NOTE — TELEPHONE ENCOUNTER
Called pt again to [facility] in regards to needing a Covid test prior to 12/18/2020 procedure 3-4 days prior.

## 2020-12-15 ENCOUNTER — TELEPHONE (OUTPATIENT)
Dept: GASTROENTEROLOGY | Facility: OUTPATIENT CENTER | Age: 55
End: 2020-12-15

## 2020-12-15 NOTE — TELEPHONE ENCOUNTER
Patient is aware that per our facility guidelines only nose swab is applicable for the covid test. He understands and has decided to cancel his procedure originally scheduled for 12/18/2020 w/ Shmidt under general anesthesia for a Colonoscopy.     Caller:patient    Reason for cancel?Cx 12/18/2020 Shmidt-colonscopy under general in the OR    Rescheduled? N    Will patient call back to reschedule?Not at the moment.

## 2020-12-15 NOTE — TELEPHONE ENCOUNTER
Pt is calling to confirm 12/18/2020 appointment however needs to run it by daughter for a Chaperone. He states he is unwilling to do a nose swab for the covid test. He states he has a saliva testing in [place] scheduled for noon. Informed pt I will message Dr. Singh in regards to that. Pt is to wait to hear from me in updates to a response from Dr. Singh in regards to this situation.

## 2021-01-10 ENCOUNTER — HEALTH MAINTENANCE LETTER (OUTPATIENT)
Age: 56
End: 2021-01-10

## 2021-02-01 ENCOUNTER — OFFICE VISIT (OUTPATIENT)
Dept: INTERNAL MEDICINE | Facility: CLINIC | Age: 56
End: 2021-02-01
Payer: COMMERCIAL

## 2021-02-01 ENCOUNTER — APPOINTMENT (OUTPATIENT)
Dept: LAB | Facility: CLINIC | Age: 56
End: 2021-02-01
Payer: COMMERCIAL

## 2021-02-01 VITALS
BODY MASS INDEX: 32.49 KG/M2 | HEART RATE: 78 BPM | WEIGHT: 220 LBS | OXYGEN SATURATION: 98 % | DIASTOLIC BLOOD PRESSURE: 99 MMHG | SYSTOLIC BLOOD PRESSURE: 143 MMHG

## 2021-02-01 DIAGNOSIS — R35.0 BENIGN PROSTATIC HYPERPLASIA WITH URINARY FREQUENCY: ICD-10-CM

## 2021-02-01 DIAGNOSIS — R35.1 NOCTURIA: ICD-10-CM

## 2021-02-01 DIAGNOSIS — N40.1 BENIGN PROSTATIC HYPERPLASIA WITH URINARY FREQUENCY: ICD-10-CM

## 2021-02-01 DIAGNOSIS — G89.4 CHRONIC PAIN SYNDROME: ICD-10-CM

## 2021-02-01 DIAGNOSIS — Z00.00 ENCOUNTER FOR ROUTINE HISTORY AND PHYSICAL EXAM FOR MALE: Primary | ICD-10-CM

## 2021-02-01 DIAGNOSIS — G43.009 MIGRAINE WITHOUT AURA AND WITHOUT STATUS MIGRAINOSUS, NOT INTRACTABLE: ICD-10-CM

## 2021-02-01 DIAGNOSIS — J31.2 CHRONIC SORE THROAT: ICD-10-CM

## 2021-02-01 LAB
ALBUMIN SERPL-MCNC: 4.2 G/DL (ref 3.4–5)
ALP SERPL-CCNC: 70 U/L (ref 40–150)
ALT SERPL W P-5'-P-CCNC: 30 U/L (ref 0–70)
ANION GAP SERPL CALCULATED.3IONS-SCNC: 4 MMOL/L (ref 3–14)
AST SERPL W P-5'-P-CCNC: 25 U/L (ref 0–45)
BILIRUB SERPL-MCNC: 0.4 MG/DL (ref 0.2–1.3)
BUN SERPL-MCNC: 12 MG/DL (ref 7–30)
CALCIUM SERPL-MCNC: 9.5 MG/DL (ref 8.5–10.1)
CHLORIDE SERPL-SCNC: 104 MMOL/L (ref 94–109)
CHOLEST SERPL-MCNC: 185 MG/DL
CO2 SERPL-SCNC: 32 MMOL/L (ref 20–32)
CREAT SERPL-MCNC: 0.9 MG/DL (ref 0.66–1.25)
GFR SERPL CREATININE-BSD FRML MDRD: >90 ML/MIN/{1.73_M2}
GLUCOSE SERPL-MCNC: 115 MG/DL (ref 70–99)
HDLC SERPL-MCNC: 65 MG/DL
LDLC SERPL CALC-MCNC: 102 MG/DL
NONHDLC SERPL-MCNC: 120 MG/DL
POTASSIUM SERPL-SCNC: 4 MMOL/L (ref 3.4–5.3)
PROT SERPL-MCNC: 8 G/DL (ref 6.8–8.8)
PSA SERPL-ACNC: 0.41 UG/L (ref 0–4)
SODIUM SERPL-SCNC: 140 MMOL/L (ref 133–144)
TRIGL SERPL-MCNC: 90 MG/DL

## 2021-02-01 PROCEDURE — 99396 PREV VISIT EST AGE 40-64: CPT | Performed by: PEDIATRICS

## 2021-02-01 PROCEDURE — G0103 PSA SCREENING: HCPCS | Performed by: PATHOLOGY

## 2021-02-01 PROCEDURE — 80061 LIPID PANEL: CPT | Performed by: PATHOLOGY

## 2021-02-01 PROCEDURE — 36415 COLL VENOUS BLD VENIPUNCTURE: CPT | Performed by: PATHOLOGY

## 2021-02-01 PROCEDURE — 80053 COMPREHEN METABOLIC PANEL: CPT | Performed by: PATHOLOGY

## 2021-02-01 RX ORDER — FLUTICASONE PROPIONATE 50 MCG
1 SPRAY, SUSPENSION (ML) NASAL DAILY
Qty: 1 G | Refills: 3 | Status: SHIPPED | OUTPATIENT
Start: 2021-02-01 | End: 2023-01-10

## 2021-02-01 RX ORDER — TAMSULOSIN HYDROCHLORIDE 0.4 MG/1
0.4 CAPSULE ORAL DAILY
Qty: 30 CAPSULE | Refills: 3 | Status: SHIPPED | OUTPATIENT
Start: 2021-02-01 | End: 2022-01-04

## 2021-02-01 RX ORDER — ACETAMINOPHEN 500 MG
500-1000 TABLET ORAL EVERY 6 HOURS PRN
Qty: 120 TABLET | Refills: 0 | Status: SHIPPED | OUTPATIENT
Start: 2021-02-01 | End: 2021-05-12

## 2021-02-01 ASSESSMENT — ANXIETY QUESTIONNAIRES
2. NOT BEING ABLE TO STOP OR CONTROL WORRYING: SEVERAL DAYS
6. BECOMING EASILY ANNOYED OR IRRITABLE: SEVERAL DAYS
IF YOU CHECKED OFF ANY PROBLEMS ON THIS QUESTIONNAIRE, HOW DIFFICULT HAVE THESE PROBLEMS MADE IT FOR YOU TO DO YOUR WORK, TAKE CARE OF THINGS AT HOME, OR GET ALONG WITH OTHER PEOPLE: SOMEWHAT DIFFICULT
1. FEELING NERVOUS, ANXIOUS, OR ON EDGE: SEVERAL DAYS
5. BEING SO RESTLESS THAT IT IS HARD TO SIT STILL: NOT AT ALL
3. WORRYING TOO MUCH ABOUT DIFFERENT THINGS: SEVERAL DAYS
GAD7 TOTAL SCORE: 5
7. FEELING AFRAID AS IF SOMETHING AWFUL MIGHT HAPPEN: NOT AT ALL

## 2021-02-01 ASSESSMENT — ENCOUNTER SYMPTOMS
DIARRHEA: 0
DECREASED CONCENTRATION: 0
HEARTBURN: 0
ABDOMINAL PAIN: 1
NAUSEA: 0
RECTAL PAIN: 0
INSOMNIA: 1
VOMITING: 0
BLOATING: 1
BOWEL INCONTINENCE: 0
DEPRESSION: 1
NERVOUS/ANXIOUS: 1
CONSTIPATION: 0
JAUNDICE: 0
PANIC: 0
BLOOD IN STOOL: 1

## 2021-02-01 ASSESSMENT — PATIENT HEALTH QUESTIONNAIRE - PHQ9
5. POOR APPETITE OR OVEREATING: SEVERAL DAYS
SUM OF ALL RESPONSES TO PHQ QUESTIONS 1-9: 6

## 2021-02-01 ASSESSMENT — PAIN SCALES - GENERAL: PAINLEVEL: MODERATE PAIN (5)

## 2021-02-01 NOTE — PATIENT INSTRUCTIONS
Primary Care Center Medication Refill Request Information:  * Please contact your pharmacy regarding ANY request for medication refills.  ** [facility] Prescription Fax = [phone]  * Please allow 3 business days for routine medication refills.  * Please allow 5 business days for controlled substance medication refills.     [facility] Test Result notification information:  *You will be notified with in 7-10 days of your appointment day regarding the results of your test.  If you are on MyChart you will be notified as soon as the provider has reviewed the results and signed off on them.    Primary Care Center: [phone]     Urology [phone] (4th [facility]

## 2021-02-01 NOTE — NURSING NOTE
Chief Complaint   Patient presents with     Physical     pt here for physical and med refills       Malissa Chen CMA, EMT at 1:17 PM on 2/1/2021.

## 2021-02-01 NOTE — PROGRESS NOTES
3  SUBJECTIVE:   CC: Parrish Muir is an 55 year old male who presents for preventive health visit.  Pt has a hx of ulcerative colitis, depression, chronic pain, headaches, HTN and is due for his surveillance colonoscopy.   He has been having some blood in his stools, abdominal cramping.  GI is attempting to schedule his colonoscopy, he will require a pre-procedure COVID test. He wonders if his bleeding with stools is from a hemorrhoid.   He has a hx of HTN, his blood pressure normally runs 130's/60-80's, he has a machine at home.  He continues to go to a pain clinic for his chronic pain related to back pain, headaches.      He lives in public housing, lives in a town house.  He has been having trouble keeping up with the dishes due to pain with standing.      Healthy Habits:    Do you get at least three servings of calcium containing foods daily (dairy, green leafy vegetables, etc.)? yes    Amount of exercise or daily activities, outside of work: 20- 40 minutes per day of walking  per day    Problems taking medications regularly No    Medication side effects: No    Have you had an eye exam in the past two years? yes    Do you see a dentist twice per year? yes    Do you have sleep apnea, excessive snoring or daytime drowsiness?no      Past Medical History:   Diagnosis Date     Chronic pain syndrome     Followed by Gokul Negrete MD at a Pain Clinic in [place].     Depression      Episodic tension-type headache, not intractable      Hypertension      Ulcerative colitis (H) 2003        Today's PHQ-2 Score: PHQ 9 = 6, SILVIA 7 = 5 2/1/2021   PHQ-2 ( 1999 Pfizer) 8/17/2018 3/27/2015   Q1: Little interest or pleasure in doing things 1 3   Q2: Feeling down, depressed or hopeless 1 3   PHQ-2 Score 2 6       Abuse: Current or Past(Physical, Sexual or Emotional)- No  Do you feel safe in your environment? Yes    Have you ever done Advance Care Planning? (For example, a Health Directive, POLST, or a discussion with a medical  provider or your loved ones about your wishes): No, advance care planning information given to patient to review.  Advanced care planning was discussed at today's visit.    Social History     Tobacco Use     Smoking status: Never Smoker     Smokeless tobacco: Never Used   Substance Use Topics     Alcohol use: No     If you drink alcohol do you typically have >3 drinks per day or >7 drinks per week? No                      Last PSA:   PSA   Date Value Ref Range Status   06/25/2019 0.35 0 - 4 ug/L Final     Comment:     Assay Method:  Chemiluminescence using Siemens Vista analyzer       Reviewed orders with patient. Reviewed health maintenance and updated orders accordingly - Yes  BP Readings from Last 3 Encounters:   02/01/21 (!) 143/99   08/15/19 134/83   08/12/19 116/78    Wt Readings from Last 3 Encounters:   02/01/21 99.8 kg (220 lb)   11/24/20 90.7 kg (200 lb)   08/15/19 93 kg (205 lb)                    Reviewed and updated as needed this visit by clinical staff  Tobacco  Allergies  Meds              Reviewed and updated as needed this visit by Provider                Past Medical History:   Diagnosis Date     Chronic pain syndrome     Followed by Gokul Negrete MD at a Pain Clinic in [place].     Depression      Episodic tension-type headache, not intractable      Hypertension      Ulcerative colitis (H) 2003      Past Surgical History:   Procedure Laterality Date     COLONOSCOPY WITH CO2 INSUFFLATION N/A 11/6/2015    Procedure: COLONOSCOPY WITH CO2 INSUFFLATION;  Surgeon: Francisco Grigsby MD;  Location:  OR       ROS:  CONSTITUTIONAL: NEGATIVE for fever, chills, change in weight  INTEGUMENTARY/SKIN: NEGATIVE for worrisome rashes, moles or lesions  EYES: NEGATIVE for vision changes or irritation  ENT: POSITIVE for sore throat and swollen glands  RESP: NEGATIVE for significant cough or SOB  CV: NEGATIVE for chest pain, palpitations or peripheral edema  GI: Hx GERD   male: erectile dysfunction, frequency,  decreased urinary stream and terminal dribbling; he urinates 3-4 times per night which disrupts his sleep.   MUSCULOSKELETAL: NEGATIVE for significant arthralgias or myalgia  NEURO: NEGATIVE for weakness, dizziness or paresthesias  HEME/ALLERGY/IMMUNE: NEGATIVE for bleeding problems  PSYCHIATRIC: NEGATIVE for changes in mood or affect    OBJECTIVE:   BP (!) 143/99 (BP Location: Right arm, Patient Position: Sitting, Cuff Size: Adult Regular)   Pulse 78   Wt 99.8 kg (220 lb)   SpO2 98%   BMI 32.49 kg/m    EXAM:  GENERAL: healthy, alert and no distress  EYES: Eyes grossly normal to inspection, PERRL and conjunctivae and sclerae normal  HENT: normal cephalic/atraumatic, ear canals and TM's normal, nose and mouth without ulcers or lesions, oral mucous membranes moist and post nasal cobblestoning  NECK: no adenopathy, no asymmetry, masses, or scars and thyroid normal to palpation  RESP: lungs clear to auscultation - no rales, rhonchi or wheezes  CV: regular rate and rhythm, normal S1 S2, no S3 or S4, no murmur, click or rub, no peripheral edema and peripheral pulses strong  ABDOMEN: soft, nontender, no hepatosplenomegaly, no masses and bowel sounds normal  MS: no gross musculoskeletal defects noted, no edema  SKIN: no suspicious lesions or rashes and hyperpigmentation - abdomen and back  NEURO: Normal strength and tone, mentation intact and speech normal  PSYCH: mentation appears normal, affect normal/bright    Diagnostic Test Results:  Labs reviewed in Epic    ASSESSMENT/PLAN:   1. Chronic pain syndrome  Followed by outside pain clinic, prescribed narcotics and Narcan    2. Migraine without aura and without status migrainosus, not intractable  Pt requests refill for Acetaminophen  - acetaminophen (ACETAMINOPHEN EXTRA STRENGTH) 500 MG tablet; Take 1-2 tablets (500-1,000 mg) by mouth every 6 hours as needed for mild pain For additional refills, please schedule a follow-up appointment at [phone]  Dispense: 120  "tablet; Refill: 0    3. Nocturia  PSA normal.  Referred to Urology.  Trial of Flomax to see if this impacts night time symptoms.   - PSA screen  - UROLOGY ADULT REFERRAL    4. Encounter for routine history and physical exam for male  Discussed weight loss, lipid panel reasonably well controlled  - Comprehensive metabolic panel  - Lipid Profile NON-FASTING    5. Benign prostatic hyperplasia with urinary frequency  As above  - tamsulosin (FLOMAX) 0.4 MG capsule; Take 1 capsule (0.4 mg) by mouth daily  Dispense: 30 capsule; Refill: 3    6. Chronic sore throat  Trial of Flonase to see if this helps with patients sore throat symptoms.   - fluticasone (FLONASE) 50 MCG/ACT nasal spray; Spray 1 spray into both nostrils daily  Dispense: 1 g; Refill: 3     COUNSELING:  Reviewed preventive health counseling, as reflected in patient instructions       Regular exercise       Healthy diet/nutrition       Colon cancer screening       Advance Care Planning    Estimated body mass index is 32.49 kg/m  as calculated from the following:    Height as of 11/24/20: 1.753 m (5' 9\").    Weight as of this encounter: 99.8 kg (220 lb).    Weight management plan: Discussed healthy diet and exercise guidelines    He reports that he has never smoked. He has never used smokeless tobacco.      Counseling Resources:  ATP IV Guidelines  Pooled Cohorts Equation Calculator  FRAX Risk Assessment  ICSI Preventive Guidelines  Dietary Guidelines for Americans, 2010  iMove's MyPlate  ASA Prophylaxis  Lung CA Screening    Seb Lafleur MD  [facility] INTERNAL MEDIC[facility] [place]  Answers for HPI/ROS submitted by the patient on 2/1/2021   General Symptoms: No  Skin Symptoms: No  HENT Symptoms: No  EYE SYMPTOMS: No  HEART SYMPTOMS: No  LUNG SYMPTOMS: No  INTESTINAL SYMPTOMS: Yes  URINARY SYMPTOMS: No  REPRODUCTIVE SYMPTOMS: No  SKELETAL SYMPTOMS: No  BLOOD SYMPTOMS: No  NERVOUS SYSTEM SYMPTOMS: No  MENTAL HEALTH SYMPTOMS: Yes  Heart burn or " indigestion: No  Nausea: No  Vomiting: No  Abdominal pain: Yes  Bloating: Yes  Constipation: No  Diarrhea: No  Blood in stool: Yes  Black stools: No  Rectal or Anal pain: No  Fecal incontinence: No  Yellowing of skin or eyes: No  Vomit with blood: No  Change in stools: No  Nervous or Anxious: Yes  Depression: Yes  Trouble sleeping: Yes  Trouble thinking or concentrating: No  Mood changes: Yes  Panic attacks: No

## 2021-02-01 NOTE — LETTER
Parrish Muir  [address] [place] [address]  : 1965  MRN:  [id number]      2021          To Whom it May Concern:    Parrish Muir (1965) is under my medical care.  He has chronic back pain and has difficulty standing for long periods, such as with washing dishes.  If he qualifies, he may benefit from having some home assistance with washing dishes to reduce his pain issues related to this.      Please call for any questions.      Seb Lafleur MD

## 2021-02-02 ASSESSMENT — ANXIETY QUESTIONNAIRES: GAD7 TOTAL SCORE: 5

## 2021-02-23 ENCOUNTER — VIRTUAL VISIT (OUTPATIENT)
Dept: UROLOGY | Facility: CLINIC | Age: 56
End: 2021-02-23
Attending: PEDIATRICS
Payer: COMMERCIAL

## 2021-02-23 DIAGNOSIS — N52.9 ERECTILE DYSFUNCTION, UNSPECIFIED ERECTILE DYSFUNCTION TYPE: ICD-10-CM

## 2021-02-23 DIAGNOSIS — R39.9 LOWER URINARY TRACT SYMPTOMS (LUTS): Primary | ICD-10-CM

## 2021-02-23 PROCEDURE — 99204 OFFICE O/P NEW MOD 45 MIN: CPT | Mod: GT | Performed by: UROLOGY

## 2021-02-23 RX ORDER — TADALAFIL 5 MG/1
5 TABLET ORAL EVERY 24 HOURS
Qty: 30 TABLET | Refills: 11 | Status: SHIPPED | OUTPATIENT
Start: 2021-02-23 | End: 2022-02-23

## 2021-02-23 NOTE — PROGRESS NOTES
Urology Consult History and Physical  DEE DEE SALGUERO   Name: Parrish Muir    MRN: [id number]   YOB: 1965       We were asked to see Parrish Muir at the request of Dr. Lafleur for evaluation and treatment of Erectile dysfunction .        Chief Complaint:   Erectile dysfunction     History is obtained from the patient            History of Present Illness:   Parrish Muir is a 55 year old male who is being seen for evaluation of     Was previously on tadalafil (Cialis) 5mg daily   This was working well  Then changed to sildenafil 50mg (Viagra) due to insurance coverage in 2018 which has not worked well.    He was also recently started on tamsulosin 0.4mg (Flomax) on 21 for frequent evening/nocturia  He was previously having 3x/night  Now no 0-1x/night   LUTS improved    Father had prostate cancer and  at age 67 from a stroke. He did not require any treatment prostate cancer.                Past Medical History:     Past Medical History:   Diagnosis Date     Chronic pain syndrome     Followed by Gokul Negrete MD at a Pain Clinic in [place].     Depression      Episodic tension-type headache, not intractable      Hypertension      Ulcerative colitis (H)             Past Surgical History:     Past Surgical History:   Procedure Laterality Date     COLONOSCOPY WITH CO2 INSUFFLATION N/A 2015    Procedure: COLONOSCOPY WITH CO2 INSUFFLATION;  Surgeon: Francisco Grigsby MD;  Location:  OR            Social History:     Social History     Tobacco Use     Smoking status: Never Smoker     Smokeless tobacco: Never Used   Substance Use Topics     Alcohol use: No       History   Smoking Status     Never Smoker   Smokeless Tobacco     Never Used            Family History:     Family History   Problem Relation Age of Onset     Alzheimer Disease Mother      Hypertension Mother      Cerebrovascular Disease Father      Parkinsonism Father      Hypertension Father      Prostate Cancer Father       Depression Sister      Depression Brother      Depression Sister      Breast Cancer Sister      Depression Sister      Skin Cancer No family hx of      Melanoma No family hx of               Allergies:     Allergies   Allergen Reactions     Morphine Sulfate      Severe hallucinations     Hydrocodone-Acetaminophen Itching            Medications:     Current Outpatient Medications   Medication Sig     acetaminophen (ACETAMINOPHEN EXTRA STRENGTH) 500 MG tablet Take 1-2 tablets (500-1,000 mg) by mouth every 6 hours as needed for mild pain For additional refills, please schedule a follow-up appointment at [phone]     baclofen (LIORESAL) 10 MG tablet Take 1 tablet (10 mg) by mouth 2 times daily     balsalazide (COLAZAL) 750 MG capsule Take 3 capsules (2,250 mg) by mouth 3 times daily For additional refills, please schedule a follow-up appointment.     benzoyl peroxide 10 % topical liquid Use daily as directed when showering and be careful about bleaching of towels     cholecalciferol (VITAMIN D3) 5000 units (125 mcg) capsule TAKE ONE CAPSULE BY MOUTH ONCE DAILY     fluticasone (FLONASE) 50 MCG/ACT nasal spray Spray 1 spray into both nostrils daily     FOLIC ACID PO Take 400 mcg by mouth daily     gabapentin (NEURONTIN) 300 MG capsule Take 1 capsule (300 mg) by mouth At Bedtime     losartan (COZAAR) 100 MG tablet Take 1 tablet (100 mg) by mouth daily     NARCAN nasal spray      order for DME Equipment being ordered: Digital home blood pressure monitor kit     oxyCODONE (OXYCONTIN) 10 MG 12 hr tablet Take 10 mg by mouth every 12 hours     oxyCODONE-acetaminophen (PERCOCET) 5-325 MG per tablet Take 1 tablet by mouth every 6 hours as needed for moderate to severe pain     polyethylene glycol (MIRALAX) 17 GM/Dose powder Take 17 g by mouth 2 times daily For a goal of 1-2 soft bowel movements daily. Take an additional 17g if you are not achieving this.     sildenafil (VIAGRA) 50 MG tablet Take 1 tablet (50 mg) by mouth  daily as needed (erectile dysfunction)     tamsulosin (FLOMAX) 0.4 MG capsule Take 1 capsule (0.4 mg) by mouth daily     Current Facility-Administered Medications   Medication     lidocaine 1% with EPINEPHrine 1:100,000 injection 3 mL             Review of Systems:     Skin: negative  Eyes: negative  Ears/Nose/Throat: negative  Respiratory: No shortness of breath, dyspnea on exertion, cough, or hemoptysis  Cardiovascular: negative  Gastrointestinal: negative  Genitourinary: as above  Musculoskeletal: negative  Neurologic: negative  Psychiatric: negative  Hematologic/Lymphatic/Immunologic: negative  Endocrine: negative          Physical Exam:   PHYSICAL EXAM  Patient is a 55 year old  male   Vitals: There were no vitals taken for this visit.  There is no height or weight on file to calculate BMI.  General Appearance Adult:   Alert, no acute distress, oriented  HENT: throat/mouth:normal, good dentition  Lungs: no respiratory distress, or pursed lip breathing  Heart: No obvious jugular venous distension present  Abdomen: non - distended  Musculoskeltal: extremities normal, no peripheral edema  Skin: no suspicious lesions or rashes  Neuro: Alert, oriented, speech and mentation normal  Psych: affect and mood normal  Gait: Normal           Data:   All laboratory data reviewed:    UA RESULTS:  Recent Labs   Lab Test 04/16/18  1230   COLOR Yellow   APPEARANCE Clear   URINEGLC Negative   URINEBILI Negative   URINEKETONE Negative   SG 1.017   UBLD Negative   URINEPH 6.0   PROTEIN Negative   NITRITE Negative   LEUKEST Negative   RBCU 1   WBCU <1      Component PSA   Latest Ref Rng & Units 0 - 4 ug/L   6/25/2019 0.35   2/1/2021 0.41     Lab Results   Component Value Date    CR 0.90 02/01/2021             Impression and Plan:   Impression:   55-year-old man with history of LUTS and erectile dysfunction      Plan:   LUTS  -We discussed the pathophysiology of the bladder and the prostate and the normal changes associated with  development of BPH and LUTS  -His most bothersome symptom is nocturia which has improved with tamsulosin 0.4 mg daily  -I recommend he continue on this medication  -We discussed the benefits of daily Cialis 5 mg on further improvement of his LUTS    Erectile dysfunction  -He had previously been on daily 5 mg Cialis with good results  -Was changed to sildenafil 50 mg as needed in 2018 and has not had good results with this medication  -We discussed that given his concomitant erectile dysfunction and LUTS, I would recommend return to daily Cialis 5 mg and a new prescription sent to the pharmacy.  I discussed that if a prior authorization is required we would assist in any documentation needed as this medication has been shown to improve both of these symptoms when present  -Plan for follow-up in 1 year for symptom check     Thank you for the kind consultation.    Time spent: 20 minutes spent on the date of the encounter doing chart review, history and exam, documentation and further activities as noted above.    Rony Gillette MD   Urology  [facility]  [facility] Phone: [phone]  [facility] Phone: [phone]       Parrish Muir  who is being evaluated via a billable video visit.      How would you like to obtain your AVS? MyChart  If the video visit is dropped, the invitation should be resent by: Text to cell phone: [phone]  Will anyone else be joining your video visit? No    Video-Visit Details    Type of service:  Video Visit    Video Start Time: 9:37 AM    Video End Time:9:55 AM    Originating Location (pt. Location): Home    Distant Location (provider location):  [facility]     Platform used for Video Visit: eDabba

## 2021-03-12 ENCOUNTER — TELEPHONE (OUTPATIENT)
Dept: UROLOGY | Facility: CLINIC | Age: 56
End: 2021-03-12

## 2021-03-12 NOTE — TELEPHONE ENCOUNTER
Prior Authorization Retail Medication Request    Medication/Dose: tadalafil (CIALIS) 5 MG tablet  ICD code (if different than what is on RX):    Previously Tried and Failed:   Rationale:      Insurance Name:    Insurance ID:        Pharmacy Information (if different than what is on RX)  Name:    Phone:      Lynnette Lozano RN, BSN

## 2021-03-15 NOTE — TELEPHONE ENCOUNTER
Central Prior Authorization Team   [phone]    PA Initiation    Medication: tadalafil (CIALIS) 5 MG tablet  Insurance Company: [employer] - Phone [phone] Fax [phone]  Pharmacy Filling the Rx: [facility] - [place] - [address]  Filling Pharmacy Phone: [phone]  Filling Pharmacy Fax: [phone]  Start Date: 3/15/2021

## 2021-03-15 NOTE — TELEPHONE ENCOUNTER
Prior Authorization Approval    Authorization Effective Date: 2/15/2021  Authorization Expiration Date: 3/15/2022  Medication: tadalafil (CIALIS) 5 MG tablet-PA APPROVED   Approved Dose/Quantity:   Reference #:     Insurance Company: [employer] - Phone [phone] Fax [phone]  Expected CoPay:       CoPay Card Available:      Foundation Assistance Needed:    Which Pharmacy is filling the prescription (Not needed for infusion/clinic administered): [facility] - [place] - [address]  Pharmacy Notified: Yes- **Instructed pharmacy to notify patient when script is ready to /ship.**  Patient Notified: Yes

## 2021-03-30 DIAGNOSIS — I10 BENIGN ESSENTIAL HYPERTENSION: ICD-10-CM

## 2021-03-31 RX ORDER — LOSARTAN POTASSIUM 100 MG/1
100 TABLET ORAL DAILY
Qty: 90 TABLET | Refills: 0 | Status: SHIPPED | OUTPATIENT
Start: 2021-03-31 | End: 2021-07-09

## 2021-03-31 NOTE — TELEPHONE ENCOUNTER
losartan (COZAAR) 100 MG tablet      Last Written Prescription Date:  3/16/20  Last Fill Quantity: 90,   # refills: 3  Last Office Visit : 2/1/21  Future Office visit:  None scheduled    Routing refill request to provider for review/approval because:  Blood pressure out of range   BP Readings from Last 3 Encounters:   02/01/21 (!) 143/99   08/15/19 134/83   08/12/19 116/78     90 day refill per protocol, routed to clinic for follow up  Not mentioned in dictation note

## 2021-05-10 DIAGNOSIS — G43.009 MIGRAINE WITHOUT AURA AND WITHOUT STATUS MIGRAINOSUS, NOT INTRACTABLE: ICD-10-CM

## 2021-05-12 RX ORDER — ACETAMINOPHEN 500 MG
500-1000 TABLET ORAL EVERY 6 HOURS PRN
Qty: 120 TABLET | Refills: 3 | Status: SHIPPED | OUTPATIENT
Start: 2021-05-12 | End: 2021-12-08

## 2021-05-12 NOTE — TELEPHONE ENCOUNTER
ACETAMIN EX STR 500MG TB  Last Written Prescription Date:  2/1/2021  Last Fill Quantity: 120,   # refills: 0  Last Office Visit : 2/1/2021  Future Office visit:  None  120 Tabs, 3 Refill sent to pharm 5/12/2021    Shaila Chavez RN  Central Triage Red Flags/Med Refills    Warnings Override History for acetaminophen (ACETAMINOPHEN EXTRA STRENGTH) 500 MG tablet [[id number]]    Overridden by Seb Lafleur MD on Feb 1, 2021 2:32 PM   Allergy/Contraindication   1. HYDROCODONE-ACETAMINOPHEN [Level: Ingredient Match] [Reason: Benefit outweighs risk]

## 2021-07-07 DIAGNOSIS — I10 BENIGN ESSENTIAL HYPERTENSION: ICD-10-CM

## 2021-07-09 NOTE — TELEPHONE ENCOUNTER
LOSARTAN 100MG TABLETS   Last Written Prescription Date:  3/31/2021  Last Fill Quantity: 90,   # refills: 0  Last Office Visit : 2/1/2021  Future Office visit:  None  Second Request       Abnormal B/P      Change med?  Change dose?  Add med?   Follow up B/P check?  Refer to clinic for review       Shaila Chavez RN  Central Triage Red Flags/Med Refills

## 2021-07-12 RX ORDER — LOSARTAN POTASSIUM 100 MG/1
100 TABLET ORAL DAILY
Qty: 90 TABLET | Refills: 1 | Status: SHIPPED | OUTPATIENT
Start: 2021-07-12 | End: 2022-01-05

## 2021-10-23 ENCOUNTER — HEALTH MAINTENANCE LETTER (OUTPATIENT)
Age: 56
End: 2021-10-23

## 2021-12-06 DIAGNOSIS — G43.009 MIGRAINE WITHOUT AURA AND WITHOUT STATUS MIGRAINOSUS, NOT INTRACTABLE: ICD-10-CM

## 2021-12-08 RX ORDER — ACETAMINOPHEN 500 MG
500-1000 TABLET ORAL EVERY 6 HOURS PRN
Qty: 120 TABLET | Refills: 2 | Status: SHIPPED | OUTPATIENT
Start: 2021-12-08 | End: 2022-06-06

## 2021-12-30 DIAGNOSIS — R35.0 BENIGN PROSTATIC HYPERPLASIA WITH URINARY FREQUENCY: ICD-10-CM

## 2021-12-30 DIAGNOSIS — N40.1 BENIGN PROSTATIC HYPERPLASIA WITH URINARY FREQUENCY: ICD-10-CM

## 2022-01-02 DIAGNOSIS — I10 BENIGN ESSENTIAL HYPERTENSION: ICD-10-CM

## 2022-01-04 RX ORDER — TAMSULOSIN HYDROCHLORIDE 0.4 MG/1
0.4 CAPSULE ORAL DAILY
Qty: 90 CAPSULE | Refills: 0 | Status: SHIPPED | OUTPATIENT
Start: 2022-01-04 | End: 2022-04-29

## 2022-01-04 NOTE — TELEPHONE ENCOUNTER
TAMSULOSIN HCL 0.4MG CAPS  Last Written Prescription Date:  2/1/2021  Last Fill Quantity: 30,   # refills: 3  Last Office Visit : 2/1/2021  Future Office visit:  None  Routing refill request to provider for review/approval because:  Gaps in Refills      Only 120 days sent to pharm last order??  Last filled Feb 2021  Refer to clinic for review and refills per Providers orders.       Shaila Chavez RN  Central Triage Red Flags/Med Refills

## 2022-01-04 NOTE — MR AVS SNAPSHOT
After Visit Summary   6/30/2017    Parrish Muir    MRN: [id number]           Patient Information     Date Of Birth          1965        Visit Information        Provider Department      6/30/2017 3:00 PM Maria Isabel Perez APRN [facility]        Today's Diagnoses     Chronic low back pain, unspecified back pain laterality, with sciatica presence unspecified    -  1    Chronic neck pain        Chronic pain of left knee        Major depressive disorder, single episode, moderate (H)        Ulcerative pancolitis without complication (H)          Care Instructions    Primary Care Center Phone Number [phone]  Primary Care Center Medication Refill Request Information:  * Please contact your pharmacy regarding ANY request for medication refills.  ** [facility] Prescription Fax = [phone]  * Please allow 3 business days for routine medication refills.  * Please allow 5 business days for controlled substance medication refills.     Primary Care Center Test Result notification information:  *You will be notified with in 7-10 days of your appointment day regarding the results of your test.  If you are on MyChart you will be notified as soon as the provider has reviewed the results and signed off on them.                  Follow-ups after your visit        Additional Services     PAIN MANAGEMENT CENTER ([place]) REFERRAL       Your provider has referred you to the [facility].    Reason for Referral: Comprehensive Evaluation and Management    Please complete the following questions:    What is your diagnosis for the patient's pain? Chronic low back pain, neck pain, knee pain    Do you have any specific questions for the pain specialist? No    Are there any red flags that may impact the assessment or management of the patient? Patient has already been evaluated/treated at a pain clinic: Where: [facility]  When: May 17th, 2017    **ANY DIAGNOSTIC  TESTS THAT ARE NOT IN EPIC SHOULD BE SENT TO THE PAIN CENTER**    Please note the Pre-Op Pain Consult must be scheduled 2-3 weeks prior to the patient's surgery.  Patient's trying to schedule within 2 weeks of surgery may not be accommodated.     Pre-Op Pain Consults are only good for 30 days.    REGARDING OPIOID MEDICATIONS:  We will always address appropriateness of opioid pain medications, but we generally will not automatically take on a prescribing role. When we do take on prescribing of opioids for chronic pain, it is in collaboration with the referring physician for an intermediate period of time (months), with an expectation that the primary physician or provider will assume the prescribing role if medications are effective at stable doses with demonstrated compliance.  Therefore, please do not assume that your prescribing responsibilities end on the day of pain clinic consultation.  Is this agreeable to you? YES    For any questions, contact the [facility] at [phone].    Please be aware that coverage of these services is subject to the terms and limitations of your health insurance plan.  Call member services at your health plan with any benefit or coverage questions.      Please bring the following with you to your appointment:    (1) Any X-Rays, CTs or MRIs which have been performed.  Contact the facility where they were done to arrange for  prior to your scheduled appointment.    (2) List of current medications   (3) This referral request   (4) Any documents/labs given to you for this referral                  Your next 10 appointments already scheduled     Jun 30, 2017  4:00 PM CDT   LAB with  LAB    Health Lab ([facility])    [address] [address] [phone]           Patient must bring picture ID.  Patient should be prepared to give a urine specimen  Please do not eat 10-12 hours before your  appointment if you are coming in fasting for labs on lipids, cholesterol, or glucose (sugar).  Pregnant women should follow their Care Team instructions. Water with medications is okay. Do not drink coffee or other fluids.   If you have concerns about taking  your medications, please ask at office or if scheduling via Kybalion, send a message by clicking on Secure Messaging, Message Your Care Team.            Aug 14, 2017  8:00 AM CDT   (Arrive by 7:45 AM)   RETURN IRRITABLE BOWEL DISEASE with Francisco Grigsby MD   [facility] ([facility])    [address] [place] [id number] [phone]            Oct 31, 2017 12:30 PM CDT   (Arrive by 12:15 PM)   New Patient Visit with Damien Fay MD   [facility] ([facility])    [address] [place] [id number] [phone]              Future tests that were ordered for you today     Open Future Orders        Priority Expected Expires Ordered    Comprehensive metabolic panel Routine 6/30/2017 7/14/2017 6/30/2017    CBC with platelets Routine 6/30/2017 7/14/2017 6/30/2017            Who to contact     Please call your clinic at [phone] to:    Ask questions about your health    Make or cancel appointments    Discuss your medicines    Learn about your test results    Speak to your doctor   If you have compliments or concerns about an experience at your clinic, or if you wish to file a complaint, please contact [facility] Patient Relations at [phone] or email us at [email or web address]         Additional Information About Your Visit        Yo-[employer] Wellness[employer] Information     Kybalion gives you secure access to your electronic health record. If you see a primary care provider, you can also send messages to your care team and make appointments. If you have questions, please call your  primary care clinic.  If you do not have a primary care provider, please call [phone] and they will assist you.      PulpWorks is an electronic gateway that provides easy, online access to your medical records. With PulpWorks, you can request a clinic appointment, read your test results, renew a prescription or communicate with your care team.     To access your existing account, please contact your [facility] or call [phone] for assistance.        Care EveryWhere ID     This is your Care EveryWhere ID. This could be used by other organizations to access your [facility] medical records  [id number]        Your Vitals Were     Pulse Respirations                70 20           Blood Pressure from Last 3 Encounters:   06/30/17 (!) 151/101   01/29/16 (!) 135/94   11/06/15 122/82    Weight from Last 3 Encounters:   01/29/16 96.1 kg (211 lb 12.8 oz)   11/06/15 95 kg (209 lb 7 oz)   10/23/15 97.2 kg (214 lb 3.2 oz)              We Performed the Following     PAIN MANAGEMENT CENTER ([place]) REFERRAL          Today's Medication Changes          These changes are accurate as of: 6/30/17  3:55 PM.  If you have any questions, ask your nurse or doctor.               These medicines have changed or have updated prescriptions.        Dose/Directions    baclofen 10 MG tablet   Commonly known as:  LIORESAL   This may have changed:  when to take this   Used for:  Chronic low back pain, unspecified back pain laterality, with sciatica presence unspecified   Changed by:  Maria Isabel Perez APRN CNP        Dose:  10 mg   Take 1 tablet (10 mg) by mouth 2 times daily   Quantity:  90 tablet   Refills:  1       gabapentin 300 MG capsule   Commonly known as:  NEURONTIN   This may have changed:  medication strength   Used for:  Chronic low back pain, unspecified back pain laterality, with sciatica presence unspecified   Changed by:  Maria Isabel Perez APRN CNP        Dose:  300 mg   Take 1 capsule  (300 mg) by mouth At Bedtime   Quantity:  30 capsule   Refills:  1            Where to get your medicines      These medications were sent to [id number] - [place], [place] - [address] [place] [address]  [address] [place] [address]     Phone:  [phone]     baclofen 10 MG tablet    escitalopram 20 MG tablet    gabapentin 300 MG capsule    sulfaSALAzine 500 MG tablet                Primary Care Provider Office Phone # Fax #    Farida Dial [phone]       PRIMARY CARE CENTER [address] [place] [address]        Equal Access to Services     [facility]: Hadii aad ku hadasho Soomaali, waaxda luqadaha, qaybta kaalmada adeleeanneyajose maria, tegan pérez . So [facility] [phone].    ATENCIÓN: Si habla español, tiene a boyd disposición servicios gratuitos de asistencia lingüística. [facility] [phone].    We comply with applicable federal civil rights laws and [place] laws. We do not discriminate on the basis of race, color, national origin, age, disability sex, sexual orientation or gender identity.            Thank you!     Thank you for choosing [facility]IMARY [facility]  for your care. Our goal is always to provide you with excellent care. Hearing back from our patients is one way we can continue to improve our services. Please take a few minutes to complete the written survey that you may receive in the mail after your visit with us. Thank you!             Your Updated Medication List - Protect others around you: Learn how to safely use, store and throw away your medicines at www.disposemymeds.org.          This list is accurate as of: 6/30/17  3:55 PM.  Always use your most recent med list.                   Brand Name Dispense Instructions for use Diagnosis    baclofen 10 MG tablet    LIORESAL    90 tablet    Take 1 tablet (10 mg) by mouth 2 times daily    Chronic low back pain, unspecified back pain laterality, with sciatica presence  "unspecified       escitalopram 20 MG tablet    LEXAPRO    90 tablet    Take 1 tablet (20 mg) by mouth daily    Major depressive disorder, single episode, moderate (H)       FOLIC ACID PO      Take 400 mcg by mouth daily        gabapentin 300 MG capsule    NEURONTIN    30 capsule    Take 1 capsule (300 mg) by mouth At Bedtime    Chronic low back pain, unspecified back pain laterality, with sciatica presence unspecified       magnesium citrate solution     296 mL    Take 296 mLs by mouth See Admin Instructions Refer to \"Getting Ready for a Colonoscopy\" patient handout.    Inflammatory bowel disease (ulcerative colitis) (H)       oxyCODONE-acetaminophen 5-325 MG per tablet    PERCOCET     Take 1 tablet by mouth every 4 hours as needed.        sulfaSALAzine 500 MG tablet    AZULFIDINE    180 tablet    Take 2 tablets (1,000 mg) by mouth 2 times daily    Ulcerative pancolitis without complication (H)       TYLENOL PO      Take 1 tablet by mouth every 4 hours as needed for mild pain or fever '\" I take 2500 to 3500 mg of tylenol daily.\"          " 46

## 2022-01-05 RX ORDER — LOSARTAN POTASSIUM 100 MG/1
100 TABLET ORAL DAILY
Qty: 90 TABLET | Refills: 0 | Status: SHIPPED | OUTPATIENT
Start: 2022-01-05 | End: 2022-03-29

## 2022-01-05 NOTE — TELEPHONE ENCOUNTER
LCV: 2/1/2021  [facility]  BP above protocol, noted in lasts clinic visit  RF 90 day

## 2022-02-12 ENCOUNTER — HEALTH MAINTENANCE LETTER (OUTPATIENT)
Age: 57
End: 2022-02-12

## 2022-03-26 DIAGNOSIS — I10 BENIGN ESSENTIAL HYPERTENSION: ICD-10-CM

## 2022-03-29 NOTE — TELEPHONE ENCOUNTER
LOSARTAN 100MG TABLETS      Last Written Prescription Date:  1/5/22  Last Fill Quantity: 90,   # refills: 0  Last Office Visit : 2/1/21  Future Office visit:  None scheduled    Routing refill request to provider for review/approval because:  Received 90 day toshia refill with last fill  Overdue for labs  Lab Test 02/01/21  1454   CR 0.90     Lab Test 02/01/21  1454   POTASSIUM 4.0   Nothing more recent in care everywhere nor ashley  No future orders in queue    Last blood pressure out of range, now outdated  BP Readings from Last 3 Encounters:   02/01/21 (!) 143/99   08/15/19 134/83   08/12/19 116/78

## 2022-03-30 RX ORDER — LOSARTAN POTASSIUM 100 MG/1
100 TABLET ORAL DAILY
Qty: 30 TABLET | Refills: 0 | Status: SHIPPED | OUTPATIENT
Start: 2022-03-30 | End: 2022-05-09

## 2022-04-26 DIAGNOSIS — N40.1 BENIGN PROSTATIC HYPERPLASIA WITH URINARY FREQUENCY: ICD-10-CM

## 2022-04-26 DIAGNOSIS — R35.0 BENIGN PROSTATIC HYPERPLASIA WITH URINARY FREQUENCY: ICD-10-CM

## 2022-04-29 NOTE — TELEPHONE ENCOUNTER
tamsulosin (FLOMAX) 0.4 MG capsule     Take 1 capsule (0.4 mg) by mouth daily   Last Written Prescription Date:  1/4/22  Last Fill Quantity: 90,   # refills: 0  Last Office Visit : 2/1/21  Future Office visit:  5/9/22    Routing refill request to provider for review/approval because:  Failed protocol. tadalafil (CIALIS) 5 MG tablet on med list.  Overdue BP. Appointment 5/9/22.  Please advise.     Overdue visit and  BP.     02/01/21 (!) 143/99

## 2022-05-02 RX ORDER — TAMSULOSIN HYDROCHLORIDE 0.4 MG/1
0.4 CAPSULE ORAL DAILY
Qty: 90 CAPSULE | Refills: 0 | Status: SHIPPED | OUTPATIENT
Start: 2022-05-02 | End: 2023-12-04

## 2022-05-09 ENCOUNTER — VIRTUAL VISIT (OUTPATIENT)
Dept: INTERNAL MEDICINE | Facility: CLINIC | Age: 57
End: 2022-05-09
Payer: COMMERCIAL

## 2022-05-09 DIAGNOSIS — G89.4 CHRONIC PAIN SYNDROME: ICD-10-CM

## 2022-05-09 DIAGNOSIS — Z86.0100 HISTORY OF COLONIC POLYPS: ICD-10-CM

## 2022-05-09 DIAGNOSIS — I10 BENIGN ESSENTIAL HYPERTENSION: Primary | ICD-10-CM

## 2022-05-09 PROCEDURE — 99214 OFFICE O/P EST MOD 30 MIN: CPT | Mod: GT | Performed by: PEDIATRICS

## 2022-05-09 RX ORDER — LOSARTAN POTASSIUM 100 MG/1
100 TABLET ORAL DAILY
Qty: 90 TABLET | Refills: 4 | Status: SHIPPED | OUTPATIENT
Start: 2022-05-09 | End: 2023-08-21

## 2022-05-09 NOTE — PROGRESS NOTES
Parrish is a 57 year old who is being evaluated via a billable video visit.      Pt refused to answer PHQ9 questions    Pt is in [place]    How would you like to obtain your AVS? Torinhart  If the video visit is dropped, the invitation should be resent by: Text to cell phone: [phone]  Will anyone else be joining your video visit? Alejandra Gibbs VF

## 2022-05-09 NOTE — PROGRESS NOTES
Parrish is a 57 year old who is being evaluated via a billable video visit.      How would you like to obtain your AVS? MyChart  If the video visit is dropped, the invitation should be resent by: Text to cell phone: [phone]  Will anyone else be joining your video visit? No    Video Start Time: 11:16 AM    ASSESSMENT: Parrish Muir is a 57 year old male who presents with follow up HTN, chronic pain, UC  (I10) Benign essential hypertension  (primary encounter diagnosis)  Comment: patient due for annual lab surveillance of renal function, electrolytes; will see if we can order through [facility]; BP well controlled per patient, will refill  Plan: losartan (COZAAR) 100 MG tablet        He will check his blood pressure weekly and send me monthly.     (G89.4) Chronic pain syndrome  Comment: Followed by [facility]  Plan: Discussed asking his chronic pain clinic  about Methadone as an option; we also discussed medicinal marijuana which he cannot afford.  I wrote letter requesting an appliance to help with dishwashing instead of a PCA.      (Z86.010) History of colonic polyps  Comment: Stressed need for surveillance colonoscopy, he will call  to arrange  Plan: Reassured patient about low risk of getting COVID testing,  He remains unvaccinated.          Subjective   Parrish is a 57 year old who presents for the following health issues; chronic pain management.  He has had long standing issues of chronic neck, low back and MSK pain for many years related to injuries, DJD.  He has been managed previously by [facility] at their pain clinic, but closed several years ago; now currenltly managed by [facility] in [place], [place] by Dr. Montoya who prescribed Oxycontin and Oxycodone, has been on meds since 2010.  He has had some trouble getting his Oxycontin refilled and has been in pain.  He has not tried Methadone- as he is concerned about side effects.  He is not on medical  "marijuana because of the cost.  He is on disability from [employer].      He also has an underlying diagnosis of ulcerative colitis.    He comes in for follow up of his hypertension.  He Losartan dose 100 mg daily.  His blood pressures have been running 120's/ 70-80's.  He is able to check his blood pressure every 2-3 days.  He does not have any side effects with the Losartan- no cough, dizziness, shortness of breath, nausea or vomiting.   He has not had any blood work in the past year.  He is due for his surveillance colonoscopy but he has not gotten this because of his concerns about getting a COVID  nasal swab (concern about carcinogens in the swab).    He asks whether [facility] will accept a saliva.  He has a history of polyps.       He had COVID December 2021- he was sick for the first two days, followed by headaches.  He continues to have some residual headaches, has pain in both of his shins since his infection.       He continues to live in public housing.  He does not have a  and would like one to help so he doesn't have to stand for prolonged periods.     HPI     As above.  He remains unvaccinated.  He continues to have headaches, temporal areas/ frontal      Past Medical History:   Diagnosis Date     Chronic pain syndrome     Followed by Gokul Negrete MD at a Pain Clinic in [place].     Depression      Episodic tension-type headache, not intractable      Hypertension      Ulcerative colitis (H) 2003        Review of Systems   Constitutional, HEENT, cardiovascular, pulmonary, gi and gu systems are negative, except as otherwise noted.      Objective    Vitals - Patient Reported  Systolic (Patient Reported): 124  Diastolic (Patient Reported): 89  Weight (Patient Reported): 102.1 kg (225 lb)  Height (Patient Reported): 175.3 cm (5' 9\")  BMI (Based on Pt Reported Ht/Wt): 33.23  Pulse (Patient Reported): 74  Pain Score: Severe Pain (6)  Pain Loc: Low Back        Physical Exam   GENERAL: Healthy, alert and no " distress  EYES: Eyes grossly normal to inspection.  No discharge or erythema, or obvious scleral/conjunctival abnormalities.  RESP: No audible wheeze, cough, or visible cyanosis.  No visible retractions or increased work of breathing.    SKIN: Visible skin clear. No significant rash, abnormal pigmentation or lesions.  NEURO: Cranial nerves grossly intact.  Mentation and speech appropriate for age.  PSYCH: Mentation appears normal, affect normal/bright, judgement and insight intact, normal speech and appearance well-groomed.                Video-Visit Details    Type of service:  Video Visit    Video End Time:11:46 AM    Originating Location (pt. Location): Home    Distant Location (provider location):  [facility] INTERNAL MEDICINE [place]     Platform used for Video Visit: [employer]

## 2022-05-09 NOTE — LETTER
Parrish Muir  [address] [place] [address]  : 1965  MRN:  [id number]      May 9, 2022          To Whom it May Concern:    Parrish Muir is under my care.  He has issues with chronic back pain that limit him significantly.  He would benefit from any support that would minimize prolonged standing such as having a , in place of a PCA, to do his dishes.     Please consider this to support his medical condition.     Please call for any questions.         Seb Lafleur MD

## 2022-05-09 NOTE — Clinical Note
Pt lives [place].  Can we order a basic metabolic panel through [facility] as part of his surveillance for HTN?  Thanks

## 2022-05-09 NOTE — PATIENT INSTRUCTIONS
Thank you for visiting the Primary Care Center today at the [facility]! The following is some information about our clinic:     Primary Care Center Frequently-Asked Questions    (1) How do I schedule appointments at the [facility]?     Primary Care--to schedule or make changes to an existing appointment, please call our primary care line at [phone].    Labs--to schedule a lab appointment at the [facility] you can use [employer] or call [phone]. If you have a [facility] location that is closer to home, you can reach out to that location for scheduling options.     Imaging--if you need to schedule a CT, X-ray, MRI, ultrasound, or other imaging study you can call [phone] to schedule at the [facility] or any other [facility] imaging location.     Referrals--if a referral to another specialty was ordered you can expect a phone call from their scheduling team. If you have not heard from them in a week, please call us or send us a Homejoy message to check the status or get a scheduling number. Please note that this only applies to internal [facility] referrals. If the referral is external you would need to contact their office for scheduling.     (2) I have a question about my visit, who do I contact?     You can call us at the primary care line at [phone] to ask questions about your visit. You can also send a secure message through Homejoy, which is reviewed by clinic staff. Please note that Homejoy messages have a twenty-four to forty-eight business hour turnaround time and should not be used for urgent concerns.    (3) How will I get the results of my tests?    If you are signed up for [employer] all tests will be released to you within twenty-four hours of resulting. Please allow three to five days for your doctor to review your results and place a note interpreting the results. If you do not have ConsiderChart you will receive your  results through mail seven to ten business days following the return of the tests. Please note that if there should be any urgent or concerning results that your doctor or their registered nurse will reach out to you the same day as the tests come back. If you have follow up questions about your results or would like to discuss the results in detail please schedule a follow up with your provider either in person or virtually.     (4) How do I get refills of my prescriptions?     You should always first contact your pharmacy for refills of your medications. If submitting a refill request on ElementsLocal, please be sure to submit the request only once--repeat requests can cause delays in refill. If you are requesting a NEW medication or a medication related to new symptoms you will need to schedule an appointment with a provider prior to approval. Please note: Routine medication refills have up to one to three business day turnaround whereas controlled substances refills have up to five to seven business day turnaround.    (5) I have new symptoms, what do I do?     If you are having an immediate medical emergency, you should dial 911 for assistance.   For anything urgent that needs to be seen within a few hours to one day you should visit a local urgent care for assistance.  For non-urgent symptoms that need to be seen within a few days to a week you can schedule with an available provider in primary care by going to [employer] or calling [phone].   If you are not sure how serious your symptoms are or you would like to receive medical advice you can always call [phone] to speak with a triage nurse.

## 2022-05-10 ENCOUNTER — TELEPHONE (OUTPATIENT)
Dept: INTERNAL MEDICINE | Facility: CLINIC | Age: 57
End: 2022-05-10
Payer: COMMERCIAL

## 2022-05-10 DIAGNOSIS — I10 HTN (HYPERTENSION): Primary | ICD-10-CM

## 2022-05-10 NOTE — TELEPHONE ENCOUNTER
[facility] Call Center    Phone Message    May a detailed message be left on voicemail: yes     Reason for Call: Other: Patient calling to provide [facility] contact [facility].     Clinic Fax#: [phone]  Clinic PH#: [phone]    Action Taken: Message routed to:  [facility] ([facility]): PCC    Travel Screening: Not Applicable

## 2022-05-10 NOTE — TELEPHONE ENCOUNTER
BMP lab order faxed.      Bethel Casey CMA ([facility]) at 11:34 AM on 5/10/2022

## 2022-05-10 NOTE — TELEPHONE ENCOUNTER
PCP ordered BMP and pt needs to have the lab done at [facility] [place].  Pt will call back with the fax number, then I will fax the lab order to them.    Call center : please get the information. Thank you.    Rodolfo

## 2022-05-31 DIAGNOSIS — G43.009 MIGRAINE WITHOUT AURA AND WITHOUT STATUS MIGRAINOSUS, NOT INTRACTABLE: ICD-10-CM

## 2022-06-06 RX ORDER — ACETAMINOPHEN 500 MG
500-1000 TABLET ORAL EVERY 6 HOURS PRN
Qty: 120 TABLET | Refills: 2 | Status: SHIPPED | OUTPATIENT
Start: 2022-06-06 | End: 2022-12-14

## 2022-06-06 NOTE — TELEPHONE ENCOUNTER
acetaminophen (ACETAMINOPHEN EXTRA STRENGTH) 500 MG tablet    5/9/2022  [facility] Internal Medicine Seb Noonan MD    Internal Medicine    low med alert    Warnings Override History for acetaminophen (ACETAMINOPHEN EXTRA STRENGTH) 500 MG tablet [[id number]]    Overridden by Zenobia Hills RN on Dec 8, 2021 6:49 AM   Allergy/Contraindication   1. HYDROCODONE-ACETAMINOPHEN [Level: Ingredient Match] [Reason: Tolerated medication/side effects in past]

## 2022-07-06 DIAGNOSIS — I10 BENIGN ESSENTIAL HYPERTENSION: ICD-10-CM

## 2022-07-11 RX ORDER — LOSARTAN POTASSIUM 100 MG/1
100 TABLET ORAL DAILY
Qty: 90 TABLET | Refills: 3 | Status: SHIPPED | OUTPATIENT
Start: 2022-07-11 | End: 2023-07-21

## 2022-07-11 NOTE — TELEPHONE ENCOUNTER
LOSARTAN 100MG TABLETS    Resent order to updated/alternative pharmacy for Pt care.       Shaila Chavez RN  Central Triage Red Flags/Med Refills

## 2022-07-28 ENCOUNTER — MYC MEDICAL ADVICE (OUTPATIENT)
Dept: INTERNAL MEDICINE | Facility: CLINIC | Age: 57
End: 2022-07-28

## 2022-10-10 ENCOUNTER — HEALTH MAINTENANCE LETTER (OUTPATIENT)
Age: 57
End: 2022-10-10

## 2022-11-23 ENCOUNTER — DOCUMENTATION ONLY (OUTPATIENT)
Dept: INTERNAL MEDICINE | Facility: CLINIC | Age: 57
End: 2022-11-23

## 2022-11-23 NOTE — PROGRESS NOTES
Type of Form Received: HOUSING FORM    Form Received (Date) 11/23/22   Form Filled out Yes 12/19/22   Placed in provider folder Yes

## 2022-12-13 DIAGNOSIS — G43.009 MIGRAINE WITHOUT AURA AND WITHOUT STATUS MIGRAINOSUS, NOT INTRACTABLE: ICD-10-CM

## 2022-12-14 RX ORDER — ACETAMINOPHEN 500 MG
500-1000 TABLET ORAL EVERY 6 HOURS PRN
Qty: 120 TABLET | Refills: 2 | Status: SHIPPED | OUTPATIENT
Start: 2022-12-14 | End: 2023-05-11

## 2023-01-08 DIAGNOSIS — J31.2 CHRONIC SORE THROAT: ICD-10-CM

## 2023-01-10 RX ORDER — FLUTICASONE PROPIONATE 50 MCG
SPRAY, SUSPENSION (ML) NASAL
Qty: 16 G | Refills: 3 | Status: SHIPPED | OUTPATIENT
Start: 2023-01-10 | End: 2024-02-06

## 2023-02-18 ENCOUNTER — HEALTH MAINTENANCE LETTER (OUTPATIENT)
Age: 58
End: 2023-02-18

## 2023-05-09 DIAGNOSIS — G43.009 MIGRAINE WITHOUT AURA AND WITHOUT STATUS MIGRAINOSUS, NOT INTRACTABLE: ICD-10-CM

## 2023-05-11 RX ORDER — ACETAMINOPHEN 500 MG
TABLET ORAL
Qty: 120 TABLET | Refills: 0 | Status: SHIPPED | OUTPATIENT
Start: 2023-05-11 | End: 2023-07-10

## 2023-05-11 NOTE — TELEPHONE ENCOUNTER
acetaminophen (ACETAMINOPHEN EXTRA STRENGTH) 500 MG tablet      Last Written Prescription Date:  12-  Last Fill Quantity: 120,   # refills: 2  Last Office Visit : 5-9-2022  Future Office visit:  Not on file    Routing refill request to provider for review/approval because:  Analgesics (Non-Narcotic Tylenol and ASA Only) Failed 05/09/2023 11:11 AM   Protocol Details  Recent (12 mo) or future (30 days) visit within the authorizing provider's specialty     Genny refill for overdue visit  Scheduling has been notified to contact the pt for appointment.

## 2023-05-12 ENCOUNTER — TELEPHONE (OUTPATIENT)
Dept: GASTROENTEROLOGY | Facility: CLINIC | Age: 58
End: 2023-05-12

## 2023-05-12 NOTE — TELEPHONE ENCOUNTER
[facility] Call Center    Phone Message    May a detailed message be left on voicemail: yes     Reason for Call: Other: Parrish is calling in looking to schedule a follow-up appointment due to blood in his stool. Pt's last clinic appt was 11/24/20 with Dr. Vee, who has since departed our clinic. Please call back as soon as possible to discuss.     Action Taken: Message routed to:  [facility] GI    Travel Screening: Not Applicable

## 2023-05-18 ENCOUNTER — TELEPHONE (OUTPATIENT)
Dept: GASTROENTEROLOGY | Facility: CLINIC | Age: 58
End: 2023-05-18
Payer: COMMERCIAL

## 2023-05-18 NOTE — TELEPHONE ENCOUNTER
Writer received a message from ROSEANNE Lock to help get Pt scheduled for an appointment with an IBD provider. Pt saw Dr. Vee on 11/24/2020. Pt is now scheduled for an appointment with Dr. Francisco Moses on 5/30/2023 at 1:40pm for an in-person clinic visit.

## 2023-05-22 ENCOUNTER — MYC MEDICAL ADVICE (OUTPATIENT)
Dept: GASTROENTEROLOGY | Facility: CLINIC | Age: 58
End: 2023-05-22
Payer: COMMERCIAL

## 2023-05-22 NOTE — TELEPHONE ENCOUNTER
Called to remind patient of their upcoming appointment with our GI clinic, on Tuesday 5/30/2023 at 1:25PM with Dr. Francisco Moses. This appointment is scheduled as an in-person appt. Please arrive 15 minutes early to check in for your appointment. , if your appointment is virtual (video or telephone) you need to be in [place] for the visit. To reschedule or cancel patient to call [phone].    Maribeth Harrington MA

## 2023-05-29 ASSESSMENT — ENCOUNTER SYMPTOMS
PANIC: 0
ARTHRALGIAS: 1
NERVOUS/ANXIOUS: 1
BLOATING: 1
DECREASED CONCENTRATION: 0
BACK PAIN: 1
MUSCLE CRAMPS: 0
NAUSEA: 0
JAUNDICE: 0
RECTAL PAIN: 1
STIFFNESS: 1
HEARTBURN: 1
INSOMNIA: 0
BOWEL INCONTINENCE: 0
VOMITING: 0
BLOOD IN STOOL: 1
CONSTIPATION: 1
MYALGIAS: 1
DEPRESSION: 1
MUSCLE WEAKNESS: 0
NECK PAIN: 1
JOINT SWELLING: 0
DIARRHEA: 0
ABDOMINAL PAIN: 1

## 2023-05-30 ENCOUNTER — OFFICE VISIT (OUTPATIENT)
Dept: GASTROENTEROLOGY | Facility: CLINIC | Age: 58
End: 2023-05-30
Payer: COMMERCIAL

## 2023-05-30 VITALS
WEIGHT: 232.3 LBS | HEART RATE: 95 BPM | BODY MASS INDEX: 34.41 KG/M2 | SYSTOLIC BLOOD PRESSURE: 162 MMHG | HEIGHT: 69 IN | DIASTOLIC BLOOD PRESSURE: 87 MMHG | OXYGEN SATURATION: 98 %

## 2023-05-30 DIAGNOSIS — K51.00 ULCERATIVE PANCOLITIS WITHOUT COMPLICATION (H): Primary | ICD-10-CM

## 2023-05-30 DIAGNOSIS — K64.8 INTERNAL HEMORRHOIDS: ICD-10-CM

## 2023-05-30 PROCEDURE — 99214 OFFICE O/P EST MOD 30 MIN: CPT | Performed by: INTERNAL MEDICINE

## 2023-05-30 RX ORDER — MESALAMINE 4 G/60ML
4 SUSPENSION RECTAL EVERY EVENING
Qty: 30 KIT | Refills: 11 | Status: SHIPPED | OUTPATIENT
Start: 2023-05-30 | End: 2024-08-09

## 2023-05-30 RX ORDER — MESALAMINE 1.2 G/1
3600 TABLET, DELAYED RELEASE ORAL
Qty: 90 TABLET | Refills: 11 | Status: SHIPPED | OUTPATIENT
Start: 2023-05-30 | End: 2024-07-15

## 2023-05-30 ASSESSMENT — PAIN SCALES - GENERAL: PAINLEVEL: MODERATE PAIN (5)

## 2023-05-30 NOTE — NURSING NOTE
"Chief Complaint   Patient presents with     Follow Up       Vitals:    05/30/23 1329   BP: (!) 162/87   Pulse: 95   SpO2: 98%   Weight: 105.4 kg (232 lb 4.8 oz)   Height: 1.753 m (5' 9\")       Body mass index is 34.3 kg/m .    Thelma Logic    "

## 2023-05-30 NOTE — PROGRESS NOTES
"[facility]     Reason for Visit:    Provider:  Francisco Moses MD    Date: May 30, 2023    1. Most Recent Disease Activity Assessment:      2018 pan colitis, had vomiting biopsies aborted - unclear at this point its been over 5 years      2.   IBD dysplasia surveillance:   1-2 months     Thank you for this consultation.  It was a pleasure to participate in the care of this patient; please contact us with any further questions.  A total of 35 minutes was spent on the date of the encounter, 2023, with this patient in the following care activities: Chart review, patient care and documentation.      This note was created with voice recognition software, and while reviewed for accuracy, typos may remain.         IBD HISTORY  Age at diagnosis:   Extent of endoscopic disease:  Extent of histologic disease:   Prior UC surgeries:  Prior IBD Medications:      HPI:   Currently having rectal bleeding. Complaining about hemorrhoid.  A year ago it burst had a lot of blood.   He can remember having rectal bleeding since    .      Esparza score:   Stool freq: 1 (1-2 stools/day more than normal)  Rectal bleedin (None)  PGA: 1 (Mild)  Endoscopy: Not done    Extra intestinal manifestations of IBD:  No uveitis/episcleritis  No aphthous ulcers   No arthritis   No erythema nodosum/pyoderma gangrenosum.     sIBDQ:  IBDQ Score Date IBDQ - Total Score  (Higher score better)   2023  12:55 PM 50   10/17/2018   8:43 AM 58        Last endoscopic activity:  Last histologic activity:      Pertinent labs / imaging:     ROS:    Constitutional, HEENT, cardiovascular, pulmonary, GI, , musculoskeletal, neuro, skin, endocrine and psych systems are negative, except as otherwise noted.    PHYSICAL EXAMINATION:  Constitutional: aaox3, cooperative, pleasant, not dyspneic/diaphoretic, no acute distress  Vitals reviewed: BP (!) 162/87   Pulse 95   Ht 1.753 m (5' 9\")   Wt 105.4 kg (232 lb 4.8 oz)   SpO2 98%  "  BMI 34.30 kg/m    Wt:   Wt Readings from Last 2 Encounters:   05/30/23 105.4 kg (232 lb 4.8 oz)   02/01/21 99.8 kg (220 lb)      Eyes: Sclera anicteric/injected  Ears/nose/mouth/throat: Normal oropharynx without ulcers or exudate, mucus membranes moist, hearing intact  Neck: supple, thyroid normal size  CV: No edema  Respiratory: Unlabored breathing  Lymph: No axillary, submandibular, supraclavicular or inguinal lymphadenopathy  Abd:   Nondistended, +bs, no hepatosplenomegaly, nontender, no peritoneal signs  Skin: warm, perfused, no jaundice  Psych: Normal affect  MSK: Normal gait    PERTINENT PAST MEDICAL HISTORY:  Past Medical History:   Diagnosis Date     Chronic pain syndrome     Followed by Gokul Negrete MD at a Pain Clinic in [place].     Depression      Episodic tension-type headache, not intractable      Hypertension      Ulcerative colitis (H) 2003       PREVIOUS SURGERIES:  Past Surgical History:   Procedure Laterality Date     COLONOSCOPY WITH CO2 INSUFFLATION N/A 11/6/2015    Procedure: COLONOSCOPY WITH CO2 INSUFFLATION;  Surgeon: Francisco Grigsby MD;  Location:  OR       ALLERGIES:     Allergies   Allergen Reactions     Morphine Sulfate      Severe hallucinations     Hydrocodone-Acetaminophen Itching       PERTINENT MEDICATIONS:    Current Outpatient Medications:      acetaminophen (ACETAMINOPHEN EXTRA STRENGTH) 500 MG tablet, Take 1-2 tablets (500-1,000 mg) by mouth every 6 hours as needed for mild pain - Oral, Disp: 120 tablet, Rfl: 0     baclofen (LIORESAL) 10 MG tablet, , Disp: , Rfl:      baclofen (LIORESAL) 10 MG tablet, Take 1 tablet (10 mg) by mouth 2 times daily, Disp: 90 tablet, Rfl: 1     benzoyl peroxide 10 % topical liquid, Use daily as directed when showering and be careful about bleaching of towels, Disp: 227 g, Rfl: 3     Camphor-Menthol-Methyl Sal 3.1-6-10 % PTCH, Apply 1 patch topically, Disp: , Rfl:      fluticasone (FLONASE) 50 MCG/ACT nasal spray, INSTILL 1 SPRAY INTO EACH NOSTRIL  ONCE DAILY, Disp: 16 g, Rfl: 3     gabapentin (NEURONTIN) 300 MG capsule, Take 1 capsule (300 mg) by mouth At Bedtime, Disp: 30 capsule, Rfl: 1     losartan (COZAAR) 100 MG tablet, Take 1 tablet (100 mg) by mouth daily, Disp: 90 tablet, Rfl: 3     losartan (COZAAR) 100 MG tablet, Take 1 tablet (100 mg) by mouth daily For additional refills, please schedule annual appointment at [phone], Disp: 90 tablet, Rfl: 4     losartan (COZAAR) 100 MG tablet, , Disp: , Rfl:      order for DME, Equipment being ordered: Digital home blood pressure monitor kit, Disp: 1 Units, Rfl: 0     oxyCODONE (OXYCONTIN) 10 MG 12 hr tablet, Take 10 mg by mouth every 12 hours, Disp: , Rfl:      oxyCODONE-acetaminophen (PERCOCET) 5-325 MG per tablet, Take 1 tablet by mouth every 6 hours as needed for moderate to severe pain, Disp: 120 tablet, Rfl: 0     polyethylene glycol (MIRALAX) 17 GM/Dose powder, Take 17 g by mouth 2 times daily For a goal of 1-2 soft bowel movements daily. Take an additional 17g if you are not achieving this., Disp: 510 g, Rfl: 3     tamsulosin (FLOMAX) 0.4 MG capsule, Take 1 capsule (0.4 mg) by mouth daily, Disp: 90 capsule, Rfl: 0     balsalazide (COLAZAL) 750 MG capsule, Take 3 capsules (2,250 mg) by mouth 3 times daily For additional refills, please schedule a follow-up appointment. (Patient not taking: Reported on 5/30/2023), Disp: 270 capsule, Rfl: 1     cholecalciferol (VITAMIN D3) 5000 units (125 mcg) capsule, TAKE ONE CAPSULE BY MOUTH ONCE DAILY, Disp: 30 capsule, Rfl: 3     FOLIC ACID PO, Take 400 mcg by mouth daily, Disp: , Rfl:      NARCAN nasal spray, , Disp: , Rfl:      sildenafil (VIAGRA) 50 MG tablet, Take 1 tablet (50 mg) by mouth daily as needed (erectile dysfunction) (Patient not taking: Reported on 5/9/2022), Disp: 10 tablet, Rfl: 1     tadalafil (CIALIS) 5 MG tablet, , Disp: , Rfl:     Current Facility-Administered Medications:      lidocaine 1% with EPINEPHrine 1:100,000 injection 3 mL, 3 mL,  Intradermal, Once, Julianne Roberts MD    SOCIAL HISTORY:  Social History     Socioeconomic History     Marital status:      Spouse name: Not on file     Number of children: Not on file     Years of education: Not on file     Highest education level: Not on file   Occupational History     Not on file   Tobacco Use     Smoking status: Never     Smokeless tobacco: Never   Vaping Use     Vaping status: Not on file   Substance and Sexual Activity     Alcohol use: No     Drug use: No     Sexual activity: Not on file   Other Topics Concern     Parent/sibling w/ CABG, MI or angioplasty before 65F 55M? Not Asked   Social History Narrative    Lives in [place], 4 children.  Lives with daughter.  On disability.   vetran.  Currently not in a relationship     Social Determinants of Health     Financial Resource Strain: Not on file   Food Insecurity: Not on file   Transportation Needs: Not on file   Physical Activity: Not on file   Stress: Not on file   Social Connections: Not on file   Intimate Partner Violence: Not on file   Housing Stability: Not on file       FAMILY HISTORY:  Family History   Problem Relation Age of Onset     Alzheimer Disease Mother      Hypertension Mother      Cerebrovascular Disease Father      Parkinsonism Father      Hypertension Father      Prostate Cancer Father      Depression Sister      Depression Brother      Depression Sister      Breast Cancer Sister      Depression Sister      Skin Cancer No family hx of      Melanoma No family hx of        Past/family/social history reviewed and no changes    Answers for HPI/ROS submitted by the patient on 5/29/2023  General Symptoms: No  Skin Symptoms: No  HENT Symptoms: No  EYE SYMPTOMS: No  HEART SYMPTOMS: No  LUNG SYMPTOMS: No  INTESTINAL SYMPTOMS: Yes  URINARY SYMPTOMS: No  REPRODUCTIVE SYMPTOMS: No  SKELETAL SYMPTOMS: Yes  BLOOD SYMPTOMS: No  NERVOUS SYSTEM SYMPTOMS: No  MENTAL HEALTH SYMPTOMS: Yes  Heart burn or  indigestion: Yes  Nausea: No  Vomiting: No  Abdominal pain: Yes  Bloating: Yes  Constipation: Yes  Diarrhea: No  Blood in stool: Yes  Black stools: No  Rectal or Anal pain: Yes  Fecal incontinence: No  Yellowing of skin or eyes: No  Vomit with blood: No  Change in stools: No  Back pain: Yes  Muscle aches: Yes  Neck pain: Yes  Swollen joints: No  Joint pain: Yes  Bone pain: No  Muscle cramps: No  Muscle weakness: No  Joint stiffness: Yes  Bone fracture: No  Nervous or Anxious: Yes  Depression: Yes  Trouble sleeping: No  Trouble thinking or concentrating: No  Mood changes: Yes  Panic attacks: No

## 2023-05-30 NOTE — LETTER
2023         RE: Parrish Muir  [address]  [place] [address]        Dear Colleague,    Thank you for referring your patient, Parrish Muir, to the [facility] [place]. Please see a copy of my visit note below.    [facility]     Reason for Visit:    Provider:  Francisco Moses MD    Date: May 30, 2023    Most Recent Disease Activity Assessment:      2018 pan colitis, had vomiting biopsies aborted - unclear at this point its been over 5 years      2.   IBD dysplasia surveillance:   1-2 months     Thank you for this consultation.  It was a pleasure to participate in the care of this patient; please contact us with any further questions.  A total of 35 minutes was spent on the date of the encounter, 2023, with this patient in the following care activities: Chart review, patient care and documentation.      This note was created with voice recognition software, and while reviewed for accuracy, typos may remain.         IBD HISTORY  Age at diagnosis:   Extent of endoscopic disease:  Extent of histologic disease:   Prior UC surgeries:  Prior IBD Medications:      HPI:   Currently having rectal bleeding. Complaining about hemorrhoid.  A year ago it burst had a lot of blood.   He can remember having rectal bleeding since    .      Esparza score:   Stool freq: 1 (1-2 stools/day more than normal)  Rectal bleedin (None)  PGA: 1 (Mild)  Endoscopy: Not done    Extra intestinal manifestations of IBD:  No uveitis/episcleritis  No aphthous ulcers   No arthritis   No erythema nodosum/pyoderma gangrenosum.     sIBDQ:  IBDQ Score Date IBDQ - Total Score  (Higher score better)   2023  12:55 PM 50   10/17/2018   8:43 AM 58        Last endoscopic activity:  Last histologic activity:      Pertinent labs / imaging:     ROS:    Constitutional, HEENT, cardiovascular, pulmonary, GI, , musculoskeletal, neuro, skin, endocrine and psych  "systems are negative, except as otherwise noted.    PHYSICAL EXAMINATION:  Constitutional: aaox3, cooperative, pleasant, not dyspneic/diaphoretic, no acute distress  Vitals reviewed: BP (!) 162/87   Pulse 95   Ht 1.753 m (5' 9\")   Wt 105.4 kg (232 lb 4.8 oz)   SpO2 98%   BMI 34.30 kg/m    Wt:   Wt Readings from Last 2 Encounters:   05/30/23 105.4 kg (232 lb 4.8 oz)   02/01/21 99.8 kg (220 lb)      Eyes: Sclera anicteric/injected  Ears/nose/mouth/throat: Normal oropharynx without ulcers or exudate, mucus membranes moist, hearing intact  Neck: supple, thyroid normal size  CV: No edema  Respiratory: Unlabored breathing  Lymph: No axillary, submandibular, supraclavicular or inguinal lymphadenopathy  Abd:   Nondistended, +bs, no hepatosplenomegaly, nontender, no peritoneal signs  Skin: warm, perfused, no jaundice  Psych: Normal affect  MSK: Normal gait    PERTINENT PAST MEDICAL HISTORY:  Past Medical History:   Diagnosis Date    Chronic pain syndrome     Followed by Gokul Negrete MD at a Pain Clinic in [place].    Depression     Episodic tension-type headache, not intractable     Hypertension     Ulcerative colitis (H) 2003       PREVIOUS SURGERIES:  Past Surgical History:   Procedure Laterality Date    COLONOSCOPY WITH CO2 INSUFFLATION N/A 11/6/2015    Procedure: COLONOSCOPY WITH CO2 INSUFFLATION;  Surgeon: Francisco Grigsby MD;  Location:  OR       ALLERGIES:     Allergies   Allergen Reactions    Morphine Sulfate      Severe hallucinations    Hydrocodone-Acetaminophen Itching       PERTINENT MEDICATIONS:    Current Outpatient Medications:     acetaminophen (ACETAMINOPHEN EXTRA STRENGTH) 500 MG tablet, Take 1-2 tablets (500-1,000 mg) by mouth every 6 hours as needed for mild pain - Oral, Disp: 120 tablet, Rfl: 0    baclofen (LIORESAL) 10 MG tablet, , Disp: , Rfl:     baclofen (LIORESAL) 10 MG tablet, Take 1 tablet (10 mg) by mouth 2 times daily, Disp: 90 tablet, Rfl: 1    benzoyl peroxide 10 % topical liquid, Use " daily as directed when showering and be careful about bleaching of towels, Disp: 227 g, Rfl: 3    Camphor-Menthol-Methyl Sal 3.1-6-10 % PTCH, Apply 1 patch topically, Disp: , Rfl:     fluticasone (FLONASE) 50 MCG/ACT nasal spray, INSTILL 1 SPRAY INTO EACH NOSTRIL ONCE DAILY, Disp: 16 g, Rfl: 3    gabapentin (NEURONTIN) 300 MG capsule, Take 1 capsule (300 mg) by mouth At Bedtime, Disp: 30 capsule, Rfl: 1    losartan (COZAAR) 100 MG tablet, Take 1 tablet (100 mg) by mouth daily, Disp: 90 tablet, Rfl: 3    losartan (COZAAR) 100 MG tablet, Take 1 tablet (100 mg) by mouth daily For additional refills, please schedule annual appointment at [phone], Disp: 90 tablet, Rfl: 4    losartan (COZAAR) 100 MG tablet, , Disp: , Rfl:     order for DME, Equipment being ordered: Digital home blood pressure monitor kit, Disp: 1 Units, Rfl: 0    oxyCODONE (OXYCONTIN) 10 MG 12 hr tablet, Take 10 mg by mouth every 12 hours, Disp: , Rfl:     oxyCODONE-acetaminophen (PERCOCET) 5-325 MG per tablet, Take 1 tablet by mouth every 6 hours as needed for moderate to severe pain, Disp: 120 tablet, Rfl: 0    polyethylene glycol (MIRALAX) 17 GM/Dose powder, Take 17 g by mouth 2 times daily For a goal of 1-2 soft bowel movements daily. Take an additional 17g if you are not achieving this., Disp: 510 g, Rfl: 3    tamsulosin (FLOMAX) 0.4 MG capsule, Take 1 capsule (0.4 mg) by mouth daily, Disp: 90 capsule, Rfl: 0    balsalazide (COLAZAL) 750 MG capsule, Take 3 capsules (2,250 mg) by mouth 3 times daily For additional refills, please schedule a follow-up appointment. (Patient not taking: Reported on 5/30/2023), Disp: 270 capsule, Rfl: 1    cholecalciferol (VITAMIN D3) 5000 units (125 mcg) capsule, TAKE ONE CAPSULE BY MOUTH ONCE DAILY, Disp: 30 capsule, Rfl: 3    FOLIC ACID PO, Take 400 mcg by mouth daily, Disp: , Rfl:     NARCAN nasal spray, , Disp: , Rfl:     sildenafil (VIAGRA) 50 MG tablet, Take 1 tablet (50 mg) by mouth daily as needed (erectile  dysfunction) (Patient not taking: Reported on 5/9/2022), Disp: 10 tablet, Rfl: 1    tadalafil (CIALIS) 5 MG tablet, , Disp: , Rfl:     Current Facility-Administered Medications:     lidocaine 1% with EPINEPHrine 1:100,000 injection 3 mL, 3 mL, Intradermal, Once, Julianne Roberts MD    SOCIAL HISTORY:  Social History     Socioeconomic History    Marital status:      Spouse name: Not on file    Number of children: Not on file    Years of education: Not on file    Highest education level: Not on file   Occupational History    Not on file   Tobacco Use    Smoking status: Never    Smokeless tobacco: Never   Vaping Use    Vaping status: Not on file   Substance and Sexual Activity    Alcohol use: No    Drug use: No    Sexual activity: Not on file   Other Topics Concern    Parent/sibling w/ CABG, MI or angioplasty before 65F 55M? Not Asked   Social History Narrative    Lives in [place], 4 children.  Lives with daughter.  On disability.   vetran.  Currently not in a relationship     Social Determinants of Health     Financial Resource Strain: Not on file   Food Insecurity: Not on file   Transportation Needs: Not on file   Physical Activity: Not on file   Stress: Not on file   Social Connections: Not on file   Intimate Partner Violence: Not on file   Housing Stability: Not on file       FAMILY HISTORY:  Family History   Problem Relation Age of Onset    Alzheimer Disease Mother     Hypertension Mother     Cerebrovascular Disease Father     Parkinsonism Father     Hypertension Father     Prostate Cancer Father     Depression Sister     Depression Brother     Depression Sister     Breast Cancer Sister     Depression Sister     Skin Cancer No family hx of     Melanoma No family hx of        Past/family/social history reviewed and no changes    Answers for HPI/ROS submitted by the patient on 5/29/2023  General Symptoms: No  Skin Symptoms: No  HENT Symptoms: No  EYE SYMPTOMS: No  HEART SYMPTOMS:  No  LUNG SYMPTOMS: No  INTESTINAL SYMPTOMS: Yes  URINARY SYMPTOMS: No  REPRODUCTIVE SYMPTOMS: No  SKELETAL SYMPTOMS: Yes  BLOOD SYMPTOMS: No  NERVOUS SYSTEM SYMPTOMS: No  MENTAL HEALTH SYMPTOMS: Yes  Heart burn or indigestion: Yes  Nausea: No  Vomiting: No  Abdominal pain: Yes  Bloating: Yes  Constipation: Yes  Diarrhea: No  Blood in stool: Yes  Black stools: No  Rectal or Anal pain: Yes  Fecal incontinence: No  Yellowing of skin or eyes: No  Vomit with blood: No  Change in stools: No  Back pain: Yes  Muscle aches: Yes  Neck pain: Yes  Swollen joints: No  Joint pain: Yes  Bone pain: No  Muscle cramps: No  Muscle weakness: No  Joint stiffness: Yes  Bone fracture: No  Nervous or Anxious: Yes  Depression: Yes  Trouble sleeping: No  Trouble thinking or concentrating: No  Mood changes: Yes  Panic attacks: No          Again, thank you for allowing me to participate in the care of your patient.      Sincerely,    Francisco Moses MD

## 2023-06-05 ENCOUNTER — PATIENT OUTREACH (OUTPATIENT)
Dept: GASTROENTEROLOGY | Facility: CLINIC | Age: 58
End: 2023-06-05
Payer: COMMERCIAL

## 2023-06-05 NOTE — TELEPHONE ENCOUNTER
Diagnosis, Referred by & from: Internal Hemorrhoids   Appt date: 8/21/2023   NOTES STATUS DETAILS   OFFICE NOTE from referring provider [facility]:  5/30/23 - GI OV with Dr. Moses   OFFICE NOTE from other specialist [facility]:  5/9/22 - PCC OV with Dr. Lafleur  8/2/19 - PULM OV with Dr. Grigsby   DISCHARGE SUMMARY from hospital N/A    DISCHARGE REPORT from the ER N/A    OPERATIVE REPORT N/A    MEDICATION LIST Internal    LABS     BIOPSIES/PATHOLOGY RELATED TO DIAGNOSIS [facility]:  8/23/18 - Colon Biopsy (Case: [id number])  11/6/15 - Colon Biopsy (Case: [id number])  9/1/15 - Cecum Biopsy (Case: [id number])   DIAGNOSTIC PROCEDURES     COLONOSCOPY Received [facility]:  8/23/18 - Colonoscopy   IMAGING (DISC & REPORT) N/A

## 2023-07-05 DIAGNOSIS — G43.009 MIGRAINE WITHOUT AURA AND WITHOUT STATUS MIGRAINOSUS, NOT INTRACTABLE: ICD-10-CM

## 2023-07-10 RX ORDER — ACETAMINOPHEN 500 MG
TABLET ORAL
Qty: 120 TABLET | Refills: 0 | Status: SHIPPED | OUTPATIENT
Start: 2023-07-10 | End: 2023-09-08

## 2023-07-10 NOTE — TELEPHONE ENCOUNTER
ACETAMIN EX STR 500MG TB      Last Written Prescription Date:  5/11/23  Last Fill Quantity: 120,   # refills: 0  Last Office Visit : 5/9/22  Future Office visit:  7/31/23      Overdue for office visit - has appt scheduled in the next 30 days.     Genny refill sent to cover pt until next appt           Warnings Override History for acetaminophen (ACETAMINOPHEN EXTRA STRENGTH) 500 MG tablet [[id number]]    Overridden by Ginny Camacho RN on May 11, 2023 3:07 PM   Allergy/Contraindication   1. HYDROCODONE-ACETAMINOPHEN [Level: Ingredient Match] [Reason: Tolerated medication/side effects in past]

## 2023-07-12 ENCOUNTER — TELEPHONE (OUTPATIENT)
Dept: GASTROENTEROLOGY | Facility: CLINIC | Age: 58
End: 2023-07-12
Payer: COMMERCIAL

## 2023-07-12 ENCOUNTER — HOSPITAL ENCOUNTER (OUTPATIENT)
Facility: CLINIC | Age: 58
End: 2023-07-12
Attending: INTERNAL MEDICINE | Admitting: INTERNAL MEDICINE
Payer: COMMERCIAL

## 2023-07-12 NOTE — TELEPHONE ENCOUNTER
"Endoscopy Scheduling Screen    Have you had a positive Covid test in the last 14 days?  No    Are you active on MyChart?   Yes    What insurance is in the chart?  Other:      Ordering/Referring Provider: [id number]   (If ordering provider performs procedure, schedule with ordering provider unless otherwise instructed. )    BMI: Estimated body mass index is 34.3 kg/m  as calculated from the following:    Height as of 5/30/23: 1.753 m (5' 9\").    Weight as of 5/30/23: 105.4 kg (232 lb 4.8 oz).     Sedation Ordered  MAC/deep sedation.   BMI<= 45 45 < BMI <= 48 48 < BMI < = 50  BMI > 50   No Restrictions No [facility]  No [facility]  [facility] with exceptions Hospital Only OR Only       Do you have a history of malignant hyperthermia or adverse reaction to anesthesia?  No    (Females) Are you currently pregnant?        Have you been diagnosed or told you have pulmonary hypertension?   No    Do you have an LVAD?  No    Have you been told you have moderate to severe sleep apnea?  No    Have you been told you have COPD, asthma, or any other lung disease?  No    Do you have any heart conditions?  No     Have you ever had or are you awaiting a heart or lung transplant?   No    Have you had a stroke or transient ischemic attack (TIA aka \"mini stroke\" in the last 6 months?   No    Have you been diagnosed with or been told you have cirrhosis of the liver?   No    Are you currently on dialysis?   No    Do you need assistance transferring?   No    BMI: Estimated body mass index is 34.3 kg/m  as calculated from the following:    Height as of 5/30/23: 1.753 m (5' 9\").    Weight as of 5/30/23: 105.4 kg (232 lb 4.8 oz).     Is patients BMI > 40 and scheduling location [facility]?  No    Do you take the medication Phentermine, Ozempic or Wegovy?  No    Do you take the medication Naltrexone?  No    Do you take blood thinners?  No      Prep   Are you currently on dialysis or do you have chronic kidney disease?  No    Do you have a diagnosis of " diabetes?  No    Do you have a diagnosis of cystic fibrosis (CF)?  No    On a regular basis do you go 3 -5 days between bowel movements?  No    BMI > 40?  No    Preferred Pharmacy:        [facility] - [place], [place] - [address]  [address]  [place] [address]  Phone: [phone] Fax: [phone]          Final Scheduling Details   Colonoscopy prep sent?  MiraLAX (No Mag Citrate)    Procedure scheduled  Colonoscopy    Surgeon:  SAUL     Date of procedure:  10/26     Schedule PAC:   No    Location  [facility]    Sedation   MAC/Deep Sedation    Patient Reminders:    You will receive a call from a Nurse to review instructions and health history.  This assessment must be completed prior to your procedure.  Failure to complete the Nurse assessment may result in the procedure being cancelled.       On the day of your procedure, please designate an adult(s) who can drive you home stay with you for the next 24 hours. The medicines used in the exam will make you sleepy. You will not be able to drive.       You cannot take public transportation, ride share services, or non-medical taxi service without a responsible caregiver.  Medical transport services are allowed with the requirement that a responsible caregiver will receive you at your destination.  We require that drivers and caregivers are confirmed prior to your procedure.

## 2023-07-18 DIAGNOSIS — I10 BENIGN ESSENTIAL HYPERTENSION: ICD-10-CM

## 2023-07-21 RX ORDER — LOSARTAN POTASSIUM 100 MG/1
TABLET ORAL
Qty: 90 TABLET | Refills: 0 | Status: SHIPPED | OUTPATIENT
Start: 2023-07-21 | End: 2023-10-18

## 2023-08-21 ENCOUNTER — OFFICE VISIT (OUTPATIENT)
Dept: SURGERY | Facility: CLINIC | Age: 58
End: 2023-08-21
Attending: INTERNAL MEDICINE
Payer: COMMERCIAL

## 2023-08-21 ENCOUNTER — PRE VISIT (OUTPATIENT)
Dept: SURGERY | Facility: CLINIC | Age: 58
End: 2023-08-21

## 2023-08-21 VITALS — HEART RATE: 83 BPM | SYSTOLIC BLOOD PRESSURE: 148 MMHG | OXYGEN SATURATION: 95 % | DIASTOLIC BLOOD PRESSURE: 90 MMHG

## 2023-08-21 DIAGNOSIS — K51.011 ULCERATIVE PANCOLITIS WITH RECTAL BLEEDING (H): ICD-10-CM

## 2023-08-21 DIAGNOSIS — K62.5 RECTAL BLEEDING: Primary | ICD-10-CM

## 2023-08-21 DIAGNOSIS — K64.8 INTERNAL HEMORRHOIDS: ICD-10-CM

## 2023-08-21 DIAGNOSIS — K59.03 DRUG-INDUCED CONSTIPATION: ICD-10-CM

## 2023-08-21 PROCEDURE — 99203 OFFICE O/P NEW LOW 30 MIN: CPT

## 2023-08-21 ASSESSMENT — ENCOUNTER SYMPTOMS
ABDOMINAL PAIN: 0
MYALGIAS: 1
ARTHRALGIAS: 1
PARALYSIS: 0
DECREASED CONCENTRATION: 1
BACK PAIN: 1
DISTURBANCES IN COORDINATION: 0
DEPRESSION: 1
BLOATING: 0
SPEECH CHANGE: 0
BOWEL INCONTINENCE: 0
HEMATURIA: 0
CONSTIPATION: 0
DIARRHEA: 0
NUMBNESS: 0
PANIC: 0
MEMORY LOSS: 0
VOMITING: 0
MUSCLE WEAKNESS: 0
NECK PAIN: 1
DYSURIA: 0
TINGLING: 0
HEARTBURN: 0
HEADACHES: 1
LOSS OF CONSCIOUSNESS: 0
MUSCLE CRAMPS: 1
JAUNDICE: 0
SEIZURES: 0
STIFFNESS: 1
NAUSEA: 0
BLOOD IN STOOL: 0
INSOMNIA: 1
JOINT SWELLING: 0
DIZZINESS: 0
DIFFICULTY URINATING: 1
FLANK PAIN: 0
TREMORS: 0
NERVOUS/ANXIOUS: 1
RECTAL PAIN: 1
WEAKNESS: 0

## 2023-08-21 ASSESSMENT — PAIN SCALES - GENERAL: PAINLEVEL: MILD PAIN (3)

## 2023-08-21 NOTE — PROGRESS NOTES
"Colon and Rectal Surgery Consult Clinic Note    Date: 2023     Referring provider:  Francisco Moses MD  [address] [place] [address]     RE: Parrish Muir  : 1965  SKIP: 2023    Parrish Muir is a very pleasant 58 year old male with a history of ulcerative colitis here with concerns for rectal bleeding.    Parrish reports rectal bleeding for about 30 years now. He attributes this to both UC and hemorrhoids. He reports about 8 months ago a very painful hemorrhoid \"burst\" and he had a lot of bleeding. Otherwise no rectal pain with bowel movements. Sometimes he feels sore if he sits for too long. He has bleeding with about 20% of bowel movements, almost none in the last few months since restarting UC meds. He is concerned about hemorrhoids causing cancer. He is also concerned that he had 2 episodes of very painful hemorrhoids that physically blocked his bowel movements in the last 6 months, and he needed to use a q-tip to help disimpact. He is on chronic oxycodone and oxycotin. He usually has 1 formed bowel movement daily.     Last colonoscopy 2018 with moderate distal sigmoid colitis. There was a 2 mm cecal polyp that could not be retrieved due to patient starting to wretch (intolerance to MAC). He was diagnosed with UC in  and is was previously maintained on Balsalazide 2.25g TID but he went off his medications for a while during the pandemic. He was recently restarted on Lialda and Rowasa a few months ago by Dr. Moses.     Physical Examination: Exam was chaperoned by Hernandez Cloud, EMT-P   BP (!) 148/90 (BP Location: Left arm, Patient Position: Sitting, Cuff Size: Adult Large)   Pulse 83   SpO2 95%   General: alert, oriented, in no acute distress, sitting comfortably  HEENT: moist mucous membranes  Perianal external examination:  Perianal skin: Intact with no excoriation or lichenification.  Lesions: No evidence of an external lesion, nodularity, or induration in the perianal " region.  Eversion of buttocks: There was not evidence of an anal fissure. Details: N/A.  Skin tags or external hemorrhoids: Yes: external hemorrhoid right posterior.    Digital rectal examination: Was performed.   Sphincter tone: Good.  Palpable lesions: No.  Prostate: Not assessed.  Other: None.    Anoscopy: Was performed.   Hemorrhoids: Yes. Moderate internal hemorrhoids throughout; the right anterior has evidence of a thrombosis but is otherwise soft.   Lesions: No    Assessment/Plan: Parrish Muir is a 58 year old male with ulcerative colitis, here for rectal bleeding. Discussed with him that most of his bleeding is likely related to his ulcerative colitis, especially given that he was off medications for a while and recently restarted with improvement of bleeding. It also sounds like he has recurrent thrombosed external hemorrhoids and he does have some thrombosis on exam today. Discussed that if he gets another flare, I would like to see him in clinic to confirm that it is a thrombosed external hemorrhoid and it may be amenable to excision in clinic. Otherwise start a daily fiber supplement. He has a colonoscopy with Dr. Moses in October. Follow up as needed. Patient's questions were answered to his stated satisfaction and he is in agreement with this plan.     Medical history:  Past Medical History:   Diagnosis Date    Chronic pain syndrome     Followed by Gokul Negrete MD at a Pain Clinic in [place].    Depression     Episodic tension-type headache, not intractable     Hypertension     Ulcerative colitis (H) 2003       Surgical history:  Past Surgical History:   Procedure Laterality Date    COLONOSCOPY WITH CO2 INSUFFLATION N/A 11/6/2015    Procedure: COLONOSCOPY WITH CO2 INSUFFLATION;  Surgeon: Francisco Grigsby MD;  Location:  OR       Problem list:    Patient Active Problem List    Diagnosis Date Noted    Chronic pain syndrome      Priority: Medium     Followed by Gokul Negrete MD at a Pain Clinic in  [place].      Anxiety 01/29/2016     Priority: Medium    Benign neoplasm of colon 10/23/2015     Priority: Medium    History of colonic polyps 09/18/2015     Priority: Medium    HTN (hypertension) 03/27/2015     Priority: Medium    Moderate major depression (H) 03/27/2015     Priority: Medium    Ulcerative colitis (H) 10/02/2013     Priority: Medium       Medications:  Current Outpatient Medications   Medication Sig Dispense Refill    acetaminophen (ACETAMINOPHEN EXTRA STRENGTH) 500 MG tablet TAKE ONE TO TWO TABLETS BY MOUTH EVERY 6 HOURS AS NEEDED FOR MILD PAIN 120 tablet 0    baclofen (LIORESAL) 10 MG tablet       baclofen (LIORESAL) 10 MG tablet Take 1 tablet (10 mg) by mouth 2 times daily 90 tablet 1    benzoyl peroxide 10 % topical liquid Use daily as directed when showering and be careful about bleaching of towels 227 g 3    Camphor-Menthol-Methyl Sal 3.1-6-10 % PTCH Apply 1 patch topically      fluticasone (FLONASE) 50 MCG/ACT nasal spray INSTILL 1 SPRAY INTO EACH NOSTRIL ONCE DAILY 16 g 3    gabapentin (NEURONTIN) 300 MG capsule Take 1 capsule (300 mg) by mouth At Bedtime 30 capsule 1    losartan (COZAAR) 100 MG tablet TAKE 1 TABLET(100 MG) BY MOUTH DAILY 90 tablet 0    losartan (COZAAR) 100 MG tablet       mesalamine (LIALDA) 1.2 g DR tablet Take 3 tablets (3,600 mg) by mouth daily (with breakfast) 90 tablet 11    Mesalamine-Cleanser (ROWASA) 4 g KIT Place 4 g rectally every evening 30 kit 11    order for DME Equipment being ordered: Digital home blood pressure monitor kit 1 Units 0    oxyCODONE (OXYCONTIN) 10 MG 12 hr tablet Take 10 mg by mouth every 12 hours      oxyCODONE-acetaminophen (PERCOCET) 5-325 MG per tablet Take 1 tablet by mouth every 6 hours as needed for moderate to severe pain 120 tablet 0    polyethylene glycol (MIRALAX) 17 GM/Dose powder Take 17 g by mouth 2 times daily For a goal of 1-2 soft bowel movements daily. Take an additional 17g if you are not achieving this. 510 g 3    tamsulosin  (FLOMAX) 0.4 MG capsule Take 1 capsule (0.4 mg) by mouth daily 90 capsule 0       Allergies:  Allergies   Allergen Reactions    Morphine Sulfate      Severe hallucinations    Balsalazide      Severe constipation      Sulfasalazine      Severe abdominal pain    Hydrocodone-Acetaminophen Itching       Family history:  Family History   Problem Relation Age of Onset    Alzheimer Disease Mother     Hypertension Mother     Cerebrovascular Disease Father     Parkinsonism Father     Hypertension Father     Prostate Cancer Father     Depression Sister     Depression Brother     Depression Sister     Breast Cancer Sister     Depression Sister     Skin Cancer No family hx of     Melanoma No family hx of        Social history:  Social History     Tobacco Use    Smoking status: Never    Smokeless tobacco: Never   Substance Use Topics    Alcohol use: No    Marital status: .    Nursing Notes:   Hernandez Cloud, EMT  8/21/2023  9:53 AM  Signed  Chief Complaint   Patient presents with    Consult       Vitals:    08/21/23 0951   BP: (!) 148/90   BP Location: Left arm   Patient Position: Sitting   Cuff Size: Adult Large   Pulse: 83   SpO2: 95%       There is no height or weight on file to calculate BMI.    Hernandez Cloud EMT-P       30 minutes spent on the date of encounter performing chart review, history and exam, documentation and further activities as noted above with an additional 2 minutes for anoscopy.     ----------------------------------------------------  Marisol White PA-C  Colon and Rectal Surgery   [facility]

## 2023-08-21 NOTE — NURSING NOTE
Chief Complaint   Patient presents with    Consult       Vitals:    08/21/23 0951   BP: (!) 148/90   BP Location: Left arm   Patient Position: Sitting   Cuff Size: Adult Large   Pulse: 83   SpO2: 95%       There is no height or weight on file to calculate BMI.    Hernandez Cloud EMT-P

## 2023-08-21 NOTE — PATIENT INSTRUCTIONS
Start a daily fiber supplement such as Citrucel or Metamucil. Start with once a day. These are found over the counter.    Continue following up with your gastroenterologist for management of your ulcerative colitis.

## 2023-08-21 NOTE — LETTER
"2023       RE: Parrish Muir  [address]  [place] [address]       Dear Colleague,    Thank you for referring your patient, Parrish Muir, to the [facility] COLON [facility]AL SURGERY [facility]INIC [place] [facility]. Please see a copy of my visit note below.    Colon and Rectal Surgery Consult Clinic Note    Date: 2023     Referring provider:  Francisco Moses MD  [address] [place] [address]     RE: Parrish Muir  : 1965  SKIP: 2023    Parrish Muir is a very pleasant 58 year old male with a history of ulcerative colitis here with concerns for rectal bleeding.    Parrish reports rectal bleeding for about 30 years now. He attributes this to both UC and hemorrhoids. He reports about 8 months ago a very painful hemorrhoid \"burst\" and he had a lot of bleeding. Otherwise no rectal pain with bowel movements. Sometimes he feels sore if he sits for too long. He has bleeding with about 20% of bowel movements, almost none in the last few months since restarting UC meds. He is concerned about hemorrhoids causing cancer. He is also concerned that he had 2 episodes of very painful hemorrhoids that physically blocked his bowel movements in the last 6 months, and he needed to use a q-tip to help disimpact. He is on chronic oxycodone and oxycotin. He usually has 1 formed bowel movement daily.     Last colonoscopy 2018 with moderate distal sigmoid colitis. There was a 2 mm cecal polyp that could not be retrieved due to patient starting to wretch (intolerance to MAC). He was diagnosed with UC in  and is was previously maintained on Balsalazide 2.25g TID but he went off his medications for a while during the pandemic. He was recently restarted on Lialda and Rowasa a few months ago by Dr. Moses.     Physical Examination: Exam was chaperoned by Hernandez Cloud, EMT-P   BP (!) 148/90 (BP Location: Left arm, Patient Position: " Sitting, Cuff Size: Adult Large)   Pulse 83   SpO2 95%   General: alert, oriented, in no acute distress, sitting comfortably  HEENT: moist mucous membranes  Perianal external examination:  Perianal skin: Intact with no excoriation or lichenification.  Lesions: No evidence of an external lesion, nodularity, or induration in the perianal region.  Eversion of buttocks: There was not evidence of an anal fissure. Details: N/A.  Skin tags or external hemorrhoids: Yes: external hemorrhoid right posterior.    Digital rectal examination: Was performed.   Sphincter tone: Good.  Palpable lesions: No.  Prostate: Not assessed.  Other: None.    Anoscopy: Was performed.   Hemorrhoids: Yes. Moderate internal hemorrhoids throughout; the right anterior has evidence of a thrombosis but is otherwise soft.   Lesions: No    Assessment/Plan: Parrish Muir is a 58 year old male with ulcerative colitis, here for rectal bleeding. Discussed with him that most of his bleeding is likely related to his ulcerative colitis, especially given that he was off medications for a while and recently restarted with improvement of bleeding. It also sounds like he has recurrent thrombosed external hemorrhoids and he does have some thrombosis on exam today. Discussed that if he gets another flare, I would like to see him in clinic to confirm that it is a thrombosed external hemorrhoid and it may be amenable to excision in clinic. Otherwise start a daily fiber supplement. He has a colonoscopy with Dr. Moses in October. Follow up as needed. Patient's questions were answered to his stated satisfaction and he is in agreement with this plan.     Medical history:  Past Medical History:   Diagnosis Date    Chronic pain syndrome     Followed by Gokul Negrete MD at a Pain Clinic in [place].    Depression     Episodic tension-type headache, not intractable     Hypertension     Ulcerative colitis (H) 2003       Surgical history:  Past Surgical History:   Procedure  Laterality Date    COLONOSCOPY WITH CO2 INSUFFLATION N/A 11/6/2015    Procedure: COLONOSCOPY WITH CO2 INSUFFLATION;  Surgeon: Francisco Grigsby MD;  Location: [facility] OR       Problem list:    Patient Active Problem List    Diagnosis Date Noted    Chronic pain syndrome      Priority: Medium     Followed by Gokul Negrete MD at a Pain Clinic in [place].      Anxiety 01/29/2016     Priority: Medium    Benign neoplasm of colon 10/23/2015     Priority: Medium    History of colonic polyps 09/18/2015     Priority: Medium    HTN (hypertension) 03/27/2015     Priority: Medium    Moderate major depression (H) 03/27/2015     Priority: Medium    Ulcerative colitis (H) 10/02/2013     Priority: Medium       Medications:  Current Outpatient Medications   Medication Sig Dispense Refill    acetaminophen (ACETAMINOPHEN EXTRA STRENGTH) 500 MG tablet TAKE ONE TO TWO TABLETS BY MOUTH EVERY 6 HOURS AS NEEDED FOR MILD PAIN 120 tablet 0    baclofen (LIORESAL) 10 MG tablet       baclofen (LIORESAL) 10 MG tablet Take 1 tablet (10 mg) by mouth 2 times daily 90 tablet 1    benzoyl peroxide 10 % topical liquid Use daily as directed when showering and be careful about bleaching of towels 227 g 3    Camphor-Menthol-Methyl Sal 3.1-6-10 % PTCH Apply 1 patch topically      fluticasone (FLONASE) 50 MCG/ACT nasal spray INSTILL 1 SPRAY INTO EACH NOSTRIL ONCE DAILY 16 g 3    gabapentin (NEURONTIN) 300 MG capsule Take 1 capsule (300 mg) by mouth At Bedtime 30 capsule 1    losartan (COZAAR) 100 MG tablet TAKE 1 TABLET(100 MG) BY MOUTH DAILY 90 tablet 0    losartan (COZAAR) 100 MG tablet       mesalamine (LIALDA) 1.2 g DR tablet Take 3 tablets (3,600 mg) by mouth daily (with breakfast) 90 tablet 11    Mesalamine-Cleanser (ROWASA) 4 g KIT Place 4 g rectally every evening 30 kit 11    order for DME Equipment being ordered: Digital home blood pressure monitor kit 1 Units 0    oxyCODONE (OXYCONTIN) 10 MG 12 hr tablet Take 10 mg by mouth every 12 hours       oxyCODONE-acetaminophen (PERCOCET) 5-325 MG per tablet Take 1 tablet by mouth every 6 hours as needed for moderate to severe pain 120 tablet 0    polyethylene glycol (MIRALAX) 17 GM/Dose powder Take 17 g by mouth 2 times daily For a goal of 1-2 soft bowel movements daily. Take an additional 17g if you are not achieving this. 510 g 3    tamsulosin (FLOMAX) 0.4 MG capsule Take 1 capsule (0.4 mg) by mouth daily 90 capsule 0       Allergies:  Allergies   Allergen Reactions    Morphine Sulfate      Severe hallucinations    Balsalazide      Severe constipation      Sulfasalazine      Severe abdominal pain    Hydrocodone-Acetaminophen Itching       Family history:  Family History   Problem Relation Age of Onset    Alzheimer Disease Mother     Hypertension Mother     Cerebrovascular Disease Father     Parkinsonism Father     Hypertension Father     Prostate Cancer Father     Depression Sister     Depression Brother     Depression Sister     Breast Cancer Sister     Depression Sister     Skin Cancer No family hx of     Melanoma No family hx of        Social history:  Social History     Tobacco Use    Smoking status: Never    Smokeless tobacco: Never   Substance Use Topics    Alcohol use: No    Marital status: .    Nursing Notes:   Hernandez Cloud, EMT  8/21/2023  9:53 AM  Signed  Chief Complaint   Patient presents with    Consult       Vitals:    08/21/23 0951   BP: (!) 148/90   BP Location: Left arm   Patient Position: Sitting   Cuff Size: Adult Large   Pulse: 83   SpO2: 95%       There is no height or weight on file to calculate BMI.    Hernandez Cloud EMT-P       30 minutes spent on the date of encounter performing chart review, history and exam, documentation and further activities as noted above with an additional 2 minutes for anoscopy.     ----------------------------------------------------        Again, thank you for allowing me to participate in the care of your patient.      Sincerely,    Marisol White PA-C

## 2023-09-05 DIAGNOSIS — G43.009 MIGRAINE WITHOUT AURA AND WITHOUT STATUS MIGRAINOSUS, NOT INTRACTABLE: ICD-10-CM

## 2023-09-08 RX ORDER — ACETAMINOPHEN 500 MG
TABLET ORAL
Qty: 120 TABLET | Refills: 0 | Status: SHIPPED | OUTPATIENT
Start: 2023-09-08 | End: 2023-12-13

## 2023-10-11 ENCOUNTER — TELEPHONE (OUTPATIENT)
Dept: GASTROENTEROLOGY | Facility: CLINIC | Age: 58
End: 2023-10-11
Payer: COMMERCIAL

## 2023-10-11 DIAGNOSIS — K51.00 ULCERATIVE PANCOLITIS (H): Primary | ICD-10-CM

## 2023-10-11 RX ORDER — BISACODYL 5 MG/1
TABLET, DELAYED RELEASE ORAL
Qty: 4 TABLET | Refills: 0 | Status: SHIPPED | OUTPATIENT
Start: 2023-10-11 | End: 2023-12-04

## 2023-10-11 NOTE — TELEPHONE ENCOUNTER
Attempted to contact patient in order to complete pre assessment questions.     No answer. Left message to return call to [phone] option 4      Procedure details:    Patient scheduled for Colonoscopy  on 10.26.23.     Arrival time: 1130. Procedure time 1300    Pre op exam needed? N/A    Facility location: [facility]; [address], [address], [place] [address]    Sedation type: MAC    Indication for procedure: UC      Chart review:     Electronic implanted devices? No    Diabetic? No      Medication review:    Anticoagulants? No    NSAIDS? No NSAID medications per patient's medication list.  RN will verify with pre-assessment call.    Other medication HOLDING recommendations:  N/A      Prep for procedure:     Bowel prep recommendation: Standard Golytely   Due to:  had same in the past with good result. Did not want to tier down    Prep instructions sent via [employer]. Bowel prep script sent to    [facility] [place] - [address]        Malinda Gallardo RN  Endoscopy Procedure Pre Assessment RN

## 2023-10-16 DIAGNOSIS — I10 BENIGN ESSENTIAL HYPERTENSION: ICD-10-CM

## 2023-10-16 NOTE — TELEPHONE ENCOUNTER
Second call attempt to complete pre assessment.     No answer.  Left message to return call to [phone] #4 within 24 hours or risk procedure being cancelled.     Additional information needed?  N/A      Malinda Gallardo RN  Endoscopy Procedure Pre Assessment RN

## 2023-10-17 ENCOUNTER — TELEPHONE (OUTPATIENT)
Dept: GASTROENTEROLOGY | Facility: CLINIC | Age: 58
End: 2023-10-17
Payer: COMMERCIAL

## 2023-10-17 NOTE — TELEPHONE ENCOUNTER
Incoming call from patient to complete pre assessment     Procedure was cancelled due to pre assessment not completed.     Patient was upset stating that the message stated they had 24 hours and that should be implied that it is end of next business day. Patient wanted name of supervisor. Writer gave name of nursing supervisor and informed patient that they would escalate on their behalf.     Warm transfer to endoscopy scheduling to reschedule MAC at [facility] per order.    Katerine Marie RN  Endoscopy Procedure Pre Assessment RN  [phone] option 4

## 2023-10-17 NOTE — TELEPHONE ENCOUNTER
----- Message from Malinda Gallardo RN sent at 10/17/2023  3:31 PM CDT -----  Regarding: cancel  Hello,    No return call received.   Pre assessment was not completed for upcoming scheduled procedure.    Please cancel per policy,    Thank you,      Malinda

## 2023-10-17 NOTE — TELEPHONE ENCOUNTER
Reason for Reschedule/Cancellation (please be detailed, any staff messages or encounters to note?): pre assessment call not returned       Prior to reschedule please review:  Ordering Provider: Ulcerative pancolitis without complication (H)   Sedation per order: MAC  Does patient have any ASC Exclusions, please identify?: n      Notes on Cancelled Procedure:  Procedure: Lower Endoscopy [Colonoscopy]   Date: 10/26/2023  Location: [facility]; [address] [place] [address], [address] Floor, [place] [address]  Surgeon: MICHEL Moses      Rescheduled: No

## 2023-10-17 NOTE — TELEPHONE ENCOUNTER
No return call received.   Pre assessment was not completed for upcoming scheduled procedure.     Staff message sent to endoscopy scheduling to cancel procedure per policy.       Malinda Gallardo RN   Endoscopy Procedure Pre Assessment RN

## 2023-10-17 NOTE — TELEPHONE ENCOUNTER
Patient called back and requested his spot back on 10/26/23.  Looks like that spot is held by [facility] for another patient.  I informed the patient that his scheduled date and time have been held for another patient, and that I would need to check into this further.  Patient is not happy but did agree to talk with me tomorrow after I have had time to check into this    Rescheduled: No       Send In - basket message to Panc - Gianfranco Pool if [facility]  procedure is canceled or rescheduled: [ N/A, YES or NO] NA

## 2023-10-17 NOTE — TELEPHONE ENCOUNTER
losartan (COZAAR) 100 MG tablet 90 tablet 0 7/21/2023     Last Office Visit : 5/9/2022   Future Office visit:  12/4/23    Routing refill request to provider for review/approval because:  K+, Cr overdue. Overdue annual visit (upcoming appt 12/4). Review BP's. RX PENDED.     Last blood pressure under 140/90 in past 12 months    Recent (12 mo) or future (30 days) visit within the authorizing provider's specialty    Normal serum creatinine on file in past 12 months    Normal serum potassium on file in past 12 months     BP Readings from Last 3 Encounters:   08/21/23 (!) 148/90   05/30/23 (!) 162/87   02/01/21 (!) 143/99     Creatinine   Date Value Ref Range Status   02/01/2021 0.90 0.66 - 1.25 mg/dL Final     Potassium   Date Value Ref Range Status   02/01/2021 4.0 3.4 - 5.3 mmol/L Final

## 2023-10-18 RX ORDER — LOSARTAN POTASSIUM 100 MG/1
100 TABLET ORAL DAILY
Qty: 90 TABLET | Refills: 0 | Status: SHIPPED | OUTPATIENT
Start: 2023-10-18 | End: 2024-01-17

## 2023-10-18 NOTE — TELEPHONE ENCOUNTER
Pre visit planning completed.      Procedure details:    Patient scheduled for Colonoscopy  on 10/26/2023.     Arrival time: 1130. Procedure time 1300    Pre op exam needed? N/A    Facility location: [facility]; [address] [place] [address], [address], [place] [address]    Sedation type: MAC    Indication for procedure: Ulcerative pancolitis without complication (H) [K51.00]       Chart review:     Electronic implanted devices? No    Diabetic? No    Diabetic medication HOLDING recommendations: (if applicable)  Oral diabetic medications: N/A  Diabetic injectables: N/A  Insulin: N/A      Medication review:    Anticoagulants? No    NSAIDS? No NSAID medications per patient's medication list.  RN will verify with pre-assessment call.    Other medication HOLDING recommendations:  N/A      Prep for procedure:     Bowel prep recommendation: Standard Golytely    Due to:  standard bowel prep.    Prep instructions sent via [employer] Bowel prep script sent to    [facility] - [place] - [address] [place] [address]          Flower Greene RN  Endoscopy Procedure Pre Assessment RN  [phone] option 4

## 2023-10-18 NOTE — TELEPHONE ENCOUNTER
Pre assessment completed for upcoming procedure.      Procedure details:    Patient scheduled for Colonoscopy  on 10.26.23.     Arrival time: 1130. Procedure time 1300    Pre op exam needed? N/A    Facility location: [facility]; [address] [place] [address], [address] Floor, [place] [address]    Sedation type: MAC    Indication for procedure:     COVID policy reviewed.    Designated  policy reviewed. Instructed to have someone stay 24 hours post procedure.       Chart review:     Electronic implanted devices? No    Diabetic? No      Medication review:    Anticoagulants? No    NSAIDS? No    Other medication HOLDING recommendations:  N/A      Prep for procedure:     Bowel prep recommendation: Standard Miralax  Due to:  Patient insisted on the miralax prep. Did discuss risk of incomplete clean out     Prep instructions sent via [employer]     Reviewed procedure prep instructions.     Patient verbalized understanding and had no questions or concerns at this time.        Malinda Gallardo RN  Endoscopy Procedure Pre Assessment RN  [phone] option 4

## 2023-10-18 NOTE — TELEPHONE ENCOUNTER
Rescheduled: Yes  Procedure: Lower Endoscopy [Colonoscopy]   Date: 10/26/23  Location: [facility]; [address], [address], [place] [address]  Surgeon: MICHEL Moses  Sedation Level Scheduled  MAC,  Reason for Sedation Level per order  Prep/Instructions updated and sent: Yes       Send In - basket message to Panc - Gianfranco Pool if EUS  procedure is canceled or rescheduled: [ N/A, YES or NO] NA

## 2023-10-25 ENCOUNTER — ANESTHESIA EVENT (OUTPATIENT)
Dept: GASTROENTEROLOGY | Facility: CLINIC | Age: 58
End: 2023-10-25
Payer: COMMERCIAL

## 2023-10-26 ENCOUNTER — HOSPITAL ENCOUNTER (OUTPATIENT)
Facility: CLINIC | Age: 58
Discharge: HOME | End: 2023-10-26
Attending: INTERNAL MEDICINE | Admitting: INTERNAL MEDICINE
Payer: COMMERCIAL

## 2023-10-26 ENCOUNTER — ANESTHESIA (OUTPATIENT)
Dept: GASTROENTEROLOGY | Facility: CLINIC | Age: 58
End: 2023-10-26
Payer: COMMERCIAL

## 2023-10-26 VITALS
RESPIRATION RATE: 16 BRPM | SYSTOLIC BLOOD PRESSURE: 117 MMHG | DIASTOLIC BLOOD PRESSURE: 76 MMHG | OXYGEN SATURATION: 99 % | HEART RATE: 71 BPM | TEMPERATURE: 98 F

## 2023-10-26 LAB — COLONOSCOPY: NORMAL

## 2023-10-26 PROCEDURE — 370N000017 HC ANESTHESIA TECHNICAL FEE, PER MIN: Performed by: INTERNAL MEDICINE

## 2023-10-26 PROCEDURE — 88305 TISSUE EXAM BY PATHOLOGIST: CPT | Mod: TC | Performed by: INTERNAL MEDICINE

## 2023-10-26 PROCEDURE — 250N000009 HC RX 250

## 2023-10-26 PROCEDURE — 88305 TISSUE EXAM BY PATHOLOGIST: CPT | Mod: 26 | Performed by: PATHOLOGY

## 2023-10-26 PROCEDURE — 258N000003 HC RX IP 258 OP 636

## 2023-10-26 PROCEDURE — 250N000011 HC RX IP 250 OP 636

## 2023-10-26 PROCEDURE — 45385 COLONOSCOPY W/LESION REMOVAL: CPT | Mod: PT | Performed by: INTERNAL MEDICINE

## 2023-10-26 PROCEDURE — 45380 COLONOSCOPY AND BIOPSY: CPT | Mod: XU,PT | Performed by: INTERNAL MEDICINE

## 2023-10-26 RX ORDER — ONDANSETRON 2 MG/ML
4 INJECTION INTRAMUSCULAR; INTRAVENOUS EVERY 30 MIN PRN
Status: CANCELLED | OUTPATIENT
Start: 2023-10-26

## 2023-10-26 RX ORDER — ONDANSETRON 4 MG/1
4 TABLET, ORALLY DISINTEGRATING ORAL EVERY 6 HOURS PRN
Status: CANCELLED | OUTPATIENT
Start: 2023-10-26

## 2023-10-26 RX ORDER — FLUMAZENIL 0.1 MG/ML
0.2 INJECTION, SOLUTION INTRAVENOUS
Status: CANCELLED | OUTPATIENT
Start: 2023-10-26 | End: 2023-10-27

## 2023-10-26 RX ORDER — OXYCODONE HYDROCHLORIDE 10 MG/1
10 TABLET ORAL
Status: CANCELLED | OUTPATIENT
Start: 2023-10-26

## 2023-10-26 RX ORDER — ONDANSETRON 2 MG/ML
4 INJECTION INTRAMUSCULAR; INTRAVENOUS EVERY 6 HOURS PRN
Status: CANCELLED | OUTPATIENT
Start: 2023-10-26

## 2023-10-26 RX ORDER — ONDANSETRON 4 MG/1
4 TABLET, ORALLY DISINTEGRATING ORAL EVERY 30 MIN PRN
Status: CANCELLED | OUTPATIENT
Start: 2023-10-26

## 2023-10-26 RX ORDER — LIDOCAINE HYDROCHLORIDE 20 MG/ML
INJECTION, SOLUTION INFILTRATION; PERINEURAL PRN
Status: DISCONTINUED | OUTPATIENT
Start: 2023-10-26 | End: 2023-10-26

## 2023-10-26 RX ORDER — PROCHLORPERAZINE MALEATE 10 MG
10 TABLET ORAL EVERY 6 HOURS PRN
Status: CANCELLED | OUTPATIENT
Start: 2023-10-26

## 2023-10-26 RX ORDER — SODIUM CHLORIDE, SODIUM LACTATE, POTASSIUM CHLORIDE, CALCIUM CHLORIDE 600; 310; 30; 20 MG/100ML; MG/100ML; MG/100ML; MG/100ML
INJECTION, SOLUTION INTRAVENOUS CONTINUOUS
Status: DISCONTINUED | OUTPATIENT
Start: 2023-10-26 | End: 2023-10-26 | Stop reason: HOSPADM

## 2023-10-26 RX ORDER — LIDOCAINE 40 MG/G
CREAM TOPICAL
Status: DISCONTINUED | OUTPATIENT
Start: 2023-10-26 | End: 2023-10-26 | Stop reason: HOSPADM

## 2023-10-26 RX ORDER — SODIUM CHLORIDE, SODIUM LACTATE, POTASSIUM CHLORIDE, CALCIUM CHLORIDE 600; 310; 30; 20 MG/100ML; MG/100ML; MG/100ML; MG/100ML
INJECTION, SOLUTION INTRAVENOUS CONTINUOUS PRN
Status: DISCONTINUED | OUTPATIENT
Start: 2023-10-26 | End: 2023-10-26

## 2023-10-26 RX ORDER — NALOXONE HYDROCHLORIDE 0.4 MG/ML
0.4 INJECTION, SOLUTION INTRAMUSCULAR; INTRAVENOUS; SUBCUTANEOUS
Status: CANCELLED | OUTPATIENT
Start: 2023-10-26

## 2023-10-26 RX ORDER — PROPOFOL 10 MG/ML
INJECTION, EMULSION INTRAVENOUS PRN
Status: DISCONTINUED | OUTPATIENT
Start: 2023-10-26 | End: 2023-10-26

## 2023-10-26 RX ORDER — PROPOFOL 10 MG/ML
INJECTION, EMULSION INTRAVENOUS CONTINUOUS PRN
Status: DISCONTINUED | OUTPATIENT
Start: 2023-10-26 | End: 2023-10-26

## 2023-10-26 RX ORDER — NALOXONE HYDROCHLORIDE 0.4 MG/ML
0.2 INJECTION, SOLUTION INTRAMUSCULAR; INTRAVENOUS; SUBCUTANEOUS
Status: CANCELLED | OUTPATIENT
Start: 2023-10-26

## 2023-10-26 RX ORDER — OXYCODONE HYDROCHLORIDE 5 MG/1
5 TABLET ORAL
Status: CANCELLED | OUTPATIENT
Start: 2023-10-26

## 2023-10-26 RX ADMIN — PROPOFOL 30 MG: 10 INJECTION, EMULSION INTRAVENOUS at 13:16

## 2023-10-26 RX ADMIN — PROPOFOL 150 MCG/KG/MIN: 10 INJECTION, EMULSION INTRAVENOUS at 13:04

## 2023-10-26 RX ADMIN — LIDOCAINE HYDROCHLORIDE 40 MG: 20 INJECTION, SOLUTION INFILTRATION; PERINEURAL at 13:11

## 2023-10-26 RX ADMIN — SODIUM CHLORIDE, POTASSIUM CHLORIDE, SODIUM LACTATE AND CALCIUM CHLORIDE: 600; 310; 30; 20 INJECTION, SOLUTION INTRAVENOUS at 13:04

## 2023-10-26 RX ADMIN — PROPOFOL 100 MG: 10 INJECTION, EMULSION INTRAVENOUS at 13:04

## 2023-10-26 ASSESSMENT — ACTIVITIES OF DAILY LIVING (ADL)
ADLS_ACUITY_SCORE: 35
ADLS_ACUITY_SCORE: 35

## 2023-10-26 NOTE — H&P
Parrish Muir  [id number]  male  58 year old      Reason for procedure/surgery: Chronic Ulcerative Colitis    Patient Active Problem List   Diagnosis    Ulcerative colitis (H)    HTN (hypertension)    Moderate major depression (H)    History of colonic polyps    Benign neoplasm of colon    Anxiety    Chronic pain syndrome       Past Surgical History:    Past Surgical History:   Procedure Laterality Date    COLONOSCOPY WITH CO2 INSUFFLATION N/A 11/6/2015    Procedure: COLONOSCOPY WITH CO2 INSUFFLATION;  Surgeon: Francisco Grigsby MD;  Location:  OR       Past Medical History:   Past Medical History:   Diagnosis Date    Chronic pain syndrome     Followed by Gokul Negrete MD at a Pain Clinic in [place].    Depression     Episodic tension-type headache, not intractable     Hypertension     Ulcerative colitis (H) 2003       Social History:   Social History     Tobacco Use    Smoking status: Never    Smokeless tobacco: Never   Substance Use Topics    Alcohol use: No       Family History:   Family History   Problem Relation Age of Onset    Alzheimer Disease Mother     Hypertension Mother     Cerebrovascular Disease Father     Parkinsonism Father     Hypertension Father     Prostate Cancer Father     Depression Sister     Depression Brother     Depression Sister     Breast Cancer Sister     Depression Sister     Skin Cancer No family hx of     Melanoma No family hx of        Allergies:   Allergies   Allergen Reactions    Morphine Sulfate      Severe hallucinations    Balsalazide      Severe constipation      Sulfasalazine      Severe abdominal pain    Hydrocodone-Acetaminophen Itching       Active Medications:   No current outpatient medications on file.       Systemic Review:   CONSTITUTIONAL: NEGATIVE for fever, chills, change in weight  ENT/MOUTH: NEGATIVE for ear, mouth and throat problems  RESP: NEGATIVE for significant cough or SOB  CV: NEGATIVE for chest pain, palpitations or peripheral edema    Physical  Examination:   Vital Signs: BP (!) 151/100   Pulse 68   Temp 98  F (36.7  C)   Resp 16   GENERAL: healthy, alert and no distress  NECK: no adenopathy, no asymmetry, masses, or scars  RESP: lungs clear to auscultation - no rales, rhonchi or wheezes  CV: regular rate and rhythm, normal S1 S2, no S3 or S4, no murmur, click or rub, no peripheral edema and peripheral pulses strong  ABDOMEN: soft, nontender, no hepatosplenomegaly, no masses and bowel sounds normal  MS: no gross musculoskeletal defects noted, no edema    ASA: 2    Mallampati Score: 2    Plan: Appropriate to proceed as scheduled.      Francisco Moses MD  10/26/2023    PCP:  Seb Lafleur

## 2023-10-26 NOTE — ANESTHESIA CARE TRANSFER NOTE
Patient: Parrish Muir    Procedure: Procedure(s):  COLONOSCOPY, WITH POLYPECTOMY AND BIOPSY       Diagnosis: Ulcerative pancolitis without complication (H) [K51.00]  Diagnosis Additional Information: No value filed.    Anesthesia Type:   No value filed.     Note:    Oropharynx: oropharynx clear of all foreign objects and spontaneously breathing  Level of Consciousness: drowsy  Oxygen Supplementation: nasal cannula  Level of Supplemental Oxygen (L/min / FiO2): 2  Independent Airway: airway patency satisfactory and stable  Dentition: dentition unchanged  Vital Signs Stable: post-procedure vital signs reviewed and stable  Report to RN Given: handoff report given  Patient transferred to: Phase II    Handoff Report: Identifed the Patient, Identified the Reponsible Provider, Reviewed the pertinent medical history, Discussed the surgical course, Reviewed Intra-OP anesthesia mangement and issues during anesthesia, Set expectations for post-procedure period and Allowed opportunity for questions and acknowledgement of understanding      Vitals:  Vitals Value Taken Time   /55 10/26/23 1344   Temp     Pulse 78 10/26/23 1344   Resp 20    SpO2 98 % 10/26/23 1345   Vitals shown include unfiled device data.    Electronically Signed By: JULIETH Gonzalez CRNA  October 26, 2023  1:46 PM

## 2023-10-27 NOTE — ANESTHESIA POSTPROCEDURE EVALUATION
Patient: Parrish Muir    Procedure: Procedure(s):  COLONOSCOPY, WITH POLYPECTOMY AND BIOPSY       Anesthesia Type:  No value filed.    Note:  Disposition: Outpatient   Postop Pain Control: Uneventful            Sign Out: Well controlled pain   PONV: No   Neuro/Psych: Uneventful            Sign Out: Acceptable/Baseline neuro status   Airway/Respiratory: Uneventful            Sign Out: Acceptable/Baseline resp. status   CV/Hemodynamics: Uneventful            Sign Out: Acceptable CV status; No obvious hypovolemia; No obvious fluid overload   Other NRE: NONE   DID A NON-ROUTINE EVENT OCCUR? No       Last vitals:  Vitals Value Taken Time   /76 10/26/23 1400   Temp     Pulse 71 10/26/23 1400   Resp 16 10/26/23 1400   SpO2 99 % 10/26/23 1413   Vitals shown include unfiled device data.    Electronically Signed By: Jin Neil MD  October 27, 2023  1:14 PM

## 2023-10-27 NOTE — ANESTHESIA PREPROCEDURE EVALUATION
Anesthesia Pre-Procedure Evaluation    Patient: Parrish Muir   MRN: [id number] : 1965        Procedure : Procedure(s):  COLONOSCOPY, WITH POLYPECTOMY AND BIOPSY          Past Medical History:   Diagnosis Date     Chronic pain syndrome     Followed by Gokul Negrete MD at a Pain Clinic in [place].     Depression      Episodic tension-type headache, not intractable      Hypertension      Ulcerative colitis (H)       Past Surgical History:   Procedure Laterality Date     COLONOSCOPY N/A 10/26/2023    Procedure: COLONOSCOPY, WITH POLYPECTOMY AND BIOPSY;  Surgeon: Francisco Moses MD;  Location: [facility]     COLONOSCOPY WITH CO2 INSUFFLATION N/A 2015    Procedure: COLONOSCOPY WITH CO2 INSUFFLATION;  Surgeon: Francisco Grigsby MD;  Location: [facility] OR      Allergies   Allergen Reactions     Morphine Sulfate      Severe hallucinations     Balsalazide      Severe constipation       Sulfasalazine      Severe abdominal pain     Hydrocodone-Acetaminophen Itching      Social History     Tobacco Use     Smoking status: Never     Smokeless tobacco: Never   Substance Use Topics     Alcohol use: No      Wt Readings from Last 1 Encounters:   23 105.4 kg (232 lb 4.8 oz)        Anesthesia Evaluation            ROS/MED HX  ENT/Pulmonary:       Neurologic:       Cardiovascular:     (+)  hypertension- -   -  - -                                      METS/Exercise Tolerance:     Hematologic:       Musculoskeletal:       GI/Hepatic: Comment: UC      Renal/Genitourinary:       Endo:       Psychiatric/Substance Use:     (+) psychiatric history 1 and depression       Infectious Disease:       Malignancy:       Other:          Physical Exam    Airway        Mallampati: II   TM distance: > 3 FB   Neck ROM: full   Mouth opening: > 3 cm    Respiratory Devices and Support         Dental       (+) Minor Abnormalities - some fillings, tiny chips      Cardiovascular   cardiovascular exam normal       Rhythm and rate: regular and normal  "    Pulmonary   pulmonary exam normal        breath sounds clear to auscultation       OUTSIDE LABS:  CBC:   Lab Results   Component Value Date    WBC 6.6 08/15/2019    WBC 8.0 03/11/2019    HGB 14.0 08/15/2019    HGB 14.5 03/11/2019    HCT 42.8 08/15/2019    HCT 43.0 03/11/2019     08/15/2019     03/11/2019     BMP:   Lab Results   Component Value Date     02/01/2021     08/15/2019    POTASSIUM 4.0 02/01/2021    POTASSIUM 4.6 08/15/2019    CHLORIDE 104 02/01/2021    CHLORIDE 105 08/15/2019    CO2 32 02/01/2021    CO2 31 08/15/2019    BUN 12 02/01/2021    BUN 15 08/15/2019    CR 0.90 02/01/2021    CR 1.03 08/15/2019     (H) 02/01/2021     (H) 08/15/2019     COAGS:   Lab Results   Component Value Date    PTT 30 03/11/2019    INR 1.04 03/11/2019     POC: No results found for: \"BGM\", \"HCG\", \"HCGS\"  HEPATIC:   Lab Results   Component Value Date    ALBUMIN 4.2 02/01/2021    PROTTOTAL 8.0 02/01/2021    ALT 30 02/01/2021    AST 25 02/01/2021    GGT 14 11/30/2005    ALKPHOS 70 02/01/2021    BILITOTAL 0.4 02/01/2021     OTHER:   Lab Results   Component Value Date    A1C 6.1 (H) 06/25/2019    TABATHA 9.5 02/01/2021    PHOS 4.0 12/01/2005    MAG 2.0 12/01/2005    LIPASE 229 12/01/2005    AMYLASE 55 12/01/2005    TSH 1.87 06/25/2019    CRP <2.9 08/15/2019    SED 6 08/15/2019       Anesthesia Plan    ASA Status:  2    NPO Status:  NPO Appropriate    Anesthesia Type: MAC.     - Reason for MAC: straight local not clinically adequate   Induction: Intravenous.   Maintenance: TIVA.        Consents    Anesthesia Plan(s) and associated risks, benefits, and realistic alternatives discussed. Questions answered and patient/representative(s) expressed understanding.     - Discussed: Risks, Benefits and Alternatives for BOTH SEDATION and the PROCEDURE were discussed     - Discussed with:  Patient      - Extended Intubation/Ventilatory Support Discussed: No.      - Patient is DNR/DNI Status: No     Use of " blood products discussed: No .     Postoperative Care    Pain management: IV analgesics, Oral pain medications.   PONV prophylaxis: Ondansetron (or other 5HT-3)     Comments:              Jin Neil MD

## 2023-10-28 LAB
PATH REPORT.COMMENTS IMP SPEC: NORMAL
PATH REPORT.COMMENTS IMP SPEC: NORMAL
PATH REPORT.FINAL DX SPEC: NORMAL
PATH REPORT.GROSS SPEC: NORMAL
PATH REPORT.MICROSCOPIC SPEC OTHER STN: NORMAL
PATH REPORT.RELEVANT HX SPEC: NORMAL
PHOTO IMAGE: NORMAL

## 2023-12-04 ENCOUNTER — TELEPHONE (OUTPATIENT)
Dept: INTERNAL MEDICINE | Facility: CLINIC | Age: 58
End: 2023-12-04

## 2023-12-04 ENCOUNTER — LAB (OUTPATIENT)
Dept: LAB | Facility: CLINIC | Age: 58
End: 2023-12-04
Payer: COMMERCIAL

## 2023-12-04 ENCOUNTER — OFFICE VISIT (OUTPATIENT)
Dept: INTERNAL MEDICINE | Facility: CLINIC | Age: 58
End: 2023-12-04
Payer: COMMERCIAL

## 2023-12-04 VITALS
DIASTOLIC BLOOD PRESSURE: 89 MMHG | BODY MASS INDEX: 33.58 KG/M2 | SYSTOLIC BLOOD PRESSURE: 136 MMHG | HEART RATE: 88 BPM | WEIGHT: 227.4 LBS | OXYGEN SATURATION: 97 %

## 2023-12-04 DIAGNOSIS — F32.0 CURRENT MILD EPISODE OF MAJOR DEPRESSIVE DISORDER, UNSPECIFIED WHETHER RECURRENT (H): ICD-10-CM

## 2023-12-04 DIAGNOSIS — I10 PRIMARY HYPERTENSION: ICD-10-CM

## 2023-12-04 DIAGNOSIS — Z00.00 ENCOUNTER FOR ROUTINE HISTORY AND PHYSICAL EXAM FOR MALE: ICD-10-CM

## 2023-12-04 DIAGNOSIS — R35.0 BENIGN PROSTATIC HYPERPLASIA WITH URINARY FREQUENCY: ICD-10-CM

## 2023-12-04 DIAGNOSIS — Z00.00 ENCOUNTER FOR ROUTINE HISTORY AND PHYSICAL EXAM FOR MALE: Primary | ICD-10-CM

## 2023-12-04 DIAGNOSIS — K51.00 ULCERATIVE PANCOLITIS WITHOUT COMPLICATION (H): ICD-10-CM

## 2023-12-04 DIAGNOSIS — I10 PRIMARY HYPERTENSION: Primary | ICD-10-CM

## 2023-12-04 DIAGNOSIS — N40.1 BENIGN PROSTATIC HYPERPLASIA WITH URINARY FREQUENCY: ICD-10-CM

## 2023-12-04 DIAGNOSIS — L60.9 FINGERNAIL ABNORMALITIES: ICD-10-CM

## 2023-12-04 LAB
ALBUMIN SERPL BCG-MCNC: 4.5 G/DL (ref 3.5–5.2)
ALP SERPL-CCNC: 89 U/L (ref 40–150)
ALT SERPL W P-5'-P-CCNC: 43 U/L (ref 0–70)
ANION GAP SERPL CALCULATED.3IONS-SCNC: 11 MMOL/L (ref 7–15)
AST SERPL W P-5'-P-CCNC: 37 U/L (ref 0–45)
BILIRUB SERPL-MCNC: 0.5 MG/DL
BUN SERPL-MCNC: 11.8 MG/DL (ref 8–23)
CALCIUM SERPL-MCNC: 9.8 MG/DL (ref 8.6–10)
CHLORIDE SERPL-SCNC: 101 MMOL/L (ref 98–107)
CHOLEST SERPL-MCNC: 196 MG/DL
CREAT SERPL-MCNC: 0.95 MG/DL (ref 0.67–1.17)
DEPRECATED HCO3 PLAS-SCNC: 27 MMOL/L (ref 22–29)
EGFRCR SERPLBLD CKD-EPI 2021: >90 ML/MIN/1.73M2
ERYTHROCYTE [DISTWIDTH] IN BLOOD BY AUTOMATED COUNT: 12.3 % (ref 10–15)
GLUCOSE SERPL-MCNC: 108 MG/DL (ref 70–99)
HBV SURFACE AB SERPL IA-ACNC: 2.8 M[IU]/ML
HBV SURFACE AB SERPL IA-ACNC: NONREACTIVE M[IU]/ML
HCT VFR BLD AUTO: 40.9 % (ref 40–53)
HDLC SERPL-MCNC: 49 MG/DL
HGB BLD-MCNC: 14 G/DL (ref 13.3–17.7)
HIV 1+2 AB+HIV1 P24 AG SERPL QL IA: NONREACTIVE
LDLC SERPL CALC-MCNC: 119 MG/DL
MCH RBC QN AUTO: 29.7 PG (ref 26.5–33)
MCHC RBC AUTO-ENTMCNC: 34.2 G/DL (ref 31.5–36.5)
MCV RBC AUTO: 87 FL (ref 78–100)
NONHDLC SERPL-MCNC: 147 MG/DL
PLATELET # BLD AUTO: 262 10E3/UL (ref 150–450)
POTASSIUM SERPL-SCNC: 4 MMOL/L (ref 3.4–5.3)
PROT SERPL-MCNC: 7.8 G/DL (ref 6.4–8.3)
PSA SERPL DL<=0.01 NG/ML-MCNC: 0.4 NG/ML (ref 0–3.5)
RBC # BLD AUTO: 4.71 10E6/UL (ref 4.4–5.9)
SODIUM SERPL-SCNC: 139 MMOL/L (ref 135–145)
TRIGL SERPL-MCNC: 139 MG/DL
TSH SERPL DL<=0.005 MIU/L-ACNC: 2.42 UIU/ML (ref 0.3–4.2)
WBC # BLD AUTO: 6.2 10E3/UL (ref 4–11)

## 2023-12-04 PROCEDURE — 80061 LIPID PANEL: CPT | Performed by: PATHOLOGY

## 2023-12-04 PROCEDURE — 84443 ASSAY THYROID STIM HORMONE: CPT | Performed by: PATHOLOGY

## 2023-12-04 PROCEDURE — 80053 COMPREHEN METABOLIC PANEL: CPT | Performed by: PATHOLOGY

## 2023-12-04 PROCEDURE — 90471 IMMUNIZATION ADMIN: CPT | Performed by: PEDIATRICS

## 2023-12-04 PROCEDURE — 86706 HEP B SURFACE ANTIBODY: CPT | Performed by: PEDIATRICS

## 2023-12-04 PROCEDURE — 87389 HIV-1 AG W/HIV-1&-2 AB AG IA: CPT | Performed by: PEDIATRICS

## 2023-12-04 PROCEDURE — 85027 COMPLETE CBC AUTOMATED: CPT | Performed by: PATHOLOGY

## 2023-12-04 PROCEDURE — G0103 PSA SCREENING: HCPCS | Performed by: PATHOLOGY

## 2023-12-04 PROCEDURE — 99000 SPECIMEN HANDLING OFFICE-LAB: CPT | Performed by: PATHOLOGY

## 2023-12-04 PROCEDURE — 99396 PREV VISIT EST AGE 40-64: CPT | Mod: 25 | Performed by: PEDIATRICS

## 2023-12-04 PROCEDURE — 90746 HEPB VACCINE 3 DOSE ADULT IM: CPT | Performed by: PEDIATRICS

## 2023-12-04 PROCEDURE — 36415 COLL VENOUS BLD VENIPUNCTURE: CPT | Performed by: PATHOLOGY

## 2023-12-04 RX ORDER — TAMSULOSIN HYDROCHLORIDE 0.4 MG/1
0.4 CAPSULE ORAL DAILY
Qty: 90 CAPSULE | Refills: 3 | Status: SHIPPED | OUTPATIENT
Start: 2023-12-04

## 2023-12-04 ASSESSMENT — ANXIETY QUESTIONNAIRES
2. NOT BEING ABLE TO STOP OR CONTROL WORRYING: SEVERAL DAYS
GAD7 TOTAL SCORE: 13
3. WORRYING TOO MUCH ABOUT DIFFERENT THINGS: SEVERAL DAYS
IF YOU CHECKED OFF ANY PROBLEMS ON THIS QUESTIONNAIRE, HOW DIFFICULT HAVE THESE PROBLEMS MADE IT FOR YOU TO DO YOUR WORK, TAKE CARE OF THINGS AT HOME, OR GET ALONG WITH OTHER PEOPLE: SOMEWHAT DIFFICULT
GAD7 TOTAL SCORE: 13
1. FEELING NERVOUS, ANXIOUS, OR ON EDGE: MORE THAN HALF THE DAYS
7. FEELING AFRAID AS IF SOMETHING AWFUL MIGHT HAPPEN: MORE THAN HALF THE DAYS
6. BECOMING EASILY ANNOYED OR IRRITABLE: MORE THAN HALF THE DAYS
5. BEING SO RESTLESS THAT IT IS HARD TO SIT STILL: MORE THAN HALF THE DAYS

## 2023-12-04 ASSESSMENT — PATIENT HEALTH QUESTIONNAIRE - PHQ9
SUM OF ALL RESPONSES TO PHQ QUESTIONS 1-9: 9
5. POOR APPETITE OR OVEREATING: NEARLY EVERY DAY
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
## 2023-12-08 DIAGNOSIS — G43.009 MIGRAINE WITHOUT AURA AND WITHOUT STATUS MIGRAINOSUS, NOT INTRACTABLE: ICD-10-CM

## 2023-12-13 RX ORDER — PSEUDOEPHED/ACETAMINOPH/DIPHEN 30MG-500MG
TABLET ORAL
Qty: 120 TABLET | Refills: 0 | Status: SHIPPED | OUTPATIENT
Start: 2023-12-13 | End: 2024-02-06

## 2023-12-13 NOTE — TELEPHONE ENCOUNTER
12/4/2023  [facility] Internal Medicine [place]     Seb Lafleur MD  Internal Medicin

## 2024-01-14 DIAGNOSIS — I10 BENIGN ESSENTIAL HYPERTENSION: ICD-10-CM

## 2024-01-14 NOTE — PATIENT INSTRUCTIONS
[facility]: [phone]     [facility] Medication Refill Request Information:  * Please contact your pharmacy regarding ANY request for medication refills.  ** [facility] Prescription Fax = [phone]  * Please allow 3 business days for routine medication refills.  * Please allow 5 business days for controlled substance medication refills.     [facility] Test Result notification information:  *You will be notified with in 7-10 days of your appointment day regarding the results of your test.  If you are on MyChart you will be notified as soon as the provider has reviewed the results and signed off on them.     dr. bay 36.4

## 2024-01-17 RX ORDER — LOSARTAN POTASSIUM 100 MG/1
100 TABLET ORAL DAILY
Qty: 90 TABLET | Refills: 3 | Status: SHIPPED | OUTPATIENT
Start: 2024-01-17

## 2024-01-22 ENCOUNTER — PATIENT OUTREACH (OUTPATIENT)
Dept: GASTROENTEROLOGY | Facility: CLINIC | Age: 59
End: 2024-01-22
Payer: COMMERCIAL

## 2024-02-02 DIAGNOSIS — J31.2 CHRONIC SORE THROAT: ICD-10-CM

## 2024-02-02 DIAGNOSIS — G43.009 MIGRAINE WITHOUT AURA AND WITHOUT STATUS MIGRAINOSUS, NOT INTRACTABLE: ICD-10-CM

## 2024-02-06 RX ORDER — FLUTICASONE PROPIONATE 50 MCG
SPRAY, SUSPENSION (ML) NASAL
Qty: 16 G | Refills: 3 | Status: SHIPPED | OUTPATIENT
Start: 2024-02-06

## 2024-02-06 RX ORDER — PSEUDOEPHED/ACETAMINOPH/DIPHEN 30MG-500MG
TABLET ORAL
Qty: 120 TABLET | Refills: 0 | Status: SHIPPED | OUTPATIENT
Start: 2024-02-06 | End: 2024-03-18

## 2024-03-10 DIAGNOSIS — G43.009 MIGRAINE WITHOUT AURA AND WITHOUT STATUS MIGRAINOSUS, NOT INTRACTABLE: ICD-10-CM

## 2024-03-18 RX ORDER — PSEUDOEPHED/ACETAMINOPH/DIPHEN 30MG-500MG
TABLET ORAL
Qty: 120 TABLET | Refills: 5 | Status: SHIPPED | OUTPATIENT
Start: 2024-03-18

## 2024-03-18 NOTE — TELEPHONE ENCOUNTER
Last Clinic Visit: 12/4/2023  [facility] Internal Medicine [place]

## 2024-06-07 ENCOUNTER — TELEPHONE (OUTPATIENT)
Dept: INTERNAL MEDICINE | Facility: CLINIC | Age: 59
End: 2024-06-07
Payer: COMMERCIAL

## 2024-06-07 NOTE — TELEPHONE ENCOUNTER
[facility] Call Center    Phone Message    May a detailed message be left on voicemail: yes     Reason for Call: Other: Patient requesting a call back from the care team to dispute the letter he received stating he missed 2 appts in the last year. Writer mentioned it was nurse visits for Hep B vaccine and he didn't know anything about either appt. Please call him back to discuss and if there's no answer, he asks that you leave a voicemail.

## 2024-06-13 NOTE — TELEPHONE ENCOUNTER
Spoke with patient re: his concerns about the no show letters. Pt states he wasn't aware of the vaccination appts. Appt status updated and pt informed he can disregard the letters that were sent.  Pt states he is getting his vaccines at [facility]. Encouraged patient to share any vaccines with staff during his next appt to ensure that our records are up to date here. Pt verbalizes understanding. Denies any other concerns at this time.

## 2024-07-05 DIAGNOSIS — K51.00 ULCERATIVE PANCOLITIS WITHOUT COMPLICATION (H): ICD-10-CM

## 2024-07-15 ENCOUNTER — MYC MEDICAL ADVICE (OUTPATIENT)
Dept: GASTROENTEROLOGY | Facility: CLINIC | Age: 59
End: 2024-07-15
Payer: COMMERCIAL

## 2024-07-15 DIAGNOSIS — K51.00 ULCERATIVE PANCOLITIS WITHOUT COMPLICATION (H): ICD-10-CM

## 2024-07-15 RX ORDER — MESALAMINE 1.2 G/1
3600 TABLET, DELAYED RELEASE ORAL
Qty: 90 TABLET | Refills: 0 | Status: SHIPPED | OUTPATIENT
Start: 2024-07-15 | End: 2024-08-09

## 2024-07-19 ENCOUNTER — TELEPHONE (OUTPATIENT)
Dept: GASTROENTEROLOGY | Facility: CLINIC | Age: 59
End: 2024-07-19
Payer: COMMERCIAL

## 2024-07-19 NOTE — TELEPHONE ENCOUNTER
Prior Authorization Not Needed per Insurance    Medication: LIALDA 1.2 G PO TBEC  Insurance Company: [employer] - Phone [phone] Fax [phone]  Expected CoPay: $    Pharmacy Filling the Rx:    Pharmacy Notified:    Patient Notified:    NAME BRAND IS COVERED WITH A $0 COPAY    
Prior Authorization Retail Medication Request    Medication/Dose: mesalamine (LIALDA) 1.2 g DR tablet / Take 3 tablets (3,600 mg) by mouth daily (with breakfast)   Diagnosis and ICD code (if different than what is on RX):    New/renewal/insurance change PA/secondary ins. PA:  Previously Tried and Failed:   Rationale:   Ulcerative pancolitis without complication (H) [K51.00]          Insurance   Primary:   Insurance ID:      Secondary (if applicable):  Insurance ID:      Pharmacy Information (if different than what is on RX)  Name:    Phone:    Fax:   
Updated patient via mychart.   
Elizabeth Bravo  (RN)  2019 05:59:07

## 2024-08-09 ENCOUNTER — VIRTUAL VISIT (OUTPATIENT)
Dept: GASTROENTEROLOGY | Facility: CLINIC | Age: 59
End: 2024-08-09
Payer: COMMERCIAL

## 2024-08-09 VITALS — BODY MASS INDEX: 31.01 KG/M2 | WEIGHT: 210 LBS

## 2024-08-09 DIAGNOSIS — K51.00 ULCERATIVE PANCOLITIS WITHOUT COMPLICATION (H): Primary | ICD-10-CM

## 2024-08-09 DIAGNOSIS — K51.00 ULCERATIVE PANCOLITIS (H): ICD-10-CM

## 2024-08-09 PROCEDURE — 99215 OFFICE O/P EST HI 40 MIN: CPT | Mod: 95 | Performed by: DIETITIAN, REGISTERED

## 2024-08-09 RX ORDER — MESALAMINE 4 G/60ML
4 SUSPENSION RECTAL EVERY EVENING
Qty: 30 KIT | Refills: 11 | Status: SHIPPED | OUTPATIENT
Start: 2024-08-09

## 2024-08-09 RX ORDER — MESALAMINE 1.2 G/1
3600 TABLET, DELAYED RELEASE ORAL
Qty: 90 TABLET | Refills: 0 | Status: SHIPPED | OUTPATIENT
Start: 2024-08-09 | End: 2024-08-26

## 2024-08-09 ASSESSMENT — PAIN SCALES - GENERAL: PAINLEVEL: SEVERE PAIN (6)

## 2024-08-09 ASSESSMENT — PATIENT HEALTH QUESTIONNAIRE - PHQ9: SUM OF ALL RESPONSES TO PHQ QUESTIONS 1-9: 11

## 2024-08-09 NOTE — NURSING NOTE
Current patient location: [address] [place] [address]    Is the patient currently in the state of [place]? YES    Visit mode:VIDEO    If the visit is dropped, the patient can be reconnected by: VIDEO VISIT: Text to cell phone:   Telephone Information:   Mobile [phone]       Will anyone else be joining the visit? NO  (If patient encounters technical issues they should call [phone]    How would you like to obtain your AVS? MyChart    Are changes needed to the allergy or medication list? No    Are refills needed on medications prescribed by this physician? NO    Rooming Documentation:  Questionnaire(s) not pre-assigned      Reason for visit: Video Visit (Recheck IBD)    Geraldine STEPHENS

## 2024-08-09 NOTE — PATIENT INSTRUCTIONS
It was a pleasure meeting with you today and discussing your healthcare plan. Below is a summary of what we covered:    -- Get lab work done when you are in town this fall for primary care appointment, lab orders are in  -- Continue Lialda - 3 pills per day  -- Ok to use Rowasa enemas as needed  -- Continue to incorporate fiber in your diet  -- Try fiber gummies to see if you tolerate better than powder, start with 1-2 gummies and slowly work up  -- Drink lots of water    If you have any questions please do not hesitate to reach out via [employer] or call my nurse coordinator, Sonia Donovan, at [phone].      Please see below for any additional questions and scheduling guidelines.    Sign up for [employer]: [employer] patient portal serves as a secure platform for accessing your medical records from the [facility]. Additionally, [employer] facilitates easy, timely, and secure messaging with your care team. If you have not signed up, you may do so by using the provided code or calling [phone].    Coordinating your care after your visit:  There are multiple options for scheduling your follow-up care based on your provider's recommendation.    How do I schedule a follow-up clinic appointment:   After your appointment, you may receive scheduling assistance with the Clinic Coordinators by having a seat in the waiting room and a Clinic Coordinator will call you up to schedule.  Virtual visits or after you leave the clinic:  Your provider has placed a follow-up order in the [employer] portal for scheduling your return appointment. A member of the scheduling team will contact you to schedule.  [employer] Scheduling: Timely scheduling through [employer] is advised to ensure appointment availability.   Call to schedule: You may schedule your follow-up appointment(s) by calling [phone], option 1.    How do I schedule my endoscopy or colonoscopy procedure:  If a procedure, such as a colonoscopy or upper endoscopy was  ordered by your provider, the scheduling team will contact you to schedule this procedure. Or you may choose to call to schedule at   [phone], option 2.  Please allow 20-30 minutes when scheduling a procedure.    How do I get my blood work done? To get your blood work done, you need to schedule a lab appointment at an [facility]. There are multiple ways to schedule:   At the clinic: The Clinic Coordinator you meet after your visit can help you schedule a lab appointment.   [employer] scheduling: [employer] offers online lab scheduling at all [facility] laboratory locations.   Call to schedule: You can call [phone] to schedule your lab appointment.    How do I schedule my imaging study: To schedule imaging studies, such as CT scans, ultrasounds, MRIs, or X-rays, contact Imaging Services at [phone].    How do I schedule a referral to another doctor: If your provider recommended a referral to another specialist(s), the referral order was placed by your provider. You will receive a phone call to schedule this referral, or you may choose to call the number attached to the referral to self-schedule.    For Post-Visit Question(s):  For any inquiries following today's visit:  Please utilize [employer] messaging and allow 48 hours for reply or contact the Call Center during normal business hours at [phone], option 3.  For Emergent After-hours questions, contact the On-Call GI Fellow through the [facility]  at [phone].  In addition, you may contact your Nurse directly using the provided contact information.    Test Results:  Test results will be accessible via [employer] in compliance with the 21st Century Cures Act. This means that your results will be available to you at the same time as your provider. Often you may see your results before your provider does. Results are reviewed by staff within two weeks with communication follow-up. Results may be released in the  patient portal prior to your care team review.    Prescription Refill(s):  Medication prescribed by your provider will be addressed during your visit. For future refills, please coordinate with your pharmacy. If you have not had a recent clinic visit or routine labs, for your safety, your provider may not be able to refill your prescription.

## 2024-08-09 NOTE — PROGRESS NOTES
Virtual Visit Details    Type of service:  Video Visit   Video Start Time:  10:08 AM  Video End Time: 10 39 AM:     Originating Location (pt. Location): Home    Distant Location (provider location):  Off-site  Platform used for Video Visit: Treva      Ulcerative Colitis  Current medication: Lialda 3.6 g daily  Current clinical disease activity: remission  Current endoscopic disease activity: 10/2023 colonoscopy -Esparza 0    Patient is in clinical, endoscopic and histologic remission.  Plan to continue Lialda 3.4 g daily.  Can also use Rowasa enemas as needed, infrequent use of this.  Will plan to recheck labs with creatinine given he is on mesalamine when he is in town for primary care appointment this fall.      -- Continue Lialda 3 point grams daily  -- Rowasa enemas as needed  -- Labs with CBC, CRP, LFTs, Cr this fall, ordered today      2.   IBD dysplasia surveillance:   colonoscopy 10/2023 with 6 mm polyp - path showed polypoid colonic mucosa with low-grade adenomatous dysplasia, recommended 2 year repeat surveillance, will discuss repeat endoscopic assessment with IBD endoscopists    3.  Constipation  Patient with intermittent constipation with harder stools.  Not interested in daily laxative at this point.  He has had some improvement with increasing dietary fiber.  Interested in fiber Gummies as he did not tolerate soluble fiber powder.  Discussed that he could try these, starting at 1-2 Gummies per day and slowly increasing as tolerated.  In the future could trial fiber powder again with slower up titration.  Encouraged good hydration.  Suspect that daily Percocet use likely driving constipation.      Thank you for this consultation.  It was a pleasure to participate in the care of this patient; please contact us with any further questions.  A total of 50minutes was spent on the date of the encounter, 5/30/2023, with this patient in the following care activities: Chart review, patient care and documentation.       This note was created with voice recognition software, and while reviewed for accuracy, typos may remain.         IBD HISTORY  Age at diagnosis:   Extent of endoscopic disease: pan-UC  Extent of histologic disease: pan-UC  Prior UC surgeries: no  Prior IBD Medications:  -- Balsalazide - severe constipation  -- Sulfasalazine -- severe abdominal pain  -- Prednisone at diagnosis otherwise none    HPI:   Today for follow-up.     Last seen over a year ago by Dr. Moses, previously followed with Dr. Grigsby and Dr. Ni.    Continues on Lialda 3.6 mg daily.  He also has Rowasa enemas as needed, uses these primarily when constipated.    Reports overall symptoms have been stable.  Typically has 1-2 soft formed bowel movements per day.  No blood.  Occasionally urgent.  No tenesmus.  No nocturnal stools.  He occasionally has constipation, last was about 4 days ago after he ate a lot of popcorn.  Now back to more regular.  Does not use laxatives on a regular basis.  Has tried fiber but did not tolerate fiber powder.  Trying to increase dietary fiber with beans and high-fiber cereal.  Interested in fiber Gummies.    Some abdominal cramping with eating and when he is constipated.  No nausea or vomiting.  GERD only when he is more constipated.    No rash or skin changes.  No joint symptoms.  No eye symptoms.  No oral ulcers.  No perianal symptoms.      Esparza score:   Stool freq: 0 (baseline stools frequency)  Rectal bleedin (None)  PGA: 0 (normal)  Endoscopy: 0 (normal mucosa)    Remission: <3   Mild disease: 3-5  Moderate disease: 6-10  Severe disease: >10    ROS:  Complete 10 System ROS performed. All are negative except as documented below, in the HPI, or in patient questionnaire from today's visit.  No fevers or chills  No weight loss  No blurry vision, double vision or change in vision  No sore throat  No lymphadenopathy  No headache, paraesthesias, or weakness in a limb  No shortness of breath or wheezing  No  chest pain or pressure  No arthralgias or myalgias  No rashes or skin changes  No odynophagia or dysphagia  No BRBPR, hematochezia, melena  No dysuria, frequency or urgency  No hot/cold intolerance or polyria  No anxiety or depression    Extra intestinal manifestations of IBD:  No uveitis/episcleritis  No aphthous ulcers   No arthritis   No erythema nodosum/pyoderma gangrenosum.       sIBDQ:  IBDQ Score Date IBDQ - Total Score  (Higher score better)   8/8/2024   3:55 PM 56   5/29/2023  12:55 PM 50   10/17/2018   8:43 AM 59          ROS:    Constitutional, HEENT, cardiovascular, pulmonary, GI, , musculoskeletal, neuro, skin, endocrine and psych systems are negative, except as otherwise noted.    PHYSICAL EXAMINATION:  Constitutional: aaox3, cooperative, pleasant, not dyspneic/diaphoretic, no acute distress  Vitals reviewed: Wt 95.3 kg (210 lb)   BMI 31.01 kg/m    Wt:   Wt Readings from Last 2 Encounters:   08/09/24 95.3 kg (210 lb)   12/04/23 103.1 kg (227 lb 6.4 oz)      Eyes: Sclera anicteric/injected  Ears/nose/mouth/throat: Normal oropharynx without ulcers or exudate, mucus membranes moist, hearing intact  Neck: supple, thyroid normal size  CV: No edema  Respiratory: Unlabored breathing  Lymph: No axillary, submandibular, supraclavicular or inguinal lymphadenopathy  Abd:   Nondistended, +bs, no hepatosplenomegaly, nontender, no peritoneal signs  Skin: warm, perfused, no jaundice  Psych: Normal affect  MSK: Normal gait    PERTINENT PAST MEDICAL HISTORY:  Past Medical History:   Diagnosis Date    Adenomatous polyp of colon 10/31/2023    Repeat colonoscopy in 2025    Chronic pain syndrome     Followed by Gokul Negrete MD at a Pain Clinic in [place].    Depression     Episodic tension-type headache, not intractable     Hypertension     Ulcerative colitis (H) 2003       PREVIOUS SURGERIES:  Past Surgical History:   Procedure Laterality Date    COLONOSCOPY N/A 10/26/2023    Adenomatous polyp, repeat in 2025; Procedure:  COLONOSCOPY, WITH POLYPECTOMY AND BIOPSY;  Surgeon: Francisco Moses MD;  Location: [facility]    COLONOSCOPY WITH CO2 INSUFFLATION N/A 11/06/2015    Procedure: COLONOSCOPY WITH CO2 INSUFFLATION;  Surgeon: Francisco Grigsby MD;  Location: [facility]       ALLERGIES:     Allergies   Allergen Reactions    Morphine Sulfate      Severe hallucinations    Balsalazide      Severe constipation      Contrast Dye      PN: LW CM1: CONTRAST- nka Reaction :    Sulfasalazine      Severe abdominal pain    Hydrocodone-Acetaminophen Itching    Iodinated Contrast Media Other (See Comments)     PN: LW CM1: CONTRAST- nka Reaction :       PERTINENT MEDICATIONS:    Current Outpatient Medications:     acetaminophen (TYLENOL) 500 MG tablet, TAKE 1 TO 2 TABLETS BY MOUTH EVERY 6 HOURS AS NEEDED FOR MILD PAIN, Disp: 120 tablet, Rfl: 5    baclofen (LIORESAL) 10 MG tablet, , Disp: , Rfl:     baclofen (LIORESAL) 10 MG tablet, Take 1 tablet (10 mg) by mouth 2 times daily, Disp: 90 tablet, Rfl: 1    benzoyl peroxide 10 % topical liquid, Use daily as directed when showering and be careful about bleaching of towels, Disp: 227 g, Rfl: 3    Camphor-Menthol-Methyl Sal 3.1-6-10 % PTCH, Apply 1 patch topically, Disp: , Rfl:     fluticasone (FLONASE) 50 MCG/ACT nasal spray, INSTILL 1 SPRAY INTO EACH NOSTRIL ONCE DAILY, Disp: 16 g, Rfl: 3    gabapentin (NEURONTIN) 300 MG capsule, Take 1 capsule (300 mg) by mouth At Bedtime, Disp: 30 capsule, Rfl: 1    losartan (COZAAR) 100 MG tablet, TAKE 1 TABLET(100 MG) BY MOUTH DAILY, Disp: 90 tablet, Rfl: 3    losartan (COZAAR) 100 MG tablet, , Disp: , Rfl:     mesalamine (LIALDA) 1.2 g DR tablet, Take 3 tablets (3,600 mg) by mouth daily (with breakfast), Disp: 90 tablet, Rfl: 0    Mesalamine-Cleanser (ROWASA) 4 g KIT, Place 4 g rectally every evening, Disp: 30 kit, Rfl: 11    methylcellulose (CITRUCEL) powder, Take 0.55 g (1.925 teaspoonful) by mouth daily, Disp: 454 g, Rfl: 3    order for DME, Equipment being ordered: Digital home  blood pressure monitor kit, Disp: 1 Units, Rfl: 0    oxyCODONE (OXYCONTIN) 10 MG 12 hr tablet, Take 10 mg by mouth every 12 hours, Disp: , Rfl:     oxyCODONE-acetaminophen (PERCOCET) 5-325 MG per tablet, Take 1 tablet by mouth every 6 hours as needed for moderate to severe pain, Disp: 120 tablet, Rfl: 0    polyethylene glycol (MIRALAX) 17 GM/Dose powder, Take 17 g by mouth 2 times daily For a goal of 1-2 soft bowel movements daily. Take an additional 17g if you are not achieving this., Disp: 510 g, Rfl: 3    tamsulosin (FLOMAX) 0.4 MG capsule, Take 1 capsule (0.4 mg) by mouth daily, Disp: 90 capsule, Rfl: 3    Current Facility-Administered Medications:     lidocaine 1% with EPINEPHrine 1:100,000 injection 3 mL, 3 mL, Intradermal, Once, Julianne Roberts MD    SOCIAL HISTORY:  Social History     Socioeconomic History    Marital status:      Spouse name: Not on file    Number of children: Not on file    Years of education: Not on file    Highest education level: Not on file   Occupational History    Not on file   Tobacco Use    Smoking status: Never    Smokeless tobacco: Never   Substance and Sexual Activity    Alcohol use: No    Drug use: No    Sexual activity: Not on file   Other Topics Concern    Parent/sibling w/ CABG, MI or angioplasty before 65F 55M? Not Asked   Social History Narrative    Lives in [place], 4 children.  Lives with daughter.  On disability.   vetran.  Currently not in a relationship     Social Determinants of Health     Financial Resource Strain: Low Risk  (12/4/2023)    Financial Resource Strain     Within the past 12 months, have you or your family members you live with been unable to get utilities (heat, electricity) when it was really needed?: No   Food Insecurity: Low Risk  (12/4/2023)    Food Insecurity     Within the past 12 months, did you worry that your food would run out before you got money to buy more?: No     Within the past 12 months, did the food you  bought just not last and you didn t have money to get more?: No   Transportation Needs: Low Risk  (12/4/2023)    Transportation Needs     Within the past 12 months, has lack of transportation kept you from medical appointments, getting your medicines, non-medical meetings or appointments, work, or from getting things that you need?: No   Physical Activity: Not on file   Stress: Not on file   Social Connections: Not on file   Interpersonal Safety: Not on file   Housing Stability: Low Risk  (12/4/2023)    Housing Stability     Do you have housing? : Yes     Are you worried about losing your housing?: No       FAMILY HISTORY:  Family History   Problem Relation Age of Onset    Alzheimer Disease Mother     Hypertension Mother     Cerebrovascular Disease Father     Parkinsonism Father     Hypertension Father     Prostate Cancer Father     Depression Sister     Depression Brother     Depression Sister     Breast Cancer Sister     Depression Sister     Skin Cancer No family hx of     Melanoma No family hx of        Past/family/social history reviewed and no changes

## 2024-08-09 NOTE — LETTER
8/9/2024      Parrish Muir  [address] [place] [address]      Dear Colleague,    Thank you for referring your patient, Parrish Muir, to the [facility] [place]. Please see a copy of my visit note below.    Virtual Visit Details    Type of service:  Video Visit   Video Start Time:  10:08 AM  Video End Time: 10 39 AM:     Originating Location (pt. Location): Home    Distant Location (provider location):  Off-site  Platform used for Video Visit: Treva      Ulcerative Colitis  Current medication: Lialda 3.6 g daily  Current clinical disease activity: remission  Current endoscopic disease activity: 10/2023 colonoscopy -Esparza 0    Patient is in clinical, endoscopic and histologic remission.  Plan to continue Lialda 3.4 g daily.  Can also use Rowasa enemas as needed, infrequent use of this.  Will plan to recheck labs with creatinine given he is on mesalamine when he is in town for primary care appointment this fall.      -- Continue Lialda 3 point grams daily  -- Rowasa enemas as needed  -- Labs with CBC, CRP, LFTs, Cr this fall, ordered today      2.   IBD dysplasia surveillance:   colonoscopy 10/2023 with 6 mm polyp - path showed polypoid colonic mucosa with low-grade adenomatous dysplasia, recommended 2 year repeat surveillance, will discuss repeat endoscopic assessment with IBD endoscopists    3.  Constipation  Patient with intermittent constipation with harder stools.  Not interested in daily laxative at this point.  He has had some improvement with increasing dietary fiber.  Interested in fiber Gummies as he did not tolerate soluble fiber powder.  Discussed that he could try these, starting at 1-2 Gummies per day and slowly increasing as tolerated.  In the future could trial fiber powder again with slower up titration.  Encouraged good hydration.  Suspect that daily Percocet use likely driving constipation.      Thank you for this consultation.  It was a  pleasure to participate in the care of this patient; please contact us with any further questions.  A total of 50minutes was spent on the date of the encounter, 2023, with this patient in the following care activities: Chart review, patient care and documentation.      This note was created with voice recognition software, and while reviewed for accuracy, typos may remain.         IBD HISTORY  Age at diagnosis:   Extent of endoscopic disease: pan-UC  Extent of histologic disease: pan-UC  Prior UC surgeries: no  Prior IBD Medications:  -- Balsalazide - severe constipation  -- Sulfasalazine -- severe abdominal pain  -- Prednisone at diagnosis otherwise none    HPI:   Today for follow-up.     Last seen over a year ago by Dr. Moses, previously followed with Dr. Grigsby and Dr. Ni.    Continues on Lialda 3.6 mg daily.  He also has Rowasa enemas as needed, uses these primarily when constipated.    Reports overall symptoms have been stable.  Typically has 1-2 soft formed bowel movements per day.  No blood.  Occasionally urgent.  No tenesmus.  No nocturnal stools.  He occasionally has constipation, last was about 4 days ago after he ate a lot of popcorn.  Now back to more regular.  Does not use laxatives on a regular basis.  Has tried fiber but did not tolerate fiber powder.  Trying to increase dietary fiber with beans and high-fiber cereal.  Interested in fiber Gummies.    Some abdominal cramping with eating and when he is constipated.  No nausea or vomiting.  GERD only when he is more constipated.    No rash or skin changes.  No joint symptoms.  No eye symptoms.  No oral ulcers.  No perianal symptoms.      Esparza score:   Stool freq: 0 (baseline stools frequency)  Rectal bleedin (None)  PGA: 0 (normal)  Endoscopy: 0 (normal mucosa)    Remission: <3   Mild disease: 3-5  Moderate disease: 6-10  Severe disease: >10    ROS:  Complete 10 System ROS performed. All are negative except as documented below, in the  HPI, or in patient questionnaire from today's visit.  No fevers or chills  No weight loss  No blurry vision, double vision or change in vision  No sore throat  No lymphadenopathy  No headache, paraesthesias, or weakness in a limb  No shortness of breath or wheezing  No chest pain or pressure  No arthralgias or myalgias  No rashes or skin changes  No odynophagia or dysphagia  No BRBPR, hematochezia, melena  No dysuria, frequency or urgency  No hot/cold intolerance or polyria  No anxiety or depression    Extra intestinal manifestations of IBD:  No uveitis/episcleritis  No aphthous ulcers   No arthritis   No erythema nodosum/pyoderma gangrenosum.       sIBDQ:  IBDQ Score Date IBDQ - Total Score  (Higher score better)   8/8/2024   3:55 PM 56   5/29/2023  12:55 PM 50   10/17/2018   8:43 AM 59          ROS:    Constitutional, HEENT, cardiovascular, pulmonary, GI, , musculoskeletal, neuro, skin, endocrine and psych systems are negative, except as otherwise noted.    PHYSICAL EXAMINATION:  Constitutional: aaox3, cooperative, pleasant, not dyspneic/diaphoretic, no acute distress  Vitals reviewed: Wt 95.3 kg (210 lb)   BMI 31.01 kg/m    Wt:   Wt Readings from Last 2 Encounters:   08/09/24 95.3 kg (210 lb)   12/04/23 103.1 kg (227 lb 6.4 oz)      Eyes: Sclera anicteric/injected  Ears/nose/mouth/throat: Normal oropharynx without ulcers or exudate, mucus membranes moist, hearing intact  Neck: supple, thyroid normal size  CV: No edema  Respiratory: Unlabored breathing  Lymph: No axillary, submandibular, supraclavicular or inguinal lymphadenopathy  Abd:   Nondistended, +bs, no hepatosplenomegaly, nontender, no peritoneal signs  Skin: warm, perfused, no jaundice  Psych: Normal affect  MSK: Normal gait    PERTINENT PAST MEDICAL HISTORY:  Past Medical History:   Diagnosis Date     Adenomatous polyp of colon 10/31/2023    Repeat colonoscopy in 2025     Chronic pain syndrome     Followed by Gokul Negrete MD at a Pain [facility] in [place].      Depression      Episodic tension-type headache, not intractable      Hypertension      Ulcerative colitis (H) 2003       PREVIOUS SURGERIES:  Past Surgical History:   Procedure Laterality Date     COLONOSCOPY N/A 10/26/2023    Adenomatous polyp, repeat in 2025; Procedure: COLONOSCOPY, WITH POLYPECTOMY AND BIOPSY;  Surgeon: Francisco Moses MD;  Location: [facility]     COLONOSCOPY WITH CO2 INSUFFLATION N/A 11/06/2015    Procedure: COLONOSCOPY WITH CO2 INSUFFLATION;  Surgeon: Francisco Grigsby MD;  Location: [facility] OR       ALLERGIES:     Allergies   Allergen Reactions     Morphine Sulfate      Severe hallucinations     Balsalazide      Severe constipation       Contrast Dye      PN: LW CM1: CONTRAST- nka Reaction :     Sulfasalazine      Severe abdominal pain     Hydrocodone-Acetaminophen Itching     Iodinated Contrast Media Other (See Comments)     PN: LW CM1: CONTRAST- nka Reaction :       PERTINENT MEDICATIONS:    Current Outpatient Medications:      acetaminophen (TYLENOL) 500 MG tablet, TAKE 1 TO 2 TABLETS BY MOUTH EVERY 6 HOURS AS NEEDED FOR MILD PAIN, Disp: 120 tablet, Rfl: 5     baclofen (LIORESAL) 10 MG tablet, , Disp: , Rfl:      baclofen (LIORESAL) 10 MG tablet, Take 1 tablet (10 mg) by mouth 2 times daily, Disp: 90 tablet, Rfl: 1     benzoyl peroxide 10 % topical liquid, Use daily as directed when showering and be careful about bleaching of towels, Disp: 227 g, Rfl: 3     Camphor-Menthol-Methyl Sal 3.1-6-10 % PTCH, Apply 1 patch topically, Disp: , Rfl:      fluticasone (FLONASE) 50 MCG/ACT nasal spray, INSTILL 1 SPRAY INTO EACH NOSTRIL ONCE DAILY, Disp: 16 g, Rfl: 3     gabapentin (NEURONTIN) 300 MG capsule, Take 1 capsule (300 mg) by mouth At Bedtime, Disp: 30 capsule, Rfl: 1     losartan (COZAAR) 100 MG tablet, TAKE 1 TABLET(100 MG) BY MOUTH DAILY, Disp: 90 tablet, Rfl: 3     losartan (COZAAR) 100 MG tablet, , Disp: , Rfl:      mesalamine (LIALDA) 1.2 g DR tablet, Take 3 tablets (3,600 mg) by mouth daily (with  breakfast), Disp: 90 tablet, Rfl: 0     Mesalamine-Cleanser (ROWASA) 4 g KIT, Place 4 g rectally every evening, Disp: 30 kit, Rfl: 11     methylcellulose (CITRUCEL) powder, Take 0.55 g (1.925 teaspoonful) by mouth daily, Disp: 454 g, Rfl: 3     order for DME, Equipment being ordered: Digital home blood pressure monitor kit, Disp: 1 Units, Rfl: 0     oxyCODONE (OXYCONTIN) 10 MG 12 hr tablet, Take 10 mg by mouth every 12 hours, Disp: , Rfl:      oxyCODONE-acetaminophen (PERCOCET) 5-325 MG per tablet, Take 1 tablet by mouth every 6 hours as needed for moderate to severe pain, Disp: 120 tablet, Rfl: 0     polyethylene glycol (MIRALAX) 17 GM/Dose powder, Take 17 g by mouth 2 times daily For a goal of 1-2 soft bowel movements daily. Take an additional 17g if you are not achieving this., Disp: 510 g, Rfl: 3     tamsulosin (FLOMAX) 0.4 MG capsule, Take 1 capsule (0.4 mg) by mouth daily, Disp: 90 capsule, Rfl: 3    Current Facility-Administered Medications:      lidocaine 1% with EPINEPHrine 1:100,000 injection 3 mL, 3 mL, Intradermal, Once, Julianne Roberts MD    SOCIAL HISTORY:  Social History     Socioeconomic History     Marital status:      Spouse name: Not on file     Number of children: Not on file     Years of education: Not on file     Highest education level: Not on file   Occupational History     Not on file   Tobacco Use     Smoking status: Never     Smokeless tobacco: Never   Substance and Sexual Activity     Alcohol use: No     Drug use: No     Sexual activity: Not on file   Other Topics Concern     Parent/sibling w/ CABG, MI or angioplasty before 65F 55M? Not Asked   Social History Narrative    Lives in [place], 4 children.  Lives with daughter.  On disability.   vetran.  Currently not in a relationship     Social Determinants of Health     Financial Resource Strain: Low Risk  (12/4/2023)    Financial Resource Strain      Within the past 12 months, have you or your family members  you live with been unable to get utilities (heat, electricity) when it was really needed?: No   Food Insecurity: Low Risk  (12/4/2023)    Food Insecurity      Within the past 12 months, did you worry that your food would run out before you got money to buy more?: No      Within the past 12 months, did the food you bought just not last and you didn t have money to get more?: No   Transportation Needs: Low Risk  (12/4/2023)    Transportation Needs      Within the past 12 months, has lack of transportation kept you from medical appointments, getting your medicines, non-medical meetings or appointments, work, or from getting things that you need?: No   Physical Activity: Not on file   Stress: Not on file   Social Connections: Not on file   Interpersonal Safety: Not on file   Housing Stability: Low Risk  (12/4/2023)    Housing Stability      Do you have housing? : Yes      Are you worried about losing your housing?: No       FAMILY HISTORY:  Family History   Problem Relation Age of Onset     Alzheimer Disease Mother      Hypertension Mother      Cerebrovascular Disease Father      Parkinsonism Father      Hypertension Father      Prostate Cancer Father      Depression Sister      Depression Brother      Depression Sister      Breast Cancer Sister      Depression Sister      Skin Cancer No family hx of      Melanoma No family hx of        Past/family/social history reviewed and no changes      Again, thank you for allowing me to participate in the care of your patient.        Sincerely,        Sola Cameron PA-C

## 2024-08-13 ENCOUNTER — TELEPHONE (OUTPATIENT)
Dept: GASTROENTEROLOGY | Facility: CLINIC | Age: 59
End: 2024-08-13
Payer: COMMERCIAL

## 2024-08-13 NOTE — TELEPHONE ENCOUNTER
Left Voicemail (1st Attempt) for the patient to call back and schedule the following:    Appointment type: New   Provider: Dr. Warner  Return date: November 2024  Specialty phone number: [phone]  Additional appointment(s) needed:   Additonal Notes:         *Can you contact this patient and offer an appointment with Dr. Warner maybe like November timeframe?     You can use a new slot, as he is new to Dr. Warner.     Let me know,   Oh

## 2024-08-15 ENCOUNTER — TELEPHONE (OUTPATIENT)
Dept: GASTROENTEROLOGY | Facility: CLINIC | Age: 59
End: 2024-08-15
Payer: COMMERCIAL

## 2024-08-15 NOTE — TELEPHONE ENCOUNTER
Left Voicemail (2nd  Attempt) for the patient to call back and schedule the following:     Appointment type: return   Provider: Dr. Warner  Return date: next available  Specialty phone number: [phone]   Additional appointment(s) needed:   Additonal Notes:          *Can you contact this patient and offer an appointment with Dr. Warner maybe like November timeframe?     You can use a new slot, as he is new to Dr. Warner.

## 2024-08-23 ENCOUNTER — TELEPHONE (OUTPATIENT)
Dept: GASTROENTEROLOGY | Facility: CLINIC | Age: 59
End: 2024-08-23
Payer: COMMERCIAL

## 2024-08-23 DIAGNOSIS — K51.00 ULCERATIVE PANCOLITIS WITHOUT COMPLICATION (H): ICD-10-CM

## 2024-08-23 NOTE — TELEPHONE ENCOUNTER
[facility] Call Center    Phone Message    May a detailed message be left on voicemail: yes     Reason for Call: Other: Patient confused on what next steps are and when he needs F/U. Please call patient to discuss.     Action Taken: Message routed to:  [facility] GI    Travel Screening: Not Applicable     Date of Service:

## 2024-08-26 RX ORDER — MESALAMINE 1.2 G/1
3600 TABLET, DELAYED RELEASE ORAL
Qty: 270 TABLET | Refills: 1 | Status: SHIPPED | OUTPATIENT
Start: 2024-08-26

## 2024-08-26 NOTE — TELEPHONE ENCOUNTER
Contacted patient to discuss next steps.     Patient confirmed upcoming appointment with Dr. Warner in November, per Sola Cameron's recommendation. [employer] message sent to clinic coordinators requesting appointment be scheduled.     Patient also requested refill of Lialda. Per Sola Cameron - continue Lialda 3 tablets daily. Refill sent to preferred pharmacy.

## 2024-10-03 RX ORDER — MESALAMINE 1.2 G/1
TABLET, DELAYED RELEASE ORAL
Qty: 90 TABLET | Refills: 11 | OUTPATIENT
Start: 2024-10-03

## 2024-11-13 ENCOUNTER — VIRTUAL VISIT (OUTPATIENT)
Dept: GASTROENTEROLOGY | Facility: CLINIC | Age: 59
End: 2024-11-13
Payer: COMMERCIAL

## 2024-11-13 VITALS — HEIGHT: 69 IN | BODY MASS INDEX: 32.58 KG/M2 | WEIGHT: 220 LBS

## 2024-11-13 DIAGNOSIS — K51.00 ULCERATIVE PANCOLITIS WITHOUT COMPLICATION (H): ICD-10-CM

## 2024-11-13 RX ORDER — MESALAMINE 4 G/60ML
4 SUSPENSION RECTAL EVERY EVENING
Qty: 30 KIT | Refills: 11 | Status: SHIPPED | OUTPATIENT
Start: 2024-11-13

## 2024-11-13 RX ORDER — MESALAMINE 1.2 G/1
3600 TABLET, DELAYED RELEASE ORAL
Qty: 270 TABLET | Refills: 2 | Status: SHIPPED | OUTPATIENT
Start: 2024-11-13

## 2024-11-13 ASSESSMENT — PAIN SCALES - GENERAL: PAINLEVEL_OUTOF10: MODERATE PAIN (5)

## 2024-11-13 NOTE — PATIENT INSTRUCTIONS
It was a pleasure meeting with you today and discussing your healthcare plan. Below is a summary of what we covered:    Keep up the good work    Take the Lialda 3 pills daily (refills given)    Take the Rowasa enemas as needed (refills given)    Take fiber daily    Follow up with Sola in 6 months and Dr. Warner 12 months      Please see below for any additional questions and scheduling guidelines.    Sign up for [facility]: [facility] patient portal serves as a secure platform for accessing your medical records from the [facility]. Additionally, [facility] facilitates easy, timely, and secure messaging with your care team. If you have not signed up, you may do so by using the provided code or calling [phone].    Coordinating your care after your visit:  There are multiple options for scheduling your follow-up care based on your provider's recommendation.    How do I schedule a follow-up clinic appointment:   After your appointment, you may receive scheduling assistance with the Clinic Coordinators by having a seat in the waiting room and a Clinic Coordinator will call you up to schedule.  Virtual visits or after you leave the clinic:  Your provider has placed a follow-up order in the [facility] portal for scheduling your return appointment. A member of the scheduling team will contact you to schedule.  [facility] Scheduling: Timely scheduling through [facility] is advised to ensure appointment availability.   Call to schedule: You may schedule your follow-up appointment(s) by calling [phone], option 1.    How do I schedule my endoscopy or colonoscopy procedure:  If a procedure, such as a colonoscopy or upper endoscopy was ordered by your provider, the scheduling team will contact you to schedule this procedure. Or you may choose to call to schedule at   [phone], option 2.  Please allow 20-30 minutes when scheduling a procedure.    How do I get my blood work done? To get your blood work done, you need to  schedule a lab appointment at an [facility]. There are multiple ways to schedule:   At the clinic: The Clinic Coordinator you meet after your visit can help you schedule a lab appointment.   [facility] scheduling: [facility] offers online lab scheduling at all [facility] laboratory locations.   Call to schedule: You can call [phone] to schedule your lab appointment.    How do I schedule my imaging study: To schedule imaging studies, such as CT scans, ultrasounds, MRIs, or X-rays, contact Imaging Services at [phone].    How do I schedule a referral to another doctor: If your provider recommended a referral to another specialist(s), the referral order was placed by your provider. You will receive a phone call to schedule this referral, or you may choose to call the number attached to the referral to self-schedule.    For Post-Visit Question(s):  For any inquiries following today's visit:  Please utilize [facility] messaging and allow 48 hours for reply or contact the Call Center during normal business hours at [phone], option 3.  For Emergent After-hours questions, contact the On-Call GI Fellow through the [facility]  at [phone].  In addition, you may contact your Nurse directly using the provided contact information.    Test Results:  Test results will be accessible via [facility] in compliance with the 21st Century Cures Act. This means that your results will be available to you at the same time as your provider. Often you may see your results before your provider does. Results are reviewed by staff within two weeks with communication follow-up. Results may be released in the patient portal prior to your care team review.    Prescription Refill(s):  Medication prescribed by your provider will be addressed during your visit. For future refills, please coordinate with your pharmacy. If you have not had a recent clinic visit or routine labs, for your safety, your  provider may not be able to refill your prescription.

## 2024-11-13 NOTE — NURSING NOTE
Current patient location: [address] [place] [address]    Is the patient currently in the state of [place]? YES    Visit mode:VIDEO    If the visit is dropped, the patient can be reconnected by:VIDEO VISIT: Text to cell phone:   Telephone Information:   Mobile [phone]       Will anyone else be joining the visit? NO  (If patient encounters technical issues they should call [phone])    Are changes needed to the allergy or medication list? No    Are refills needed on medications prescribed by this physician? NO    Rooming Documentation:  Questionnaire(s) completed    Reason for visit: JONATHAN STEPHENS

## 2024-11-13 NOTE — PROGRESS NOTES
Virtual Visit Details    Type of service:  Video Visit   Video Start Time: 9:28 AM  Video End Time: 9:43 AM    Originating Location (pt. Location): Home    Distant Location (provider location):  On-site  Platform used for Video Visit: [facility]      IBD CLINIC VISIT     CC/REFERRING MD:  Referred Self  REASON FOR CONSULTATION: follow up UC    ASSESSMENT/PLAN    1. Ulcerative colitis:   Current medications: Lialda 3.6 grams daily and Rowasa PRN  Current clinical disease activity: remission (p Esparza 0)  Last endoscopic disease activity: Colonoscopy 10/2023 Esparza 0    Continues to do well in clinical and histology remission    -Lialda 3.6 grams daily  -Rowasa enemas as needed  -Check labs CBC, LFTs, BMP, CRP when he gets his annual physical this year    2. IBD dysplasia surveillance:   due 10/2025. He had a sporadic adenoma in sigmoid. Removed with cold snare. This was just an adenoma. Repeat colonoscopy for surveillance in 2 years (2025)    3. Constipation - managed with fiber. He does not want other laxatives. Discussed fiber powder and if not tolerated can then trial gummies.    4. GERD - mild and driven mainly if constipation worse. Follow over the next few months as symptoms quite minimal. If worsens can add EGD to colonoscopy needed in fall of 2025    Return to clinic in 6 months with Sola Cameron and 12 months with Dr. Warner    Thank you for this consultation.  It was a pleasure to participate in the care of this patient; please contact us with any further questions.  I spent a total of 40 minutes during the day of encounter performed chart review, meeting with patient, patient counseling, care coordination, and documentation.      This note was created with voice recognition software, and while reviewed for accuracy, typos may remain.     Jason Warner MD  Inflammatory Bowel Disease Program   Division of Gastroenterology, Hepatology and Nutrition  [facility]        IBD HISTORY  Age at  "diagnosis:   Extent of endoscopic disease: pan-UC  Extent of histologic disease: pan-UC  Prior UC surgeries: no  Prior IBD Medications:  -- Balsalazide - severe constipation  -- Sulfasalazine -- severe abdominal pain  -- Prednisone at diagnosis otherwise none    DRUG MONITORING  TPMT enzyme activity:  NA    6-TGN/6-MMPN levels: NA    Biologic concentration: NA         DISEASE ASSESSMENT    Endoscopic assessment: mucosal healing 10/2023 - small tubular adenoma sigmoid  Enterography: none  Fecal calprotectin: none  C diff: none    HPI:   Currently, here for routine follow up.    Doing well. Tolerating meds (Lialda) without issue. Uses Rowasa PRN.    No diarrhea and no blood.    Tends towards constipation - better if uses fiber.    Esparza score:   Stool freq: 0 (baseline stools frequency)  Rectal bleedin (None)  PGA: 0 (normal)  Endoscopy: Not done    Extra intestinal manifestations of IBD:  No uveitis/episcleritis  No aphthous ulcers   No arthritis   No erythema nodosum/pyoderma gangrenosum.     sIBDQ:  IBDQ Score Date IBDQ - Total Score  (Higher score better)   2024   3:55 PM 56   2023  12:55 PM 50   10/17/2018   8:43 AM 58          ROS:    Constitutional, HEENT, cardiovascular, pulmonary, GI, , musculoskeletal, neuro, skin, endocrine and psych systems are negative, except as otherwise noted.    PHYSICAL EXAMINATION:  Constitutional: aaox3, cooperative, pleasant, not dyspneic/diaphoretic, no acute distress  Vitals reviewed: Ht 1.753 m (5' 9\")   Wt 99.8 kg (220 lb)   BMI 32.49 kg/m    Wt:   Wt Readings from Last 2 Encounters:   24 99.8 kg (220 lb)   24 95.3 kg (210 lb)      Eyes: Sclera anicteric/injected  Ears/nose/mouth/throat: Normal oropharynx without ulcers or exudate, mucus membranes moist, hearing intact  Neck: supple, thyroid normal size  CV: No edema  Respiratory: Unlabored breathing  Lymph: No axillary, submandibular, supraclavicular or inguinal lymphadenopathy  Abd:  " Nondistended, +bs, no hepatosplenomegaly, nontender, no peritoneal signs  Skin: warm, perfused, no jaundice  Psych: Normal affect  MSK: Normal gait    PERTINENT PAST MEDICAL HISTORY:  Past Medical History:   Diagnosis Date    Adenomatous polyp of colon 10/31/2023    Repeat colonoscopy in 2025    Chronic pain syndrome     Followed by Gokul Negrete MD at a Pain Clinic in [place].    Depression     Episodic tension-type headache, not intractable     Hypertension     Ulcerative colitis (H) 2003       PREVIOUS SURGERIES:  Past Surgical History:   Procedure Laterality Date    COLONOSCOPY N/A 10/26/2023    Adenomatous polyp, repeat in 2025; Procedure: COLONOSCOPY, WITH POLYPECTOMY AND BIOPSY;  Surgeon: Francisco Moses MD;  [facility]    COLONOSCOPY WITH CO2 INSUFFLATION N/A 11/06/2015    Procedure: COLONOSCOPY WITH CO2 INSUFFLATION;  Surgeon: Francisco Grigsby MD;  [facility]       ALLERGIES:     Allergies   Allergen Reactions    Morphine Sulfate      Severe hallucinations    Balsalazide      Severe constipation      Contrast Dye      PN: LW CM1: CONTRAST- nka Reaction :    Sulfasalazine      Severe abdominal pain    Hydrocodone-Acetaminophen Itching    Iodinated Contrast Media Other (See Comments)     PN: LW CM1: CONTRAST- nka Reaction :       PERTINENT MEDICATIONS:    Current Outpatient Medications:     acetaminophen (TYLENOL) 500 MG tablet, TAKE 1 TO 2 TABLETS BY MOUTH EVERY 6 HOURS AS NEEDED FOR MILD PAIN, Disp: 120 tablet, Rfl: 5    baclofen (LIORESAL) 10 MG tablet, , Disp: , Rfl:     baclofen (LIORESAL) 10 MG tablet, Take 1 tablet (10 mg) by mouth 2 times daily, Disp: 90 tablet, Rfl: 1    benzoyl peroxide 10 % topical liquid, Use daily as directed when showering and be careful about bleaching of towels, Disp: 227 g, Rfl: 3    Camphor-Menthol-Methyl Sal 3.1-6-10 % PTCH, Apply 1 patch topically, Disp: , Rfl:     fluticasone (FLONASE) 50 MCG/ACT nasal spray, INSTILL 1 SPRAY INTO EACH NOSTRIL ONCE DAILY, Disp: 16 g,  Rfl: 3    gabapentin (NEURONTIN) 300 MG capsule, Take 1 capsule (300 mg) by mouth At Bedtime, Disp: 30 capsule, Rfl: 1    losartan (COZAAR) 100 MG tablet, TAKE 1 TABLET(100 MG) BY MOUTH DAILY, Disp: 90 tablet, Rfl: 3    losartan (COZAAR) 100 MG tablet, , Disp: , Rfl:     mesalamine (LIALDA) 1.2 g DR tablet, Take 3 tablets (3,600 mg) by mouth daily (with breakfast)., Disp: 270 tablet, Rfl: 1    Mesalamine-Cleanser (ROWASA) 4 g KIT, Place 4 g rectally every evening, Disp: 30 kit, Rfl: 11    methylcellulose (CITRUCEL) powder, Take 0.55 g (1.925 teaspoonful) by mouth daily, Disp: 454 g, Rfl: 3    order for DME, Equipment being ordered: Digital home blood pressure monitor kit, Disp: 1 Units, Rfl: 0    oxyCODONE (OXYCONTIN) 10 MG 12 hr tablet, Take 10 mg by mouth every 12 hours, Disp: , Rfl:     oxyCODONE-acetaminophen (PERCOCET) 5-325 MG per tablet, Take 1 tablet by mouth every 6 hours as needed for moderate to severe pain, Disp: 120 tablet, Rfl: 0    polyethylene glycol (MIRALAX) 17 GM/Dose powder, Take 17 g by mouth 2 times daily For a goal of 1-2 soft bowel movements daily. Take an additional 17g if you are not achieving this., Disp: 510 g, Rfl: 3    tamsulosin (FLOMAX) 0.4 MG capsule, Take 1 capsule (0.4 mg) by mouth daily, Disp: 90 capsule, Rfl: 3    Current Facility-Administered Medications:     lidocaine 1% with EPINEPHrine 1:100,000 injection 3 mL, 3 mL, Intradermal, Once, Julianne Roberts MD    SOCIAL HISTORY:  Social History     Socioeconomic History    Marital status:      Spouse name: Not on file    Number of children: Not on file    Years of education: Not on file    Highest education level: Not on file   Occupational History    Not on file   Tobacco Use    Smoking status: Never    Smokeless tobacco: Never   Substance and Sexual Activity    Alcohol use: No    Drug use: No    Sexual activity: Not on file   Other Topics Concern    Parent/sibling w/ CABG, MI or angioplasty before 65F 55M?  Not Asked   Social History Narrative    Lives in [place], 4 children.  Lives with daughter.  On disability.   vetran.  Currently not in a relationship     Social Drivers of Health     Financial Resource Strain: Low Risk  (12/4/2023)    Financial Resource Strain     Within the past 12 months, have you or your family members you live with been unable to get utilities (heat, electricity) when it was really needed?: No   Food Insecurity: Low Risk  (12/4/2023)    Food Insecurity     Within the past 12 months, did you worry that your food would run out before you got money to buy more?: No     Within the past 12 months, did the food you bought just not last and you didn t have money to get more?: No   Transportation Needs: Low Risk  (12/4/2023)    Transportation Needs     Within the past 12 months, has lack of transportation kept you from medical appointments, getting your medicines, non-medical meetings or appointments, work, or from getting things that you need?: No   Physical Activity: Not on file   Stress: Not on file   Social Connections: Not on file   Interpersonal Safety: Not on file   Housing Stability: Low Risk  (12/4/2023)    Housing Stability     Do you have housing? : Yes     Are you worried about losing your housing?: No       FAMILY HISTORY:  Family History   Problem Relation Age of Onset    Alzheimer Disease Mother     Hypertension Mother     Cerebrovascular Disease Father     Parkinsonism Father     Hypertension Father     Prostate Cancer Father     Depression Sister     Depression Brother     Depression Sister     Breast Cancer Sister     Depression Sister     Skin Cancer No family hx of     Melanoma No family hx of        Past/family/social history reviewed and no changes

## 2024-11-13 NOTE — LETTER
11/13/2024      Parrish Muir  [address] [place] [address]      Dear Colleague,    Thank you for referring your patient, Parrish Muir, to the [facility] [place]. Please see a copy of my visit note below.    Virtual Visit Details    Type of service:  Video Visit   Video Start Time: 9:28 AM  Video End Time: 9:43 AM    Originating Location (pt. Location): Home    Distant Location (provider location):  On-site  Platform used for Video Visit: [facility]      IBD CLINIC VISIT     CC/REFERRING MD:  Referred Self  REASON FOR CONSULTATION: follow up UC    ASSESSMENT/PLAN    1. Ulcerative colitis:   Current medications: Lialda 3.6 grams daily and Rowasa PRN  Current clinical disease activity: remission (p Esparza 0)  Last endoscopic disease activity: Colonoscopy 10/2023 Esparza 0    Continues to do well in clinical and histology remission    -Lialda 3.6 grams daily  -Rowasa enemas as needed  -Check labs CBC, LFTs, BMP, CRP when he gets his annual physical this year    2. IBD dysplasia surveillance:   due 10/2025. He had a sporadic adenoma in sigmoid. Removed with cold snare. This was just an adenoma. Repeat colonoscopy for surveillance in 2 years (2025)    3. Constipation - managed with fiber. He does not want other laxatives. Discussed fiber powder and if not tolerated can then trial gummies.    4. GERD - mild and driven mainly if constipation worse. Follow over the next few months as symptoms quite minimal. If worsens can add EGD to colonoscopy needed in fall of 2025    Return to clinic in 6 months with Sola Cameron and 12 months with Dr. Warner    Thank you for this consultation.  It was a pleasure to participate in the care of this patient; please contact us with any further questions.  I spent a total of 40 minutes during the day of encounter performed chart review, meeting with patient, patient counseling, care coordination, and documentation.      This note was created  "with voice recognition software, and while reviewed for accuracy, typos may remain.     Jason Warner MD  Inflammatory Bowel Disease Program   Division of Gastroenterology, Hepatology and Nutrition  [facility]        IBD HISTORY  Age at diagnosis:   Extent of endoscopic disease: pan-UC  Extent of histologic disease: pan-UC  Prior UC surgeries: no  Prior IBD Medications:  -- Balsalazide - severe constipation  -- Sulfasalazine -- severe abdominal pain  -- Prednisone at diagnosis otherwise none    DRUG MONITORING  TPMT enzyme activity:  NA    6-TGN/6-MMPN levels: NA    Biologic concentration: NA         DISEASE ASSESSMENT    Endoscopic assessment: mucosal healing 10/2023 - small tubular adenoma sigmoid  Enterography: none  Fecal calprotectin: none  C diff: none    HPI:   Currently, here for routine follow up.    Doing well. Tolerating meds (Lialda) without issue. Uses Rowasa PRN.    No diarrhea and no blood.    Tends towards constipation - better if uses fiber.    Esparza score:   Stool freq: 0 (baseline stools frequency)  Rectal bleedin (None)  PGA: 0 (normal)  Endoscopy: Not done    Extra intestinal manifestations of IBD:  No uveitis/episcleritis  No aphthous ulcers   No arthritis   No erythema nodosum/pyoderma gangrenosum.     sIBDQ:  IBDQ Score Date IBDQ - Total Score  (Higher score better)   2024   3:55 PM 56   2023  12:55 PM 50   10/17/2018   8:43 AM 58          ROS:    Constitutional, HEENT, cardiovascular, pulmonary, GI, , musculoskeletal, neuro, skin, endocrine and psych systems are negative, except as otherwise noted.    PHYSICAL EXAMINATION:  Constitutional: aaox3, cooperative, pleasant, not dyspneic/diaphoretic, no acute distress  Vitals reviewed: Ht 1.753 m (5' 9\")   Wt 99.8 kg (220 lb)   BMI 32.49 kg/m    Wt:   Wt Readings from Last 2 Encounters:   24 99.8 kg (220 lb)   24 95.3 kg (210 lb)      Eyes: Sclera anicteric/injected  Ears/nose/mouth/throat: " Normal oropharynx without ulcers or exudate, mucus membranes moist, hearing intact  Neck: supple, thyroid normal size  CV: No edema  Respiratory: Unlabored breathing  Lymph: No axillary, submandibular, supraclavicular or inguinal lymphadenopathy  Abd:  Nondistended, +bs, no hepatosplenomegaly, nontender, no peritoneal signs  Skin: warm, perfused, no jaundice  Psych: Normal affect  MSK: Normal gait    PERTINENT PAST MEDICAL HISTORY:  Past Medical History:   Diagnosis Date     Adenomatous polyp of colon 10/31/2023    Repeat colonoscopy in 2025     Chronic pain syndrome     Followed by Gokul Negrete MD at a Pain Clinic in [place].     Depression      Episodic tension-type headache, not intractable      Hypertension      Ulcerative colitis (H) 2003       PREVIOUS SURGERIES:  Past Surgical History:   Procedure Laterality Date     COLONOSCOPY N/A 10/26/2023    Adenomatous polyp, repeat in 2025; Procedure: COLONOSCOPY, WITH POLYPECTOMY AND BIOPSY;  Surgeon: Francisco Moses MD;  [facility]     COLONOSCOPY WITH CO2 INSUFFLATION N/A 11/06/2015    Procedure: COLONOSCOPY WITH CO2 INSUFFLATION;  Surgeon: Francisco Grigsby MD;  [facility]       ALLERGIES:     Allergies   Allergen Reactions     Morphine Sulfate      Severe hallucinations     Balsalazide      Severe constipation       Contrast Dye      PN: LW CM1: CONTRAST- nka Reaction :     Sulfasalazine      Severe abdominal pain     Hydrocodone-Acetaminophen Itching     Iodinated Contrast Media Other (See Comments)     PN: LW CM1: CONTRAST- nka Reaction :       PERTINENT MEDICATIONS:    Current Outpatient Medications:      acetaminophen (TYLENOL) 500 MG tablet, TAKE 1 TO 2 TABLETS BY MOUTH EVERY 6 HOURS AS NEEDED FOR MILD PAIN, Disp: 120 tablet, Rfl: 5     baclofen (LIORESAL) 10 MG tablet, , Disp: , Rfl:      baclofen (LIORESAL) 10 MG tablet, Take 1 tablet (10 mg) by mouth 2 times daily, Disp: 90 tablet, Rfl: 1     benzoyl peroxide 10 % topical liquid, Use daily as directed  when showering and be careful about bleaching of towels, Disp: 227 g, Rfl: 3     Camphor-Menthol-Methyl Sal 3.1-6-10 % PTCH, Apply 1 patch topically, Disp: , Rfl:      fluticasone (FLONASE) 50 MCG/ACT nasal spray, INSTILL 1 SPRAY INTO EACH NOSTRIL ONCE DAILY, Disp: 16 g, Rfl: 3     gabapentin (NEURONTIN) 300 MG capsule, Take 1 capsule (300 mg) by mouth At Bedtime, Disp: 30 capsule, Rfl: 1     losartan (COZAAR) 100 MG tablet, TAKE 1 TABLET(100 MG) BY MOUTH DAILY, Disp: 90 tablet, Rfl: 3     losartan (COZAAR) 100 MG tablet, , Disp: , Rfl:      mesalamine (LIALDA) 1.2 g DR tablet, Take 3 tablets (3,600 mg) by mouth daily (with breakfast)., Disp: 270 tablet, Rfl: 1     Mesalamine-Cleanser (ROWASA) 4 g KIT, Place 4 g rectally every evening, Disp: 30 kit, Rfl: 11     methylcellulose (CITRUCEL) powder, Take 0.55 g (1.925 teaspoonful) by mouth daily, Disp: 454 g, Rfl: 3     order for DME, Equipment being ordered: Digital home blood pressure monitor kit, Disp: 1 Units, Rfl: 0     oxyCODONE (OXYCONTIN) 10 MG 12 hr tablet, Take 10 mg by mouth every 12 hours, Disp: , Rfl:      oxyCODONE-acetaminophen (PERCOCET) 5-325 MG per tablet, Take 1 tablet by mouth every 6 hours as needed for moderate to severe pain, Disp: 120 tablet, Rfl: 0     polyethylene glycol (MIRALAX) 17 GM/Dose powder, Take 17 g by mouth 2 times daily For a goal of 1-2 soft bowel movements daily. Take an additional 17g if you are not achieving this., Disp: 510 g, Rfl: 3     tamsulosin (FLOMAX) 0.4 MG capsule, Take 1 capsule (0.4 mg) by mouth daily, Disp: 90 capsule, Rfl: 3    Current Facility-Administered Medications:      lidocaine 1% with EPINEPHrine 1:100,000 injection 3 mL, 3 mL, Intradermal, Once, Julianne Roberts MD    SOCIAL HISTORY:  Social History     Socioeconomic History     Marital status:      Spouse name: Not on file     Number of children: Not on file     Years of education: Not on file     Highest education level: Not on file    Occupational History     Not on file   Tobacco Use     Smoking status: Never     Smokeless tobacco: Never   Substance and Sexual Activity     Alcohol use: No     Drug use: No     Sexual activity: Not on file   Other Topics Concern     Parent/sibling w/ CABG, MI or angioplasty before 65F 55M? Not Asked   Social History Narrative    Lives in [place], 4 children.  Lives with daughter.  On disability.   vetran.  Currently not in a relationship     Social Drivers of Health     Financial Resource Strain: Low Risk  (12/4/2023)    Financial Resource Strain      Within the past 12 months, have you or your family members you live with been unable to get utilities (heat, electricity) when it was really needed?: No   Food Insecurity: Low Risk  (12/4/2023)    Food Insecurity      Within the past 12 months, did you worry that your food would run out before you got money to buy more?: No      Within the past 12 months, did the food you bought just not last and you didn t have money to get more?: No   Transportation Needs: Low Risk  (12/4/2023)    Transportation Needs      Within the past 12 months, has lack of transportation kept you from medical appointments, getting your medicines, non-medical meetings or appointments, work, or from getting things that you need?: No   Physical Activity: Not on file   Stress: Not on file   Social Connections: Not on file   Interpersonal Safety: Not on file   Housing Stability: Low Risk  (12/4/2023)    Housing Stability      Do you have housing? : Yes      Are you worried about losing your housing?: No       FAMILY HISTORY:  Family History   Problem Relation Age of Onset     Alzheimer Disease Mother      Hypertension Mother      Cerebrovascular Disease Father      Parkinsonism Father      Hypertension Father      Prostate Cancer Father      Depression Sister      Depression Brother      Depression Sister      Breast Cancer Sister      Depression Sister      Skin Cancer No family hx of       Melanoma No family hx of        Past/family/social history reviewed and no changes        Again, thank you for allowing me to participate in the care of your patient.        Sincerely,        Jason Warner MD

## 2024-11-25 ENCOUNTER — TELEPHONE (OUTPATIENT)
Dept: GASTROENTEROLOGY | Facility: CLINIC | Age: 59
End: 2024-11-25
Payer: COMMERCIAL

## 2024-11-25 NOTE — TELEPHONE ENCOUNTER
Left Voicemail (1st Attempt) and Sent Mychart (1st Attempt) for the patient to call back and schedule the following:    Appointment type: Return IBD  Provider: Soal Cameron/Dr. Warner  Return date:   -6 mo with Sola Cameron (around May 2025)  -1 year with Dr. Warner (around Nov 2025)  Specialty phone number: [phone]  Additional appointment(s) needed: procedure referral  Additonal Notes: LVM/Torin x1

## 2025-01-25 ENCOUNTER — HEALTH MAINTENANCE LETTER (OUTPATIENT)
Age: 60
End: 2025-01-25

## 2025-03-16 DIAGNOSIS — I10 BENIGN ESSENTIAL HYPERTENSION: ICD-10-CM

## 2025-03-17 RX ORDER — LOSARTAN POTASSIUM 100 MG/1
100 TABLET ORAL DAILY
Qty: 90 TABLET | Refills: 0 | Status: SHIPPED | OUTPATIENT
Start: 2025-03-17

## 2025-03-17 NOTE — TELEPHONE ENCOUNTER
Patient confirmed scheduled appointment:  Date: 3/27  Time: 10am  Visit type: Return   Provider: Milka Dukes   [place] [facility] [place]

## 2025-03-17 NOTE — TELEPHONE ENCOUNTER
Last Written Prescription:  losartan (COZAAR) 100 MG tablet 90 tablet 3 1/17/2024 -- No   Sig - Route: TAKE 1 TABLET(100 MG) BY MOUTH DAILY - Oral     ----------------------  Last Visit Date: 12-4-23  Future Visit Date: none  ----------------------      Refill decision: Medication refilled per  Medication Refill in Ambulatory Care  policy.    Refill decision: Medication unable to be refilled by RN due to:   Overdue labs/test:  K, GFR and BP documentation  Past due appt, LCV >15 M        Request from pharmacy:  Requested Prescriptions   Pending Prescriptions Disp Refills    losartan (COZAAR) 100 MG tablet 90 tablet 3     Sig: Take 1 tablet (100 mg) by mouth daily.       Angiotensin-II Receptors Failed - 3/17/2025  8:06 AM        Failed - Most recent blood pressure under 140/90 in past 12 months     BP Readings from Last 3 Encounters:   12/04/23 136/89   10/26/23 117/76   08/21/23 (!) 148/90       No data recorded            Failed - Has GFR on file in past 12 months and most recent value is normal        Failed - Recent (12 mo) or future (90days) visit within the authorizing provider's specialty     The patient must have completed an in-person or virtual visit within the past 12 months or has a future visit scheduled within the next 90 days with the authorizing provider s specialty.  Urgent care and e-visits do not qualify as an office visit for this protocol.          Failed - Normal serum potassium on file in past 12 months     Recent Labs   Lab Test 12/04/23  1130   POTASSIUM 4.0                    Passed - Medication is active on med list and the sig matches. RN to manually verify dose and sig if red X/fail.     If the protocol passes (green check), you do not need to verify med dose and sig.    A prescription matches if they are the same clinical intention.    For Example: once daily and every morning are the same.    The protocol can not identify upper and lower case letters as matching and will fail.     For  Example: Take 1 tablet (50 mg) by mouth daily     TAKE 1 TABLET (50 MG) BY MOUTH DAILY    For all fails (red x), verify dose and sig.    If the refill does match what is on file, the RN can still proceed to approve the refill request.       If they do not match, route to the appropriate provider.             Passed - Medication indicated for associated diagnosis     The medication is prescribed for one or more of the following conditions:    Chronic Kidney Disease (CDK)    Heart Failure (HF)    Diabetes, Nephropathy   Hypertension    Coronary Artery Disease (CAD)   Raynaud's Disease   Ischemic cardiomyopathy   Cardiomyopathy   Pulmonary Hypertension          Passed - Patient is age 18 or older

## 2025-03-21 DIAGNOSIS — J31.2 CHRONIC SORE THROAT: ICD-10-CM

## 2025-03-21 DIAGNOSIS — R35.0 BENIGN PROSTATIC HYPERPLASIA WITH URINARY FREQUENCY: ICD-10-CM

## 2025-03-21 DIAGNOSIS — G43.009 MIGRAINE WITHOUT AURA AND WITHOUT STATUS MIGRAINOSUS, NOT INTRACTABLE: ICD-10-CM

## 2025-03-21 DIAGNOSIS — N40.1 BENIGN PROSTATIC HYPERPLASIA WITH URINARY FREQUENCY: ICD-10-CM

## 2025-03-21 NOTE — TELEPHONE ENCOUNTER
Tamsulosin:  Clinic RN: Please investigate patient's chart or contact patient if the information cannot be found because patient should have run out of this medication on 12/4/2024. Confirm patient is taking this medication as prescribed. Document findings and route refill encounter to provider for approval or denial.    Fluticasone:  Clinic RN: Please investigate patient's chart or contact patient if the information cannot be found because patient should have run out of this medication on 6/6/2024. Confirm patient is taking this medication as prescribed. Document findings and route refill encounter to provider for approval or denial.  Alicia Edwards RN, BSN  [facility]

## 2025-03-27 ENCOUNTER — OFFICE VISIT (OUTPATIENT)
Dept: INTERNAL MEDICINE | Facility: CLINIC | Age: 60
End: 2025-03-27
Payer: COMMERCIAL

## 2025-03-27 ENCOUNTER — LAB (OUTPATIENT)
Dept: LAB | Facility: CLINIC | Age: 60
End: 2025-03-27
Payer: COMMERCIAL

## 2025-03-27 VITALS
WEIGHT: 209.8 LBS | DIASTOLIC BLOOD PRESSURE: 95 MMHG | SYSTOLIC BLOOD PRESSURE: 165 MMHG | OXYGEN SATURATION: 97 % | HEIGHT: 69 IN | TEMPERATURE: 98 F | HEART RATE: 84 BPM | BODY MASS INDEX: 31.07 KG/M2 | RESPIRATION RATE: 18 BRPM

## 2025-03-27 DIAGNOSIS — R35.1 NOCTURIA: ICD-10-CM

## 2025-03-27 DIAGNOSIS — I10 BENIGN ESSENTIAL HYPERTENSION: ICD-10-CM

## 2025-03-27 DIAGNOSIS — Z12.5 SCREENING FOR PROSTATE CANCER: ICD-10-CM

## 2025-03-27 DIAGNOSIS — Z13.1 SCREENING FOR DIABETES MELLITUS: ICD-10-CM

## 2025-03-27 DIAGNOSIS — M25.512 CHRONIC LEFT SHOULDER PAIN: Primary | ICD-10-CM

## 2025-03-27 DIAGNOSIS — G89.29 CHRONIC LEFT SHOULDER PAIN: Primary | ICD-10-CM

## 2025-03-27 LAB
ALBUMIN SERPL BCG-MCNC: 4.6 G/DL (ref 3.5–5.2)
ALP SERPL-CCNC: 78 U/L (ref 40–150)
ALT SERPL W P-5'-P-CCNC: 15 U/L (ref 0–70)
ANION GAP SERPL CALCULATED.3IONS-SCNC: 11 MMOL/L (ref 7–15)
AST SERPL W P-5'-P-CCNC: 19 U/L (ref 0–45)
BILIRUB SERPL-MCNC: 0.5 MG/DL
BUN SERPL-MCNC: 12.2 MG/DL (ref 8–23)
CALCIUM SERPL-MCNC: 9.9 MG/DL (ref 8.8–10.4)
CHLORIDE SERPL-SCNC: 101 MMOL/L (ref 98–107)
CREAT SERPL-MCNC: 0.84 MG/DL (ref 0.67–1.17)
EGFRCR SERPLBLD CKD-EPI 2021: >90 ML/MIN/1.73M2
ERYTHROCYTE [DISTWIDTH] IN BLOOD BY AUTOMATED COUNT: 12.4 % (ref 10–15)
EST. AVERAGE GLUCOSE BLD GHB EST-MCNC: 134 MG/DL
GLUCOSE SERPL-MCNC: 134 MG/DL (ref 70–99)
HBA1C MFR BLD: 6.3 %
HCO3 SERPL-SCNC: 28 MMOL/L (ref 22–29)
HCT VFR BLD AUTO: 41.2 % (ref 40–53)
HGB BLD-MCNC: 14.2 G/DL (ref 13.3–17.7)
MCH RBC QN AUTO: 30 PG (ref 26.5–33)
MCHC RBC AUTO-ENTMCNC: 34.5 G/DL (ref 31.5–36.5)
MCV RBC AUTO: 87 FL (ref 78–100)
PLATELET # BLD AUTO: 245 10E3/UL (ref 150–450)
POTASSIUM SERPL-SCNC: 4.4 MMOL/L (ref 3.4–5.3)
PROT SERPL-MCNC: 8 G/DL (ref 6.4–8.3)
PSA SERPL DL<=0.01 NG/ML-MCNC: 0.46 NG/ML (ref 0–4.5)
RBC # BLD AUTO: 4.74 10E6/UL (ref 4.4–5.9)
SODIUM SERPL-SCNC: 140 MMOL/L (ref 135–145)
WBC # BLD AUTO: 5.9 10E3/UL (ref 4–11)

## 2025-03-27 PROCEDURE — 99000 SPECIMEN HANDLING OFFICE-LAB: CPT | Performed by: PATHOLOGY

## 2025-03-27 PROCEDURE — 99417 PROLNG OP E/M EACH 15 MIN: CPT | Performed by: NURSE PRACTITIONER

## 2025-03-27 PROCEDURE — 83036 HEMOGLOBIN GLYCOSYLATED A1C: CPT | Performed by: NURSE PRACTITIONER

## 2025-03-27 PROCEDURE — 99215 OFFICE O/P EST HI 40 MIN: CPT | Performed by: NURSE PRACTITIONER

## 2025-03-27 PROCEDURE — 3077F SYST BP >= 140 MM HG: CPT | Performed by: NURSE PRACTITIONER

## 2025-03-27 PROCEDURE — 3080F DIAST BP >= 90 MM HG: CPT | Performed by: NURSE PRACTITIONER

## 2025-03-27 RX ORDER — TAMSULOSIN HYDROCHLORIDE 0.4 MG/1
0.4 CAPSULE ORAL DAILY
Qty: 90 CAPSULE | Refills: 3 | OUTPATIENT
Start: 2025-03-27

## 2025-03-27 RX ORDER — AMLODIPINE BESYLATE 5 MG/1
5 TABLET ORAL DAILY
Qty: 90 TABLET | Refills: 3 | Status: SHIPPED | OUTPATIENT
Start: 2025-03-27

## 2025-03-27 RX ORDER — TAMSULOSIN HYDROCHLORIDE 0.4 MG/1
0.8 CAPSULE ORAL DAILY
Qty: 180 CAPSULE | Refills: 3 | Status: SHIPPED | OUTPATIENT
Start: 2025-03-27

## 2025-03-27 RX ORDER — LOSARTAN POTASSIUM 100 MG/1
100 TABLET ORAL DAILY
Qty: 90 TABLET | Refills: 3 | Status: SHIPPED | OUTPATIENT
Start: 2025-03-27

## 2025-03-27 ASSESSMENT — PATIENT HEALTH QUESTIONNAIRE - PHQ9
SUM OF ALL RESPONSES TO PHQ QUESTIONS 1-9: 16
10. IF YOU CHECKED OFF ANY PROBLEMS, HOW DIFFICULT HAVE THESE PROBLEMS MADE IT FOR YOU TO DO YOUR WORK, TAKE CARE OF THINGS AT HOME, OR GET ALONG WITH OTHER PEOPLE: SOMEWHAT DIFFICULT
SUM OF ALL RESPONSES TO PHQ QUESTIONS 1-9: 16

## 2025-03-27 NOTE — PROGRESS NOTES
Preventive Care Visit  [facility]  JULIETH Rodriguez CNP, Internal Medicine  Mar 27, 2025  {Provider  Link to SmartSet :438853}    {PROVIDER CHARTING PREFERENCE:016151}    Emanuel Senior is a 59 year old, presenting for the following:  Physical (Worsening left shoulder pain.)        3/27/2025     9:43 AM   Additional Questions   Roomed by KTR          HPI  ***   {MA/LPN/RN Pre-Provider Visit Orders- hCG/UA/Strep (Optional):908481}  {SUPERLIST (Optional):487703}  {additonal problems for provider to add (Optional):823119}  Advance Care Planning  Patient does not have a Health Care Directive: {ADVANCE_DIRECTIVE_STATUS:788523}       No data to display                   No data to display                   No data to display                  12/4/2023   Social Factors   Worry food won't last until get money to buy more No   Food not last or not have enough money for food? No   Do you have housing? (Housing is defined as stable permanent housing and does not include staying ouside in a car, in a tent, in an abandoned building, in an overnight shelter, or couch-surfing.) Yes   Are you worried about losing your housing? No   Lack of transportation? No   Unable to get utilities (heat,electricity)? No          No data to display                  Today's PHQ-9 Score:       3/27/2025     9:25 AM   PHQ-9 SCORE   PHQ-9 Total Score MyChart 16 (Moderately severe depression)   PHQ-9 Total Score 16        Patient-reported          No data to display              Social History     Tobacco Use    Smoking status: Never    Smokeless tobacco: Never   Substance Use Topics    Alcohol use: No    Drug use: No     {Provider  If there are gaps in the social history shown above, please follow the link to update and then refresh the note Link to Social and Substance History :563164}  Last PSA:   PSA   Date Value Ref Range Status   02/01/2021 0.41 0 - 4 ug/L Final     Comment:     Assay  "Method:  Chemiluminescence using Siemens Vista analyzer     Prostate Specific Antigen Screen   Date Value Ref Range Status   12/04/2023 0.40 0.00 - 3.50 ng/mL Final     ASCVD Risk   The 10-year ASCVD risk score (Chon MEDINA, et al., 2019) is: 17.7%    Values used to calculate the score:      Age: 59 years      Sex: Male      Is Non- : Yes      Diabetic: No      Tobacco smoker: No      Systolic Blood Pressure: 150 mmHg      Is BP treated: Yes      HDL Cholesterol: 49 mg/dL      Total Cholesterol: 196 mg/dL    {Link to Fracture Risk Assessment Tool (Optional):538247}    {Provider  REQUIRED FOR AWV Use the storyboard to review patient history, after sections have been marked as reviewed, refresh note to capture documentation:878797}   Reviewed and updated as needed this visit by Provider                    {HISTORY OPTIONS (Optional):422418}    {ROS Picklists (Optional):423936}     Objective    Exam  BP (!) 150/94 (BP Location: Right arm, Patient Position: Sitting, Cuff Size: Adult Regular)   Pulse 84   Temp 98  F (36.7  C) (Oral)   Resp 18   Ht 1.753 m (5' 9\")   Wt 95.2 kg (209 lb 12.8 oz)   SpO2 97%   BMI 30.98 kg/m     Estimated body mass index is 30.98 kg/m  as calculated from the following:    Height as of this encounter: 1.753 m (5' 9\").    Weight as of this encounter: 95.2 kg (209 lb 12.8 oz).    Physical Exam  {Exam Choices (Optional):846593}        Signed Electronically by: JULIETH Rodriguez CNP  {Email feedback regarding this note to [email or web address]   :052083}  " "Francisco Grigsby MD;  Location:  OR     Past Medical History:   Diagnosis Date    Adenomatous polyp of colon 10/31/2023    Repeat colonoscopy in 2025    Chronic pain syndrome     Followed by Gokul Negrete MD at a Pain Clinic in [place].    Depression     Episodic tension-type headache, not intractable     Hypertension     Ulcerative colitis (H) 2003 12/4/2023   Social Factors   Worry food won't last until get money to buy more No   Food not last or not have enough money for food? No   Do you have housing? (Housing is defined as stable permanent housing and does not include staying ouside in a car, in a tent, in an abandoned building, in an overnight shelter, or couch-surfing.) Yes   Are you worried about losing your housing? No   Lack of transportation? No   Unable to get utilities (heat,electricity)? No       Today's PHQ-9 Score:         Social History     Tobacco Use    Smoking status: Never    Smokeless tobacco: Never   Substance Use Topics    Alcohol use: No    Drug use: No       Last PSA:   PSA   Date Value Ref Range Status   02/01/2021 0.41 0 - 4 ug/L Final     Comment:     Assay Method:  Chemiluminescence using Siemens Vista analyzer     Prostate Specific Antigen Screen   Date Value Ref Range Status   12/04/2023 0.40 0.00 - 3.50 ng/mL Final     ASCVD Risk   The 10-year ASCVD risk score (Chon MEDINA, et al., 2019) is: 17.7%    Values used to calculate the score:      Age: 59 years      Sex: Male      Is Non- : Yes      Diabetic: No      Tobacco smoker: No      Systolic Blood Pressure: 150 mmHg      Is BP treated: Yes      HDL Cholesterol: 49 mg/dL      Total Cholesterol: 196 mg/dL           Reviewed and updated as needed this visit by Provider                       Objective    Exam  BP (!) 150/94 (BP Location: Right arm, Patient Position: Sitting, Cuff Size: Adult Regular)   Pulse 84   Temp 98  F (36.7  C) (Oral)   Resp 18   Ht 1.753 m (5' 9\")   Wt 95.2 kg (209 " "lb 12.8 oz)   SpO2 97%   BMI 30.98 kg/m     Estimated body mass index is 30.98 kg/m  as calculated from the following:    Height as of this encounter: 1.753 m (5' 9\").    Weight as of this encounter: 95.2 kg (209 lb 12.8 oz).    Physical Exam  GENERAL: alert and no distress  EYES: Eyes grossly normal to inspection, PERRL and conjunctivae and sclerae normal  HENT: ear canals and TM's normal, mouth without ulcers or lesions  RESP: lungs clear to auscultation - no rales, rhonchi or wheezes  CV: regular rate and rhythm, normal S1 S2, no S3 or S4, no murmur, click or rub, no peripheral edema.   3/27/2025  9:47 AM 3/27/2025  9:56 AM 3/27/2025  10:14 AM   Vital Signs      Systolic 167 !  150 !  165 !    Diastolic 102 (H)  94 (H)  95 (H)       ABDOMEN: soft, nontender, no hepatosplenomegaly, no masses and bowel sounds normal  MS: no gross musculoskeletal defects noted, no edema  SKIN: no suspi    Legend:  ! Abnormal  (H) Highcious lesions or rashes  NEURO:  mentation intact and speech normal  PSYCH: mentation appears normal and affect normal  LYMPH: no cervical, supraclavicular, axillary  adenopathy    Shoulder MRI: see result of MRI  Neck MRI: see result of MRI    Signed Electronically by: JULIETH Rodriguez CNP    "

## 2025-03-30 RX ORDER — PSEUDOEPHED/ACETAMINOPH/DIPHEN 30MG-500MG
TABLET ORAL
Qty: 120 TABLET | Refills: 5 | Status: SHIPPED | OUTPATIENT
Start: 2025-03-30

## 2025-03-30 RX ORDER — FLUTICASONE PROPIONATE 50 MCG
SPRAY, SUSPENSION (ML) NASAL
Qty: 16 G | Refills: 3 | Status: SHIPPED | OUTPATIENT
Start: 2025-03-30

## 2025-04-01 DIAGNOSIS — G43.009 MIGRAINE WITHOUT AURA AND WITHOUT STATUS MIGRAINOSUS, NOT INTRACTABLE: ICD-10-CM

## 2025-04-01 RX ORDER — PSEUDOEPHED/ACETAMINOPH/DIPHEN 30MG-500MG
TABLET ORAL
Qty: 120 TABLET | Refills: 5 | OUTPATIENT
Start: 2025-04-01

## 2025-04-01 NOTE — TELEPHONE ENCOUNTER
Action 4.1.25 jm   Action Taken Faxed imaging request to [facility].    4.3.25 jm  Imaging received and resolved to [facility].       DIAGNOSIS: Chronic left shoulder pain/Overkamp, JULIETH Ervin CNP/HP/MRI     APPOINTMENT DATE: 4.7.25   NOTES STATUS DETAILS   OFFICE NOTE from referring provider Internal 3.27.25  Overkamp  IM   MEDICATION LIST Internal    MRI Pacs 3.12.25  MR Cervical Spine  MR Shoulder Left

## 2025-04-07 ENCOUNTER — PRE VISIT (OUTPATIENT)
Dept: ORTHOPEDICS | Facility: CLINIC | Age: 60
End: 2025-04-07

## 2025-04-09 NOTE — PROGRESS NOTES
ASSESSMENT/PLAN:  (M75.112) Partial nontraumatic tear of rotator cuff, left  (primary encounter diagnosis)  Comment: ***  Plan: predniSONE (DELTASONE) 20 MG tablet, Physical         Therapy  Referral, Miscellaneous DME Order          (M50.10) Cervical disc disorder with radiculopathy  Comment: ***  Plan: predniSONE (DELTASONE) 20 MG tablet, Physical         Therapy  Referral          Attestation:  This patient has been seen and evaluated by me, Chris Sims MD with the resident, Dr Landeros and the care team. I agree with the findings and plan of care as documented in this note.            Chris Sims MD  April 10, 2025  2:40 PM        Pt is a 59 year old male here today for:   HPI:   Left Shoulder pain:   Coping for months, grinding pain 8 months ago gradual worsening, last 2 months keeping him up  Also has ulcerative colitis, needs to lay on left side due to anal medication  Location: Posterior. Anterior   Duration: 6-8 months; worsening in the last 2 months    Injury? Inciting activity? No    Radiation: Yes down medial arm into the left 4th and 5th digits and the radial side of midle finger  Numbness/tingling? Yes    Weakness? Yes    Instability? No    Clicking/ catching? Yes    Imaging? MRI on 3/12/25    Treatment? Icing, stretches, and percocet    Pain sleeping on the left shoulder at night       Per Milka Overkamp, CNP's note:  Parrish Muir is a 59 year old male who presents from [place] to have an annual exam and his PMD Dr Lafleur was not available.       His main concerns are worsening left shoulder pain.  He has had 2 MRIs in the past 3 weeks. He has the written reports and actual disc with him. He is asking for a referral to a shoulder surgeon. He is left handed and cannot lift a gallon of milk with his left hand. He cannot sleep the past 2 months. He cannot lie on his left side due to pain . He is currently taking Gabapentin 300 mg during the day and 600 mg at bedtime. Asking for  assistance with sleep.     He is also having significant cervical beck pain and also has those results with him.     History of Present Illness         Reason for visit:  HBP monitor med, annual LFT test, recent MRI  shoulder  Symptom onset:  More than a month  Symptoms include:  Loss of sleep because of shoulder pain, & motion use of left arm .  Symptom intensity:  Severe  Symptom progression:  Staying the same  Had these symptoms before:  No  What makes it worse:  Laying on left shoulder, lift gal of milk  What makes it better:  Icing my shoulder He is missing 2 dose(s) of medications per week.  He is not taking prescribed medications regularly due to remembering to take.    Past Medical History:   Diagnosis Date    Adenomatous polyp of colon 10/31/2023    Repeat colonoscopy in 2025    Chronic pain syndrome     Followed by Gokul Negrete MD at a Pain Clinic in [place].    Depression     Episodic tension-type headache, not intractable     Hypertension     Ulcerative colitis (H) 2003      Past Surgical History:   Procedure Laterality Date    COLONOSCOPY N/A 10/26/2023    Adenomatous polyp, repeat in 2025; Procedure: COLONOSCOPY, WITH POLYPECTOMY AND BIOPSY;  Surgeon: Francisco Moses MD;  Location: [facility]    COLONOSCOPY WITH CO2 INSUFFLATION N/A 11/06/2015    Procedure: COLONOSCOPY WITH CO2 INSUFFLATION;  Surgeon: Francisco Grigsby MD;  Location: [facility] OR      Current Outpatient Medications   Medication Sig Dispense Refill    acetaminophen (TYLENOL) 500 MG tablet TAKE 1 TO 2 TABLETS BY MOUTH EVERY 6 HOURS AS NEEDED FOR MILD PAIN 120 tablet 5    amLODIPine (NORVASC) 5 MG tablet Take 1 tablet (5 mg) by mouth daily. 90 tablet 3    baclofen (LIORESAL) 10 MG tablet       benzoyl peroxide 10 % topical liquid Use daily as directed when showering and be careful about bleaching of towels 227 g 3    Camphor-Menthol-Methyl Sal 3.1-6-10 % PTCH Apply 1 patch topically      fluticasone (FLONASE) 50 MCG/ACT nasal spray INSTILL 1 SPRAY INTO  EACH NOSTRIL ONCE DAILY 16 g 3    gabapentin (NEURONTIN) 300 MG capsule Take 1 capsule (300 mg) by mouth At Bedtime 30 capsule 1    losartan (COZAAR) 100 MG tablet Take 1 tablet (100 mg) by mouth daily. 90 tablet 3    mesalamine (LIALDA) 1.2 g DR tablet Take 3 tablets (3,600 mg) by mouth daily (with breakfast). 270 tablet 2    Mesalamine-Cleanser (ROWASA) 4 g KIT Place 4 g rectally every evening. 30 kit 11    methylcellulose (CITRUCEL) powder Take 0.55 g (1.925 teaspoonful) by mouth daily 454 g 3    order for DME Equipment being ordered: Digital home blood pressure monitor kit 1 Units 0    oxyCODONE (OXYCONTIN) 10 MG 12 hr tablet Take 10 mg by mouth every 12 hours      oxyCODONE-acetaminophen (PERCOCET) 5-325 MG per tablet Take 1 tablet by mouth every 6 hours as needed for moderate to severe pain 120 tablet 0    polyethylene glycol (MIRALAX) 17 GM/Dose powder Take 17 g by mouth 2 times daily For a goal of 1-2 soft bowel movements daily. Take an additional 17g if you are not achieving this. 510 g 3    predniSONE (DELTASONE) 20 MG tablet Take 3 tablets (60 mg) by mouth daily for 5 days, THEN 2 tablets (40 mg) daily for 5 days, THEN 1 tablet (20 mg) daily for 5 days, THEN 0.5 tablets (10 mg) daily for 6 days. 33 tablet 0    tamsulosin (FLOMAX) 0.4 MG capsule Take 2 capsules (0.8 mg) by mouth daily. 180 capsule 3      Allergies   Allergen Reactions    Morphine Sulfate      Severe hallucinations    Contrast Dye      PN: LW CM1: CONTRAST- nka Reaction :    Food      PN: LW FI1: nka    Balsalazide Other (See Comments)     Severe constipation    Severe Constipation    Hydrocodone-Acetaminophen Itching    Iodinated Contrast Media Other (See Comments)     PN: LW CM1: CONTRAST- nka Reaction :    Other Environmental Allergy Other (See Comments)     ALLERGY TESTING -      PN: LW other1 - nka.      PN: LW Fl1 - nka.     PN: LW CM1: Contrast - nka   No idea where original note is. Initial allergy listed 02/09/1999.     Sulfasalazine Other (See Comments)     Severe abdominal pain    Severe Abdominal Pain      ROS:   Gen- no fevers/chills   Rheum - no morning stiffness   Derm - no rash/ redness   Neuro - see HPI  Remainder of ROS negative.     Exam:   There were no vitals taken for this visit.     Left Shoulder:   Inspection: asymmetry? no; dyskinesis? no   ROM:   Active - forward flexion - 90/ abduction -90/ int rot - symmetric/ ext rot - symmetric   Passive- forward flexion - 90/ abduction - 90  Strength: deltoid/supraspinatous - 5/5; infraspinatous/ teres minor - 5/5; subscapularis - 5/5   Maneuvers:   RTC - empty can - positive ; painful arc -positive; lift off - positive  Impingement - Haynes -positive; Neer- positive   AC - cross arm - positive   Biceps - Speed's - neg; Yergason's - deferred  Labrum - Patino's - deferred; Esparza shear - deferred  Instability - Apprehension - neg   Palpation: AC - neg; Acromion/ supraspinatus - positive; scapula - positive; bicipital groove - positive      DME (Durable Medical Equipment) Orders and Documentation  Orders Placed This Encounter   Procedures    Miscellaneous DME Order        The patient was assessed and it was determined the patient is in need of the following listed DME Supplies/Equipment. Please complete supporting documentation below to demonstrate medical necessity.

## 2025-04-10 ENCOUNTER — OFFICE VISIT (OUTPATIENT)
Dept: ORTHOPEDICS | Facility: CLINIC | Age: 60
End: 2025-04-10
Attending: NURSE PRACTITIONER
Payer: COMMERCIAL

## 2025-04-10 DIAGNOSIS — M50.10 CERVICAL DISC DISORDER WITH RADICULOPATHY: ICD-10-CM

## 2025-04-10 DIAGNOSIS — M75.112 PARTIAL NONTRAUMATIC TEAR OF ROTATOR CUFF, LEFT: Primary | ICD-10-CM

## 2025-04-10 RX ORDER — PREDNISONE 20 MG/1
TABLET ORAL
Qty: 33 TABLET | Refills: 0 | Status: SHIPPED | OUTPATIENT
Start: 2025-04-10 | End: 2025-05-01

## 2025-04-10 NOTE — PATIENT INSTRUCTIONS
[facility] Outpatient Physical Therapy Locations    To schedule an appointment please call our scheduling department at [phone]    To fax a referral to be scheduled fax to [phone]    [place]: [address], [facility]: [address] [facility]: [address], [facility]: [address], [facility]: [address], [facility]: [address] [facility]: [address] [place], [place]  [place]: [address] [facility] , [address], [facility]: [address] [facility] , [address], [facility]: [address] [facility]: [address], [facility]: [address], [facility]: [address], [facility]: [address] [facility]: [address], [facility]: [facility]: [address] [facility], [address], [facility]: [address], [facility]: [address], [place], [place]  [place]: [address] [facility]: [address] [place] [address]: [address], [facility]: [address] [facility] [address], [place]  [place]: [address], [facility]: [address], [facility]: [address] [facility] [place], [place]  [facility]: [address], [place]  [facility]: [address] [place] [address]  [place]: [address] [facility] [address] [facility], [address], [facility] [address] [facility],  [facility]: [address] [facility] [place], [place]

## 2025-05-27 ENCOUNTER — TELEPHONE (OUTPATIENT)
Dept: ORTHOPEDICS | Facility: CLINIC | Age: 60
End: 2025-05-27

## 2025-05-27 NOTE — TELEPHONE ENCOUNTER
Left Voicemail (1st Attempt) and Sent Mychart (1st Attempt) for the patient to call back and schedule the following:    Appointment type: Return Shoulder  Provider:   Return date: Next Available  Specialty phone number: [phone]